# Patient Record
Sex: MALE | Race: OTHER | URBAN - METROPOLITAN AREA
[De-identification: names, ages, dates, MRNs, and addresses within clinical notes are randomized per-mention and may not be internally consistent; named-entity substitution may affect disease eponyms.]

---

## 2024-08-29 ENCOUNTER — EMERGENCY (EMERGENCY)
Facility: HOSPITAL | Age: 54
LOS: 1 days | Discharge: HOME | End: 2024-08-29
Attending: STUDENT IN AN ORGANIZED HEALTH CARE EDUCATION/TRAINING PROGRAM
Payer: COMMERCIAL

## 2024-08-29 VITALS
WEIGHT: 124.56 LBS | DIASTOLIC BLOOD PRESSURE: 84 MMHG | HEIGHT: 64.17 IN | RESPIRATION RATE: 20 BRPM | OXYGEN SATURATION: 97 % | TEMPERATURE: 99 F | SYSTOLIC BLOOD PRESSURE: 157 MMHG | HEART RATE: 78 BPM

## 2024-08-29 VITALS
RESPIRATION RATE: 18 BRPM | DIASTOLIC BLOOD PRESSURE: 76 MMHG | TEMPERATURE: 98 F | SYSTOLIC BLOOD PRESSURE: 142 MMHG | OXYGEN SATURATION: 99 % | HEART RATE: 72 BPM

## 2024-08-29 LAB
ALBUMIN SERPL ELPH-MCNC: 2.8 G/DL — LOW (ref 3.5–5)
ALP SERPL-CCNC: 141 U/L — HIGH (ref 40–120)
ALT FLD-CCNC: 20 U/L DA — SIGNIFICANT CHANGE UP (ref 10–60)
ANION GAP SERPL CALC-SCNC: 5 MMOL/L — SIGNIFICANT CHANGE UP (ref 5–17)
AST SERPL-CCNC: 24 U/L — SIGNIFICANT CHANGE UP (ref 10–40)
BASOPHILS # BLD AUTO: 0.03 K/UL — SIGNIFICANT CHANGE UP (ref 0–0.2)
BASOPHILS NFR BLD AUTO: 0.5 % — SIGNIFICANT CHANGE UP (ref 0–2)
BILIRUB SERPL-MCNC: 0.7 MG/DL — SIGNIFICANT CHANGE UP (ref 0.2–1.2)
BUN SERPL-MCNC: 7 MG/DL — SIGNIFICANT CHANGE UP (ref 7–18)
CALCIUM SERPL-MCNC: 9.1 MG/DL — SIGNIFICANT CHANGE UP (ref 8.4–10.5)
CHLORIDE SERPL-SCNC: 109 MMOL/L — HIGH (ref 96–108)
CO2 SERPL-SCNC: 27 MMOL/L — SIGNIFICANT CHANGE UP (ref 22–31)
CREAT SERPL-MCNC: 0.61 MG/DL — SIGNIFICANT CHANGE UP (ref 0.5–1.3)
EGFR: 115 ML/MIN/1.73M2 — SIGNIFICANT CHANGE UP
EGFR: 115 ML/MIN/1.73M2 — SIGNIFICANT CHANGE UP
EOSINOPHIL # BLD AUTO: 0.15 K/UL — SIGNIFICANT CHANGE UP (ref 0–0.5)
EOSINOPHIL NFR BLD AUTO: 2.5 % — SIGNIFICANT CHANGE UP (ref 0–6)
GLUCOSE SERPL-MCNC: 130 MG/DL — HIGH (ref 70–99)
HCT VFR BLD CALC: 45.4 % — SIGNIFICANT CHANGE UP (ref 39–50)
HGB BLD-MCNC: 15 G/DL — SIGNIFICANT CHANGE UP (ref 13–17)
IMM GRANULOCYTES NFR BLD AUTO: 0.2 % — SIGNIFICANT CHANGE UP (ref 0–0.9)
LIDOCAIN IGE QN: 41 U/L — SIGNIFICANT CHANGE UP (ref 13–75)
LYMPHOCYTES # BLD AUTO: 2.31 K/UL — SIGNIFICANT CHANGE UP (ref 1–3.3)
LYMPHOCYTES # BLD AUTO: 37.8 % — SIGNIFICANT CHANGE UP (ref 13–44)
MCHC RBC-ENTMCNC: 29.1 PG — SIGNIFICANT CHANGE UP (ref 27–34)
MCHC RBC-ENTMCNC: 33 GM/DL — SIGNIFICANT CHANGE UP (ref 32–36)
MCV RBC AUTO: 88.2 FL — SIGNIFICANT CHANGE UP (ref 80–100)
MONOCYTES # BLD AUTO: 1.14 K/UL — HIGH (ref 0–0.9)
MONOCYTES NFR BLD AUTO: 18.7 % — HIGH (ref 2–14)
NEUTROPHILS # BLD AUTO: 2.47 K/UL — SIGNIFICANT CHANGE UP (ref 1.8–7.4)
NEUTROPHILS NFR BLD AUTO: 40.3 % — LOW (ref 43–77)
NRBC # BLD: 0 /100 WBCS — SIGNIFICANT CHANGE UP (ref 0–0)
NRBC BLD-RTO: 0 /100 WBCS — SIGNIFICANT CHANGE UP (ref 0–0)
PLATELET # BLD AUTO: 73 K/UL — LOW (ref 150–400)
POTASSIUM SERPL-MCNC: 4.2 MMOL/L — SIGNIFICANT CHANGE UP (ref 3.5–5.3)
POTASSIUM SERPL-SCNC: 4.2 MMOL/L — SIGNIFICANT CHANGE UP (ref 3.5–5.3)
PROT SERPL-MCNC: 6 G/DL — SIGNIFICANT CHANGE UP (ref 6–8.3)
RBC # BLD: 5.15 M/UL — SIGNIFICANT CHANGE UP (ref 4.2–5.8)
RBC # FLD: 19.9 % — HIGH (ref 10.3–14.5)
SODIUM SERPL-SCNC: 141 MMOL/L — SIGNIFICANT CHANGE UP (ref 135–145)
WBC # BLD: 6.11 K/UL — SIGNIFICANT CHANGE UP (ref 3.8–10.5)
WBC # FLD AUTO: 6.11 K/UL — SIGNIFICANT CHANGE UP (ref 3.8–10.5)

## 2024-08-29 PROCEDURE — 99285 EMERGENCY DEPT VISIT HI MDM: CPT

## 2024-08-29 PROCEDURE — 83690 ASSAY OF LIPASE: CPT

## 2024-08-29 PROCEDURE — 74177 CT ABD & PELVIS W/CONTRAST: CPT | Mod: 26,MC

## 2024-08-29 PROCEDURE — 74177 CT ABD & PELVIS W/CONTRAST: CPT | Mod: MC

## 2024-08-29 PROCEDURE — 85025 COMPLETE CBC W/AUTO DIFF WBC: CPT

## 2024-08-29 PROCEDURE — 80053 COMPREHEN METABOLIC PANEL: CPT

## 2024-08-29 PROCEDURE — 99284 EMERGENCY DEPT VISIT MOD MDM: CPT | Mod: 25

## 2024-08-29 PROCEDURE — 36415 COLL VENOUS BLD VENIPUNCTURE: CPT

## 2024-08-29 RX ORDER — SULFAMETHOXAZOLE/TRIMETHOPRIM 800-160 MG
10 TABLET ORAL
Qty: 200 | Refills: 0
Start: 2024-08-29 | End: 2024-09-07

## 2024-08-29 RX ORDER — SULFAMETHOXAZOLE/TRIMETHOPRIM 800-160 MG
20 TABLET ORAL
Qty: 400 | Refills: 0
Start: 2024-08-29 | End: 2024-09-07

## 2024-08-29 RX ORDER — SULFAMETHOXAZOLE/TRIMETHOPRIM 800-160 MG
80 TABLET ORAL ONCE
Refills: 0 | Status: DISCONTINUED | OUTPATIENT
Start: 2024-08-29 | End: 2024-08-29

## 2024-08-29 RX ORDER — SULFAMETHOXAZOLE/TRIMETHOPRIM 800-160 MG
160 TABLET ORAL ONCE
Refills: 0 | Status: COMPLETED | OUTPATIENT
Start: 2024-08-29 | End: 2024-08-29

## 2024-08-29 RX ADMIN — Medication 160 MILLIGRAM(S): at 14:53

## 2024-09-03 ENCOUNTER — EMERGENCY (EMERGENCY)
Facility: HOSPITAL | Age: 54
LOS: 1 days | Discharge: HOME | End: 2024-09-03
Attending: STUDENT IN AN ORGANIZED HEALTH CARE EDUCATION/TRAINING PROGRAM
Payer: COMMERCIAL

## 2024-09-03 VITALS
OXYGEN SATURATION: 96 % | WEIGHT: 123.9 LBS | SYSTOLIC BLOOD PRESSURE: 143 MMHG | TEMPERATURE: 98 F | HEIGHT: 64.17 IN | DIASTOLIC BLOOD PRESSURE: 90 MMHG | HEART RATE: 100 BPM | RESPIRATION RATE: 17 BRPM

## 2024-09-03 VITALS
OXYGEN SATURATION: 96 % | RESPIRATION RATE: 18 BRPM | TEMPERATURE: 98 F | SYSTOLIC BLOOD PRESSURE: 149 MMHG | DIASTOLIC BLOOD PRESSURE: 81 MMHG | HEART RATE: 84 BPM

## 2024-09-03 LAB
ALBUMIN SERPL ELPH-MCNC: 3.1 G/DL — LOW (ref 3.5–5)
ALP SERPL-CCNC: 151 U/L — HIGH (ref 40–120)
ALT FLD-CCNC: 19 U/L DA — SIGNIFICANT CHANGE UP (ref 10–60)
ANION GAP SERPL CALC-SCNC: 5 MMOL/L — SIGNIFICANT CHANGE UP (ref 5–17)
APTT BLD: 33 SEC — SIGNIFICANT CHANGE UP (ref 24.5–35.6)
AST SERPL-CCNC: 18 U/L — SIGNIFICANT CHANGE UP (ref 10–40)
BASOPHILS # BLD AUTO: 0 K/UL — SIGNIFICANT CHANGE UP (ref 0–0.2)
BASOPHILS NFR BLD AUTO: 0 % — SIGNIFICANT CHANGE UP (ref 0–2)
BILIRUB SERPL-MCNC: 0.3 MG/DL — SIGNIFICANT CHANGE UP (ref 0.2–1.2)
BLD GP AB SCN SERPL QL: SIGNIFICANT CHANGE UP
BUN SERPL-MCNC: 11 MG/DL — SIGNIFICANT CHANGE UP (ref 7–18)
CALCIUM SERPL-MCNC: 9.5 MG/DL — SIGNIFICANT CHANGE UP (ref 8.4–10.5)
CHLORIDE SERPL-SCNC: 111 MMOL/L — HIGH (ref 96–108)
CO2 SERPL-SCNC: 27 MMOL/L — SIGNIFICANT CHANGE UP (ref 22–31)
CREAT SERPL-MCNC: 0.82 MG/DL — SIGNIFICANT CHANGE UP (ref 0.5–1.3)
EGFR: 105 ML/MIN/1.73M2 — SIGNIFICANT CHANGE UP
EOSINOPHIL # BLD AUTO: 0.15 K/UL — SIGNIFICANT CHANGE UP (ref 0–0.5)
EOSINOPHIL NFR BLD AUTO: 2 % — SIGNIFICANT CHANGE UP (ref 0–6)
GLUCOSE SERPL-MCNC: 136 MG/DL — HIGH (ref 70–99)
HCT VFR BLD CALC: 49 % — SIGNIFICANT CHANGE UP (ref 39–50)
HGB BLD-MCNC: 15.9 G/DL — SIGNIFICANT CHANGE UP (ref 13–17)
INR BLD: 1.06 RATIO — SIGNIFICANT CHANGE UP (ref 0.85–1.18)
LACTATE SERPL-SCNC: 1.1 MMOL/L — SIGNIFICANT CHANGE UP (ref 0.7–2)
LACTATE SERPL-SCNC: 2.9 MMOL/L — HIGH (ref 0.7–2)
LIDOCAIN IGE QN: 52 U/L — SIGNIFICANT CHANGE UP (ref 13–75)
LYMPHOCYTES # BLD AUTO: 2.91 K/UL — SIGNIFICANT CHANGE UP (ref 1–3.3)
LYMPHOCYTES # BLD AUTO: 40 % — SIGNIFICANT CHANGE UP (ref 13–44)
MANUAL SMEAR VERIFICATION: SIGNIFICANT CHANGE UP
MCHC RBC-ENTMCNC: 29.1 PG — SIGNIFICANT CHANGE UP (ref 27–34)
MCHC RBC-ENTMCNC: 32.4 GM/DL — SIGNIFICANT CHANGE UP (ref 32–36)
MCV RBC AUTO: 89.6 FL — SIGNIFICANT CHANGE UP (ref 80–100)
MONOCYTES # BLD AUTO: 1.45 K/UL — HIGH (ref 0–0.9)
MONOCYTES NFR BLD AUTO: 20 % — HIGH (ref 2–14)
NEUTROPHILS # BLD AUTO: 1.89 K/UL — SIGNIFICANT CHANGE UP (ref 1.8–7.4)
NEUTROPHILS NFR BLD AUTO: 26 % — LOW (ref 43–77)
NRBC # BLD: 0 /100 WBCS — SIGNIFICANT CHANGE UP (ref 0–0)
PLAT MORPH BLD: NORMAL — SIGNIFICANT CHANGE UP
PLATELET # BLD AUTO: 98 K/UL — LOW (ref 150–400)
PLATELET COUNT - ESTIMATE: ABNORMAL
POTASSIUM SERPL-MCNC: 5 MMOL/L — SIGNIFICANT CHANGE UP (ref 3.5–5.3)
POTASSIUM SERPL-SCNC: 5 MMOL/L — SIGNIFICANT CHANGE UP (ref 3.5–5.3)
PROT SERPL-MCNC: 6.7 G/DL — SIGNIFICANT CHANGE UP (ref 6–8.3)
PROTHROM AB SERPL-ACNC: 12.1 SEC — SIGNIFICANT CHANGE UP (ref 9.5–13)
RBC # BLD: 5.47 M/UL — SIGNIFICANT CHANGE UP (ref 4.2–5.8)
RBC # FLD: 18.6 % — HIGH (ref 10.3–14.5)
RBC BLD AUTO: NORMAL — SIGNIFICANT CHANGE UP
SODIUM SERPL-SCNC: 143 MMOL/L — SIGNIFICANT CHANGE UP (ref 135–145)
VARIANT LYMPHS # BLD: 12 % — HIGH (ref 0–6)
WBC # BLD: 7.27 K/UL — SIGNIFICANT CHANGE UP (ref 3.8–10.5)
WBC # FLD AUTO: 7.27 K/UL — SIGNIFICANT CHANGE UP (ref 3.8–10.5)

## 2024-09-03 PROCEDURE — 85730 THROMBOPLASTIN TIME PARTIAL: CPT

## 2024-09-03 PROCEDURE — 86901 BLOOD TYPING SEROLOGIC RH(D): CPT

## 2024-09-03 PROCEDURE — 83605 ASSAY OF LACTIC ACID: CPT

## 2024-09-03 PROCEDURE — 86850 RBC ANTIBODY SCREEN: CPT

## 2024-09-03 PROCEDURE — 83690 ASSAY OF LIPASE: CPT

## 2024-09-03 PROCEDURE — 99285 EMERGENCY DEPT VISIT HI MDM: CPT

## 2024-09-03 PROCEDURE — 36415 COLL VENOUS BLD VENIPUNCTURE: CPT

## 2024-09-03 PROCEDURE — 85025 COMPLETE CBC W/AUTO DIFF WBC: CPT

## 2024-09-03 PROCEDURE — 49465 FLUORO EXAM OF G/COLON TUBE: CPT

## 2024-09-03 PROCEDURE — 99284 EMERGENCY DEPT VISIT MOD MDM: CPT | Mod: 25

## 2024-09-03 PROCEDURE — 80053 COMPREHEN METABOLIC PANEL: CPT

## 2024-09-03 PROCEDURE — 85610 PROTHROMBIN TIME: CPT

## 2024-09-03 PROCEDURE — 86900 BLOOD TYPING SEROLOGIC ABO: CPT

## 2024-09-03 RX ORDER — DIATRIZOATE MEGLUMINE, SODIUM 66 %-10 %
50 VIAL (ML) INJECTION ONCE
Refills: 0 | Status: ACTIVE | OUTPATIENT
Start: 2024-09-03

## 2024-09-03 RX ORDER — SODIUM CHLORIDE 9 MG/ML
2000 INJECTION INTRAMUSCULAR; INTRAVENOUS; SUBCUTANEOUS ONCE
Refills: 0 | Status: COMPLETED | OUTPATIENT
Start: 2024-09-03 | End: 2024-09-03

## 2024-09-03 RX ADMIN — SODIUM CHLORIDE 2000 MILLILITER(S): 9 INJECTION INTRAMUSCULAR; INTRAVENOUS; SUBCUTANEOUS at 19:46

## 2024-09-03 NOTE — ED PROVIDER NOTE - PROGRESS NOTE DETAILS
JK - Labs within normal limits, lactic acidosis improving with IV fluids.  J-tube replaced by surgery.  Vital signs stable resting comfortably belly soft no peritoneal signs.  Requesting go home.  Patient states he has oncology appointment tomorrow.  Ready for DC.

## 2024-09-03 NOTE — ED PROVIDER NOTE - CLINICAL SUMMARY MEDICAL DECISION MAKING FREE TEXT BOX
Patient is a 54-year-old male with past medical history of metastatic gastric cancer s/p aborted distal gastrectomy,  jejunostomy tube placement on 4/19/2024 presenting after J-tube fell out.  VSS belly soft no peritoneal signs.  -   Check labs, rule out electrolyte abnormalities, lactate to evaluate for end-organ dysfunction, consult surgery and likely IR for jejunostomy tube replacement, reassess.

## 2024-09-03 NOTE — CONSULT NOTE ADULT - SUBJECTIVE AND OBJECTIVE BOX
Surgery Consult Note     54 M with metastatic gastric cancer s/p  aborted distal gastrectomy on 4/19 due to new found metastasis and jejunostomy tube placement. Patient seen in the ED, and surg-onc consulted for dislodged jejunostomy tube . Denies any complaints and tolerating Oral feeds well with normal BM this morning and continuing to pass flatus. Upon examination, his prior placed stitch has been dislodged, and the balloon is seen outside of the abdominal cavity and not fully inflated. Patient endorses having put as much of the J tube back in after he noticed that the balloon had come out. Patient reassured and plan for J tube study explained.      Physical Exam   Gen: AAOx4, NAD, mentating normally  CV: RRR, normal S1/S2  Pulm: Breathing comfortably on RA. Equal chest rise b/l. Lung fields CTAB  Abd: Soft, non-tender, non-distended. No rebound or guarding. No peritoneal signs, J tube bumper without suture to keep it in place and the balloon for J tube is outside of the abdominal cavity. There is no drainage from the tube insertion site and there is some reactive erythema but no overt signs of infection.  Back/flank: No CVA tenderness  Extremities: WWP. Moving all 4 extremities spontaneously. Strength 5/5. Sensation intact  Skin: No rashes or suspicious lesions     Vital Signs Last 24 Hrs  T(C): 36.8 (03 Sep 2024 15:39), Max: 36.8 (03 Sep 2024 15:39)  T(F): 98.2 (03 Sep 2024 15:39), Max: 98.2 (03 Sep 2024 15:39)  HR: 100 (03 Sep 2024 15:39) (100 - 100)  BP: 143/90 (03 Sep 2024 15:39) (143/90 - 143/90)  BP(mean): --  RR: 17 (03 Sep 2024 15:39) (17 - 17)  SpO2: 96% (03 Sep 2024 15:39) (96% - 96%)    Parameters below as of 03 Sep 2024 15:39  Patient On (Oxygen Delivery Method): room air                          15.9   7.27  )-----------( 98       ( 03 Sep 2024 17:40 )             49.0     09-03    143  |  111<H>  |  11  ----------------------------<  136<H>  5.0   |  27  |  0.82    Ca    9.5      03 Sep 2024 17:40    TPro  6.7  /  Alb  3.1<L>  /  TBili  0.3  /  DBili  x   /  AST  18  /  ALT  19  /  AlkPhos  151<H>  09-03, Lactate 2.9    Water soluble contrast study was performed with supine X ray showing good positioning of the tube, followed by injection of 50cc of water soluble contrast into the J tube with subsequent X ray at 0min, 2 min, 5 min showing good contrast entry into the jejunum without leaking into the abdominal cavity.    Patient's J tube balloon was re-inflated. this time ensuring 10ccs were instilled into the balloon (sterile water). Followed by placement of a suture with 3-0 Nylon at the J tube bumper for security. Simple dressing was applied using gauze and tape.

## 2024-09-03 NOTE — ED PROVIDER NOTE - PHYSICAL EXAMINATION
Gen: no acute distress  Head: normocephalic, atraumatic  Lung: CTAB, no respiratory distress, no wheezing, rales, rhonchi  CV: normal s1/s2, rrr,   Abd: soft, non-tender, non-distended. J-tube insertion site with mild erythema no fluctuance no active bleeding no peritoneal signs   MSK: No edema, no visible deformities, full range of motion in all 4 extremities  Neuro: No focal neurologic deficits

## 2024-09-03 NOTE — ED PROVIDER NOTE - OBJECTIVE STATEMENT
Patient is a 54-year-old male with past medical history of metastatic gastric cancer s/p aborted distal gastrectomy,  jejunostomy tube placement on 4/19/2024 presenting after J-tube fell out.  Patient states  his J-tube got dislodged today.  Patient was recently seen in the emergency department for cellulitis surrounding his J-tube insertion site and discharged on Bactrim denies any fevers, purulent drainage.  Denies any severe abdominal pain.

## 2024-09-03 NOTE — CONSULT NOTE ADULT - ASSESSMENT
A/P:  54 M  with metastatic gastric cancer s/p jejunostomy tube placement on 4/19 here today for concern of dislodged tube   - J tube study performed as above. J tube study performed and shows correct positioning and no leaking of contrast into the abdominal cavity.  - No surgical intervention at this time   - Lactate seen to be elevated. Please hydrate and repeat.  - Patient to continue with his regular clinic follow up with Surg Onc and IR.    Pt discussed with Surgical Attending

## 2024-09-03 NOTE — ED PROVIDER NOTE - NSFOLLOWUPINSTRUCTIONS_ED_ALL_ED_FT
Yeyunostomía endoscópica percutánea, cuidados posteriores  Percutaneous Endoscopic Jejunostomy, Care After  Después de staci yeyunostomía endoscópica percutánea, es normal tener dolor y sensibilidad en la alonzo que rodea la sonda de alimentación. También es normal tener lo siguiente:  Sensación de distensión juan carlos algunas horas.  Dolor de garganta.  Pérdida de staci pequeña cantidad de líquido un poco de ed alrededor de la sonda de alimentación.  Siga estas instrucciones en verduzco casa:  Actividad    Retome jose actividades normales jason se lo haya indicado el médico. Pregúntele al médico qué actividades son seguras para usted.  Zbigniew reposo jason se lo haya indicado el médico.  Evite estar sentado juan carlos largos períodos sin moverse. Levántese y camine un poco cada 1 a 2 horas. [place] es importante para mejorar el flujo sanguíneo y la respiración. Pida ayuda si se siente débil o no se siente estable.  Si le administraron un sedante juan carlos el procedimiento, puede afectarlo por varias horas. No conduzca ni opere maquinaria hasta que el médico le indique que es seguro hacerlo.  Cuidado de la incisión    A person using a wipe on the skin of a patient's belly where a feeding tube has been inserted.  A person placing a bandage on a patient's belly where a feeding tube has been inserted.  No tome jamal, no nade ni use un jacuzzi hasta que el médico lo apruebe. Pregúntele al médico si puede ducharse. Hugh vez solo le permitan darse jamal de esponja.  Siga las instrucciones del médico acerca de cómo cuidar el lugar de la sonda de alimentación. Asegúrese de hacer lo siguiente:  Lávese las rodriguez con agua y jabón juan carlos al menos 20 segundos antes y después de cambiarse la venda o el vendaje. Use desinfectante para rodriguez si no dispone de agua y jabón.  Limpie la piel alrededor de la sonda de alimentación todos los días con agua jabonosa.  Aplíquese un ungüento en la alonzo solo jason se lo haya indicado el médico.  Cambie el vendaje jason se lo haya indicado el médico.  Controle la alonzo alrededor de la sonda de alimentación todos los días para detectar signos de infección. Esté atento a los siguientes signos:  Enrojecimiento, hinchazón o más dolor.  Más líquido o ed.  Calor.  Pus o mal olor.  Instrucciones generales    Use los medicamentos de venta greg y los recetados solo jason se lo haya indicado el médico.  Asegúrese de entender cuándo y cómo utilizar la sonda de alimentación. Comuníquese con verduzco médico si tiene preguntas sobre esto.  Concurra a todas las visitas de seguimiento. El médico controlará el proceso de cicatrización y quizá ajuste el plan de alimentación.  El médico podrá darle instrucciones más específicas. Asegúrese de saber lo que puede y lo que no puede hacer.    Comuníquese con un médico si:  Tiene un dolor que empeora o que no mejora con los medicamentos.  Tiene estreñimiento o diarrea que no mejoran con el tratamiento.  Tiene náuseas o distensión abdominal que no mejoran con el tratamiento.  Tiene signos de infección.  Tiene fiebre.  La sonda de alimentación está obstruida y no puede purgarse.  Solicite ayuda de inmediato si:  Tiene tos y dificultad para respirar.  Siente un dolor muy intenso en el vientre.  Vomita ed.  Tiene fiebre que dura más de rachel días.  La sonda de alimentación se le sale.  Estos síntomas pueden indicar staci emergencia. Solicite ayuda de inmediato. Llame al 911.  No espere a fabian si los síntomas desaparecen.  No conduzca por jose propios medios hasta el hospital.  Esta información no tiene jason fin reemplazar el consejo del médico. Asegúrese de hacerle al médico cualquier pregunta que tenga.    Document Revised: 01/27/2024 Document Reviewed: 01/27/2024

## 2024-09-03 NOTE — ED PROVIDER NOTE - PATIENT PORTAL LINK FT
You can access the FollowMyHealth Patient Portal offered by [facility] by registering at the following website: [email or web address]. By joining [facility]’s FollowMyHealth portal, you will also be able to view your health information using other applications (apps) compatible with our system.

## 2024-09-09 ENCOUNTER — OUTPATIENT (OUTPATIENT)
Dept: OUTPATIENT SERVICES | Facility: HOSPITAL | Age: 54
LOS: 1 days | End: 2024-09-09
Payer: COMMERCIAL

## 2024-09-09 PROCEDURE — L8699: CPT

## 2024-09-09 PROCEDURE — C1887: CPT

## 2024-09-09 PROCEDURE — C1769: CPT

## 2024-09-09 PROCEDURE — 76000 FLUOROSCOPY <1 HR PHYS/QHP: CPT

## 2024-09-09 PROCEDURE — 49451 REPLACE DUOD/JEJ TUBE PERC: CPT

## 2024-09-16 ENCOUNTER — OUTPATIENT (OUTPATIENT)
Dept: OUTPATIENT SERVICES | Facility: HOSPITAL | Age: 54
LOS: 1 days | End: 2024-09-16
Payer: COMMERCIAL

## 2024-09-16 VITALS
RESPIRATION RATE: 16 BRPM | SYSTOLIC BLOOD PRESSURE: 156 MMHG | HEIGHT: 63 IN | TEMPERATURE: 98 F | DIASTOLIC BLOOD PRESSURE: 79 MMHG | WEIGHT: 121.92 LBS | OXYGEN SATURATION: 98 % | HEART RATE: 68 BPM

## 2024-09-16 DIAGNOSIS — Z01.818 ENCOUNTER FOR OTHER PREPROCEDURAL EXAMINATION: ICD-10-CM

## 2024-09-16 DIAGNOSIS — C16.2 MALIGNANT NEOPLASM OF BODY OF STOMACH: ICD-10-CM

## 2024-09-16 DIAGNOSIS — I10 ESSENTIAL (PRIMARY) HYPERTENSION: ICD-10-CM

## 2024-09-16 LAB — BLD GP AB SCN SERPL QL: SIGNIFICANT CHANGE UP

## 2024-09-16 NOTE — H&P PST ADULT - PROBLEM SELECTOR PLAN 1
Pt schedule for Diagnostic Laparoscopy on 9/20/24 with Dr David    Labs drawn by PCP - will f/u result  Pt was seen by PCP for Medical clearance - will f/u report    Pt was  instructed to stop aspirin/ecotrin and all over the counter medication including vitamins and herbal supplements one week prior to surgery   Instructions given on the use of 4% chlorhexidine wash and Pt verbalized understanding of same   Pt Instructed to have nothing by mouth starting midnight day before surgery  Patient is to expect a phone call day before surgery between the hours of 430- 630pm giving arrival time for surgery   Written and verbal preoperative instructions given to patient with understanding verbalized via  Ruchi #[id number]     Patient today with STOP bang score 3  Low  risk for SHARON

## 2024-09-16 NOTE — H&P PST ADULT - HISTORY OF PRESENT ILLNESS
54 y.o male former smoker, with PMHx for HLD, HTN, c/o of nausea /vomiting on w/u found Malignant Neoplasm of Body of Stomach,  aborted Gastrectomy 4/2024, due to newly found metastasis, s/p Jejunostomy tube 4/2024, currently on chemo via right chest port and now for schedule Diagnostic Laparoscopy on 9/20/24 with Dr David

## 2024-09-16 NOTE — H&P PST ADULT - NSICDXPASTMEDICALHX_GEN_ALL_CORE_FT
PAST MEDICAL HISTORY:  Cigarette smoker     History of prediabetes     HTN (hypertension)     Hyperlipidemia     Malignant neoplasm of body of stomach

## 2024-09-16 NOTE — H&P PST ADULT - ASSESSMENT
54 y.o male with Malignant Neoplasm of Body of Stomach and now for schedule Diagnostic Laparoscopy on 9/20/24 with Dr Frankie DENIS 3

## 2024-09-16 NOTE — H&P PST ADULT - REASON FOR ADMISSION
"Ear Cerumen Removal    Date/Time: 2/19/2024 2:30 PM    Performed by: Melanie Castelan APRN  Authorized by: Melanie Castelan APRN    Time out: Immediately prior to procedure a "time out" was called to verify the correct patient, procedure, equipment, support staff and site/side marked as required.    Consent Done?:  Yes (Verbal)    Local anesthetic:  None  Medication Used:  Debrox  Location details:  Right ear  Procedure type: curette and irrigation    Cerumen  Removal Results:  Cerumen completely removed  Patient tolerance:  Patient tolerated the procedure well with no immediate complications    " Diagnostic Laparoscopy on 9/20/24 with Dr David

## 2024-09-17 PROCEDURE — 36415 COLL VENOUS BLD VENIPUNCTURE: CPT

## 2024-09-17 PROCEDURE — 86850 RBC ANTIBODY SCREEN: CPT

## 2024-09-17 PROCEDURE — G0463: CPT

## 2024-09-17 PROCEDURE — 86900 BLOOD TYPING SEROLOGIC ABO: CPT

## 2024-09-17 PROCEDURE — 86901 BLOOD TYPING SEROLOGIC RH(D): CPT

## 2024-09-20 ENCOUNTER — OUTPATIENT (OUTPATIENT)
Dept: OUTPATIENT SERVICES | Facility: HOSPITAL | Age: 54
LOS: 1 days | End: 2024-09-20
Payer: COMMERCIAL

## 2024-09-20 VITALS
OXYGEN SATURATION: 95 % | HEART RATE: 63 BPM | SYSTOLIC BLOOD PRESSURE: 135 MMHG | RESPIRATION RATE: 18 BRPM | DIASTOLIC BLOOD PRESSURE: 80 MMHG

## 2024-09-20 VITALS
RESPIRATION RATE: 16 BRPM | HEART RATE: 74 BPM | OXYGEN SATURATION: 99 % | TEMPERATURE: 98 F | WEIGHT: 121.92 LBS | DIASTOLIC BLOOD PRESSURE: 79 MMHG | HEIGHT: 63 IN | SYSTOLIC BLOOD PRESSURE: 146 MMHG

## 2024-09-20 DIAGNOSIS — Z01.818 ENCOUNTER FOR OTHER PREPROCEDURAL EXAMINATION: ICD-10-CM

## 2024-09-20 DIAGNOSIS — C16.2 MALIGNANT NEOPLASM OF BODY OF STOMACH: ICD-10-CM

## 2024-09-20 LAB — BLD GP AB SCN SERPL QL: SIGNIFICANT CHANGE UP

## 2024-09-20 PROCEDURE — 86901 BLOOD TYPING SEROLOGIC RH(D): CPT

## 2024-09-20 PROCEDURE — 86850 RBC ANTIBODY SCREEN: CPT

## 2024-09-20 PROCEDURE — 88312 SPECIAL STAINS GROUP 1: CPT

## 2024-09-20 PROCEDURE — 36415 COLL VENOUS BLD VENIPUNCTURE: CPT

## 2024-09-20 PROCEDURE — 88112 CYTOPATH CELL ENHANCE TECH: CPT | Mod: 26

## 2024-09-20 PROCEDURE — 88305 TISSUE EXAM BY PATHOLOGIST: CPT

## 2024-09-20 PROCEDURE — 88305 TISSUE EXAM BY PATHOLOGIST: CPT | Mod: 26

## 2024-09-20 PROCEDURE — 49203: CPT

## 2024-09-20 PROCEDURE — 88112 CYTOPATH CELL ENHANCE TECH: CPT

## 2024-09-20 PROCEDURE — 49321 LAPAROSCOPY BIOPSY: CPT

## 2024-09-20 PROCEDURE — 49320 DIAG LAPARO SEPARATE PROC: CPT

## 2024-09-20 PROCEDURE — 86900 BLOOD TYPING SEROLOGIC ABO: CPT

## 2024-09-20 PROCEDURE — 88312 SPECIAL STAINS GROUP 1: CPT | Mod: 26

## 2024-09-20 RX ORDER — ACETAMINOPHEN 325 MG/1
650 TABLET ORAL EVERY 6 HOURS
Refills: 0 | Status: ACTIVE | OUTPATIENT
Start: 2024-09-20 | End: 2025-08-19

## 2024-09-20 RX ORDER — IBUPROFEN 600 MG
400 TABLET ORAL EVERY 6 HOURS
Refills: 0 | Status: ACTIVE | OUTPATIENT
Start: 2024-09-20 | End: 2025-08-19

## 2024-09-20 RX ORDER — OXYCODONE HYDROCHLORIDE 5 MG/1
1 TABLET ORAL
Qty: 8 | Refills: 0
Start: 2024-09-20 | End: 2024-09-21

## 2024-09-20 RX ORDER — SODIUM CHLORIDE 9 MG/ML
3 INJECTION INTRAMUSCULAR; INTRAVENOUS; SUBCUTANEOUS EVERY 8 HOURS
Refills: 0 | Status: DISCONTINUED | OUTPATIENT
Start: 2024-09-20 | End: 2024-09-20

## 2024-09-20 RX ORDER — FENTANYL CITRATE 50 UG/ML
25 INJECTION INTRAMUSCULAR; INTRAVENOUS
Refills: 0 | Status: DISCONTINUED | OUTPATIENT
Start: 2024-09-20 | End: 2024-09-20

## 2024-09-20 RX ORDER — FENTANYL CITRATE 50 UG/ML
50 INJECTION INTRAMUSCULAR; INTRAVENOUS
Refills: 0 | Status: DISCONTINUED | OUTPATIENT
Start: 2024-09-20 | End: 2024-09-20

## 2024-09-20 RX ADMIN — FENTANYL CITRATE 25 MICROGRAM(S): 50 INJECTION INTRAMUSCULAR; INTRAVENOUS at 09:53

## 2024-09-20 NOTE — ASU DISCHARGE PLAN (ADULT/PEDIATRIC) - ASU DC SPECIAL INSTRUCTIONSFT
You have had diagnostic laparoscopy.     No strenuous activity or heavy lifting >15 pounds until cleared by your surgeon.  There are 3 small incisions in the abdomen. These have stitches that will dissolve on their own and sterile strips that will come off in 1-2 weeks with normal showering. You may shower, but avoid scrubbing, bathing, swimming in pools, and hot tubs.    For pain, alternate tylenol 650 mg and motrin 400 mg every 3 hours for baseline pain control. This means that if you take tylenol at 12 pm, take motrin at 3 pm, and then tylenol again at 6 pm, etc.   Do not take more than 4,000 mg of tylenol in 24 hours. Do not take more than 3,200 mg of motrin in 24 hours. Do not take motrin consistently for > 7 days as this medication can increase your risk of stomach ulceration and GI bleeding when taken for prolonged periods of time.   For pain not controlled by these medications, take the prescription sent to your pharmacy     Please continue to take all medications as previously prescribed to you.    Please return to clinic to see Dr. David within 1-2 weeks of discharge - call the number provided to make an appointment.

## 2024-09-20 NOTE — ASU DISCHARGE PLAN (ADULT/PEDIATRIC) - NS MD DC FALL RISK RISK
For information on Fall & Injury Prevention, visit: [email or web address] OR  [email or web address] OR  https://www.cdc.gov/steadi/patient.html

## 2024-09-20 NOTE — BRIEF OPERATIVE NOTE - OPERATION/FINDINGS
Mesentery and peritoneum clear  Observed extragastric mucinous mass in lesser sac with pancreas involvement

## 2024-09-20 NOTE — ASU DISCHARGE PLAN (ADULT/PEDIATRIC) - CARE PROVIDER_API CALL
Frankie Sorto, [facility]  Surgery  [address], [place] [address]  Phone: [phone]  Fax: [phone]  Follow Up Time:

## 2024-09-20 NOTE — ASU PATIENT PROFILE, ADULT - FALL HARM RISK - FALL HARM RISK
[Time Spent: ___ minutes] : I have spent [unfilled] minutes of time on the encounter. [>50% of the face to face encounter time was spent on counseling and/or coordination of care for ___] : Greater than 50% of the face to face encounter time was spent on counseling and/or coordination of care for [unfilled] No indicators present

## 2024-09-23 PROBLEM — C16.2 MALIGNANT NEOPLASM OF BODY OF STOMACH: Chronic | Status: ACTIVE | Noted: 2024-09-16

## 2024-12-20 ENCOUNTER — EMERGENCY (EMERGENCY)
Facility: HOSPITAL | Age: 54
LOS: 1 days | Discharge: HOME | End: 2024-12-20
Attending: STUDENT IN AN ORGANIZED HEALTH CARE EDUCATION/TRAINING PROGRAM
Payer: COMMERCIAL

## 2024-12-20 VITALS
HEART RATE: 77 BPM | TEMPERATURE: 98 F | RESPIRATION RATE: 18 BRPM | OXYGEN SATURATION: 95 % | SYSTOLIC BLOOD PRESSURE: 103 MMHG | DIASTOLIC BLOOD PRESSURE: 53 MMHG

## 2024-12-20 VITALS
TEMPERATURE: 98 F | WEIGHT: 126.99 LBS | OXYGEN SATURATION: 98 % | HEIGHT: 63.39 IN | RESPIRATION RATE: 16 BRPM | DIASTOLIC BLOOD PRESSURE: 63 MMHG | HEART RATE: 100 BPM | SYSTOLIC BLOOD PRESSURE: 106 MMHG

## 2024-12-20 LAB
ALBUMIN SERPL ELPH-MCNC: 2.3 G/DL — LOW (ref 3.5–5)
ALP SERPL-CCNC: 144 U/L — HIGH (ref 40–120)
ALT FLD-CCNC: 22 U/L DA — SIGNIFICANT CHANGE UP (ref 10–60)
ANION GAP SERPL CALC-SCNC: 7 MMOL/L — SIGNIFICANT CHANGE UP (ref 5–17)
ANISOCYTOSIS BLD QL: SLIGHT — SIGNIFICANT CHANGE UP
APTT BLD: 34.4 SEC — SIGNIFICANT CHANGE UP (ref 24.5–35.6)
AST SERPL-CCNC: 44 U/L — HIGH (ref 10–40)
BASOPHILS # BLD AUTO: 0 K/UL — SIGNIFICANT CHANGE UP (ref 0–0.2)
BASOPHILS NFR BLD AUTO: 0 % — SIGNIFICANT CHANGE UP (ref 0–2)
BILIRUB SERPL-MCNC: 0.7 MG/DL — SIGNIFICANT CHANGE UP (ref 0.2–1.2)
BUN SERPL-MCNC: 12 MG/DL — SIGNIFICANT CHANGE UP (ref 7–18)
CALCIUM SERPL-MCNC: 8.1 MG/DL — LOW (ref 8.4–10.5)
CHLORIDE SERPL-SCNC: 109 MMOL/L — HIGH (ref 96–108)
CO2 SERPL-SCNC: 26 MMOL/L — SIGNIFICANT CHANGE UP (ref 22–31)
CREAT SERPL-MCNC: 0.82 MG/DL — SIGNIFICANT CHANGE UP (ref 0.5–1.3)
EGFR: 105 ML/MIN/1.73M2 — SIGNIFICANT CHANGE UP
EOSINOPHIL # BLD AUTO: 0 K/UL — SIGNIFICANT CHANGE UP (ref 0–0.5)
EOSINOPHIL NFR BLD AUTO: 0 % — SIGNIFICANT CHANGE UP (ref 0–6)
GLUCOSE SERPL-MCNC: 120 MG/DL — HIGH (ref 70–99)
HCT VFR BLD CALC: 34.5 % — LOW (ref 39–50)
HGB BLD-MCNC: 11.4 G/DL — LOW (ref 13–17)
INR BLD: 1.29 RATIO — HIGH (ref 0.85–1.16)
LG PLATELETS BLD QL AUTO: SLIGHT — SIGNIFICANT CHANGE UP
LYMPHOCYTES # BLD AUTO: 15 % — SIGNIFICANT CHANGE UP (ref 13–44)
LYMPHOCYTES # BLD AUTO: 3.64 K/UL — HIGH (ref 1–3.3)
MACROCYTES BLD QL: SLIGHT — SIGNIFICANT CHANGE UP
MAGNESIUM SERPL-MCNC: 1.6 MG/DL — SIGNIFICANT CHANGE UP (ref 1.6–2.6)
MANUAL SMEAR VERIFICATION: SIGNIFICANT CHANGE UP
MCHC RBC-ENTMCNC: 30.7 PG — SIGNIFICANT CHANGE UP (ref 27–34)
MCHC RBC-ENTMCNC: 33 G/DL — SIGNIFICANT CHANGE UP (ref 32–36)
MCV RBC AUTO: 93 FL — SIGNIFICANT CHANGE UP (ref 80–100)
METAMYELOCYTES # FLD: 2 % — HIGH (ref 0–0)
MONOCYTES # BLD AUTO: 2.91 K/UL — HIGH (ref 0–0.9)
MONOCYTES NFR BLD AUTO: 12 % — SIGNIFICANT CHANGE UP (ref 2–14)
MYELOCYTES NFR BLD: 3 % — HIGH (ref 0–0)
NEUTROPHILS # BLD AUTO: 15.3 K/UL — HIGH (ref 1.8–7.4)
NEUTROPHILS NFR BLD AUTO: 54 % — SIGNIFICANT CHANGE UP (ref 43–77)
NEUTS BAND # BLD: 9 % — HIGH (ref 0–8)
NRBC # BLD: 1 /100 WBCS — HIGH (ref 0–0)
NT-PROBNP SERPL-SCNC: 489 PG/ML — HIGH (ref 0–125)
PLAT MORPH BLD: NORMAL — SIGNIFICANT CHANGE UP
PLATELET # BLD AUTO: 68 K/UL — LOW (ref 150–400)
PLATELET COUNT - ESTIMATE: ABNORMAL
POIKILOCYTOSIS BLD QL AUTO: SLIGHT — SIGNIFICANT CHANGE UP
POTASSIUM SERPL-MCNC: 3.5 MMOL/L — SIGNIFICANT CHANGE UP (ref 3.5–5.3)
POTASSIUM SERPL-SCNC: 3.5 MMOL/L — SIGNIFICANT CHANGE UP (ref 3.5–5.3)
PROT SERPL-MCNC: 4.7 G/DL — LOW (ref 6–8.3)
PROTHROM AB SERPL-ACNC: 15.1 SEC — HIGH (ref 9.9–13.4)
RBC # BLD: 3.71 M/UL — LOW (ref 4.2–5.8)
RBC # FLD: 17.5 % — HIGH (ref 10.3–14.5)
RBC BLD AUTO: ABNORMAL
SODIUM SERPL-SCNC: 142 MMOL/L — SIGNIFICANT CHANGE UP (ref 135–145)
VARIANT LYMPHS # BLD: 5 % — SIGNIFICANT CHANGE UP (ref 0–6)
WBC # BLD: 24.28 K/UL — HIGH (ref 3.8–10.5)
WBC # FLD AUTO: 24.28 K/UL — HIGH (ref 3.8–10.5)

## 2024-12-20 PROCEDURE — 83880 ASSAY OF NATRIURETIC PEPTIDE: CPT

## 2024-12-20 PROCEDURE — 99285 EMERGENCY DEPT VISIT HI MDM: CPT | Mod: 25

## 2024-12-20 PROCEDURE — 36415 COLL VENOUS BLD VENIPUNCTURE: CPT

## 2024-12-20 PROCEDURE — 93005 ELECTROCARDIOGRAM TRACING: CPT

## 2024-12-20 PROCEDURE — 71046 X-RAY EXAM CHEST 2 VIEWS: CPT

## 2024-12-20 PROCEDURE — 85025 COMPLETE CBC W/AUTO DIFF WBC: CPT

## 2024-12-20 PROCEDURE — 80053 COMPREHEN METABOLIC PANEL: CPT

## 2024-12-20 PROCEDURE — 93970 EXTREMITY STUDY: CPT | Mod: 26

## 2024-12-20 PROCEDURE — 85730 THROMBOPLASTIN TIME PARTIAL: CPT

## 2024-12-20 PROCEDURE — 71046 X-RAY EXAM CHEST 2 VIEWS: CPT | Mod: 26

## 2024-12-20 PROCEDURE — 99285 EMERGENCY DEPT VISIT HI MDM: CPT

## 2024-12-20 PROCEDURE — 93970 EXTREMITY STUDY: CPT

## 2024-12-20 PROCEDURE — 83735 ASSAY OF MAGNESIUM: CPT

## 2024-12-20 PROCEDURE — 85610 PROTHROMBIN TIME: CPT

## 2024-12-20 RX ORDER — AMOXICILLIN/POTASSIUM CLAV 250-125 MG
875 TABLET ORAL
Qty: 14 | Refills: 0
Start: 2024-12-20 | End: 2024-12-26

## 2024-12-20 RX ORDER — AZITHROMYCIN 250 MG/1
1 TABLET, FILM COATED ORAL
Qty: 6 | Refills: 0
Start: 2024-12-20 | End: 2024-12-24

## 2024-12-20 NOTE — ED PROVIDER NOTE - CARE PROVIDER_API CALL
Davie Palma.  Medical Oncology  [address], [address]  [place] [address]  Phone: [phone]  Fax: [phone]  Follow Up Time:

## 2024-12-20 NOTE — ED PROVIDER NOTE - NSFOLLOWUPINSTRUCTIONS_ED_ALL_ED_FT
You were seen in the [facility] Emergency Department today for Leg swelling.  Please purchase compression stockings at the pharmacy and wear them regularly.  Please keep your legs elevated at home. Your ultrasound today did not show any blood clots in the legs.    All of the lab and imaging results available upon your time of discharge are included in this discharge paperwork. Please take all of your prescribed or over-the-counter medications as prescribed or as directed.    Please follow up with your primary care physician within one week or as needed for continued management and any further evaluation. Or, if you do not have a primary care physician, you may call patient access services at [phone] find a primary care physician, or call [phone] to make an appointment with the clinic.    Please return to the emergency department for any worsening symptoms or concerns.

## 2024-12-20 NOTE — ED PROVIDER NOTE - PROGRESS NOTE DETAILS
Hemant Fernandez, ED attending: Duplex studies do not reveal any DVTs.  CBC revealing for leukocytosis of 25,000.  However, the patient is very well-appearing and does not have any symptoms consistent with infection or sepsis.  Leukocytosis may be related to his chemotherapy. proBNP is minimally elevated.  Regardless, the patient does not have any symptoms of the chest including chest pain or shortness of breath, and is not tachycardic.He reports having an echocardiogram last done 8 months ago.  I counseled him to follow-up with his cardiologist as well as his oncologist Dr. Wooten.

## 2024-12-20 NOTE — ED ADULT NURSE NOTE - NSFALLRISKFACTORS_ED_ALL_ED
Detail Level: Simple Additional Notes: Wound locally infiltrated with lidocaine 1% with epi. Cauterized throughly.  Patient tolerated. Petroleum applied. Requested we apply clear bandaid pt had brought from home. No signs of bleeding upon pt leaving office. No indicators present Render Risk Assessment In Note?: no

## 2024-12-20 NOTE — ED PROVIDER NOTE - CLINICAL SUMMARY MEDICAL DECISION MAKING FREE TEXT BOX
54-year-old male with past medical history of gastric carcinoma, currently on chemotherapy last session was 9 days ago, presents for 1 week of bilateral leg swelling from feet up to just below the knee.  Patient states that the swelling is unchanged whether it is the morning or the evening, and did not improve with elevation.  Reports significant discomfort to the feet and calves after walking just 5 minutes.  He denies any chest pain or shortness of breath.  Denies any recent fevers.  Denies any history of prior VTEs.    Physical Exam  GEN: Alert and oriented x 3, in no acute distress, speaking full clear sentences  HEENT: NC/AT, PERRL, EOMI, normal oropharynx  NECK: Supple, nontender, FROM  CV: RRR, no m/r/g  PULM: CTA bilat, no wheezing/rales/rhonchi  ABD: Soft, nontender, nondistended. No organomegaly  EXTR: FROM to all extremities, 1+ bilateral lower extremity mild pitting edema from feet up to mid calf.  Calves are nontender, no erythema, no rash.  SKIN: Warm, dry, no rash  NEURO: AOx3, speaking full clear sentences, GRIFFITHS 5/5 strength, ambulating with stable gait    Differential diagnoses include new venous distention versus adverse effect of chemotherapy versus possible DVT.  Will pursue labs, duplex studies.  Reassess.

## 2024-12-20 NOTE — ED PROVIDER NOTE - PATIENT PORTAL LINK FT
You can access the FollowMyHealth Patient Portal offered by [facility] by registering at the following website: [email or web address]. By joining [facility]’s FollowMyHealth portal, you will also be able to view your health information using other applications (apps) compatible with our system.

## 2024-12-20 NOTE — ED ADULT NURSE NOTE - CHPI ED NUR DURATION
POSTPARTUM DISCHARGE INSTRUCTIONS FOR MOM    YOB: 1987   Age: 30 y.o.               Admit Date: 4/13/2018     Discharge Date: 4/15/2018  Attending Doctor:  Abram Dupree M.D.                  Allergies:  Nkda [no known drug allergy]    Discharged to home by car. Discharged via wheelchair, hospital escort: Yes.  Special equipment needed: Not Applicable  Belongings with: Personal  Be sure to schedule a follow-up appointment with your primary care doctor or any specialists as instructed.     Discharge Plan:   Diet Plan: Discussed  Activity Level: Discussed  Confirmed Follow up Appointment: Appointment Scheduled  Confirmed Symptoms Management: Discussed  Medication Reconciliation Updated: Yes  Influenza Vaccine Indication: Indicated: Not available from distributor/    REASONS TO CALL YOUR OBSTETRICIAN:  1.   Persistent fever or shaking chills (Temperature higher than 100.4)  2.   Heavy bleeding (soaking more than 1 pad per hour); Passing clots  3.   Foul odor from vagina  4.   Mastitis (Breast infection; breast pain, chills, fever, redness)  5.   Urinary pain, burning or frequency  6.   Episiotomy infection  7.   Abdominal incision infection  8.   Severe depression longer than 24 hours    HAND WASHING  · Prior to handling the baby.  · Before breastfeeding or bottle feeding baby.  · After using the bathroom or changing the baby's diaper.    VAGINAL CARE  · Nothing inside vagina for 6 weeks: no sexual intercourse, tampons or douching.  · Bleeding may continue for 2-4 weeks.  Amount may vary.    · Call your physician for heavy bleeding which means soaking more than 1 pad per hour    BIRTH CONTROL  · It is possible to become pregnant at any time after delivery and while breastfeeding.  · Plan to discuss a method of birth control with your physician at your follow up visit. visit.    DIET AND ELIMINATION  · Eating more fiber (bran cereal, fruits, and vegetables) and drinking plenty of fluids will  "help to avoid constipation.  · Urinary frequency after childbirth is normal.    POSTPARTUM BLUES  During the first few days after birth, you may experience a sense of the \"blues\" which may include impatience, irritability or even crying.  These feeling come and go quickly.  However, as many as 1 in 10 women experience emotional symptoms known as postpartum depression.    Postpartum depression:  May start as early as the second or third day after delivery or take several weeks or months to develop.  Symptoms of \"blues\" are present, but are more intense:  Crying spells; loss of appetite; feelings of hopelessness or loss of control; fear of touching the baby; over concern or no concern at all about the baby; little or no concern about your own appearance/caring for yourself; and/or inability to sleep or excessive sleeping.  Contact your physician if you are experiencing any of these symptoms.    Crisis Hotline:  · [place] Crisis Hotline:  1-837-KYBUGIF  Or 1-417.460.3214  · [place] Crisis Hotline:  1-633.366.9737  Or 648-070-3036    PREVENTING SHAKEN BABY:  If you are angry or stressed, PUT THE BABY IN THE CRIB, step away, take some deep breaths, and wait until you are calm to care for the baby.  DO NOT SHAKE THE BABY.  You are not alone, call a supporter for help.    · Crisis Call Center 24/7 crisis line 914-770-6321 or 1-430.571.8215  · You can also text them, text \"ANSWER\" to 529210    QUIT SMOKING/TOBACCO USE:  I understand the use of any tobacco products increases my chance of suffering from future heart disease and could cause other illnesses which may shorten my life. Quitting the use of tobacco products is the single most important thing I can do to improve my health. For further information on smoking / tobacco cessation call a Toll Free Quit Line at 1-921.750.1279 (*[employer] [place]) or 1-348.874.1869 (American Lung Association) or you can access the web based program at www.lungusa.org.    · [place] " Tobacco Users Help Line:  (601) 132-3966       Toll Free: 1-876.611.4894  · Quit Tobacco Program [facility] (808)381-5419    DEPRESSION / SUICIDE RISK:  As you are discharged from this [facility], it is important to learn how to keep safe from harming yourself.    Recognize the warning signs:  · Abrupt changes in personality, positive or negative- including increase in energy   · Giving away possessions  · Change in eating patterns- significant weight changes-  positive or negative  · Change in sleeping patterns- unable to sleep or sleeping all the time   · Unwillingness or inability to communicate  · Depression  · Unusual sadness, discouragement and loneliness  · Talk of wanting to die  · Neglect of personal appearance   · Rebelliousness- reckless behavior  · Withdrawal from people/activities they love  · Confusion- inability to concentrate     If you or a loved one observes any of these behaviors or has concerns about self-harm, here's what you can do:  · Talk about it- your feelings and reasons for harming yourself  · Remove any means that you might use to hurt yourself (examples: pills, rope, extension cords, firearm)  · Get professional help from the community (Mental Health, Substance Abuse, psychological counseling)  · Do not be alone:Call your Safe Contact- someone whom you trust who will be there for you.  · Call your local CRISIS HOTLINE 350-8579 or 227-477-2270  · Call your local Children's Mobile Crisis Response Team [place] (350) 547-6862 or www.[employer] [facility]  · Call the toll free National Suicide Prevention Hotlines   · National Suicide Prevention Lifeline 134-750-YWEF (7470)  · National Hope Line Network 800-SUICIDE (502-9088)    DISCHARGE SURVEY:  Thank you for choosing [facility].  We hope we provided you with very good care.  You may be receiving a survey in the mail.  Please fill it out.  Your opinion is valuable to us.    ADDITIONAL EDUCATIONAL  MATERIALS GIVEN TO PATIENT:        My signature on this form indicates that:  1.  I have reviewed and understand the above information  2.  My questions regarding this information have been answered to my satisfaction.  3.  I have formulated a plan with my discharge nurse to obtain my prescribed medication for home.     1/week(s)

## 2024-12-20 NOTE — ED POST DISCHARGE NOTE - RESULT SUMMARY
After I had discharged the patient, I received a call from radiologist informing me that she sees a small retrocardiac pneumonia.  I immediately called the patient and his niece Lauren picked up the phone.  I informed her that he needs to be started on antibiotics to treat this pneumonia and told him to go to at this pharmacy immediately to go  the medications.  She verbalized understanding and said that he was actually next her and was able to relay the message to him immediately.  I prescribed Augmentin 875 twice daily x 7 days, and azithromycin 250 once daily for 5 days with 500 mg on day 1.

## 2024-12-20 NOTE — ED ADULT NURSE NOTE - OBJECTIVE STATEMENT
55 y/o male ambulatory Pt A &O x3, PT c/o pain and swelling in both legs x 1 week. Hx stomach CA on chemo. Last chemo last week Pt reports pain of 5/10. Pt denies chest pain, SOB, nausea, vomiting, dizziness, fever, cough, chills. + Pulses + sensation to lower extrms.

## 2024-12-24 ENCOUNTER — APPOINTMENT (OUTPATIENT)
Dept: CT IMAGING | Facility: CLINIC | Age: 54
End: 2024-12-24
Payer: COMMERCIAL

## 2024-12-24 PROCEDURE — 74177 CT ABD & PELVIS W/CONTRAST: CPT

## 2025-01-07 ENCOUNTER — INPATIENT (INPATIENT)
Facility: HOSPITAL | Age: 55
LOS: 8 days | Discharge: HOME | DRG: 188 | End: 2025-01-16
Attending: INTERNAL MEDICINE | Admitting: INTERNAL MEDICINE
Payer: COMMERCIAL

## 2025-01-07 VITALS
SYSTOLIC BLOOD PRESSURE: 127 MMHG | TEMPERATURE: 98 F | HEART RATE: 90 BPM | HEIGHT: 63.39 IN | RESPIRATION RATE: 16 BRPM | OXYGEN SATURATION: 95 % | WEIGHT: 136.91 LBS | DIASTOLIC BLOOD PRESSURE: 77 MMHG

## 2025-01-07 DIAGNOSIS — I10 ESSENTIAL (PRIMARY) HYPERTENSION: ICD-10-CM

## 2025-01-07 DIAGNOSIS — Z29.9 ENCOUNTER FOR PROPHYLACTIC MEASURES, UNSPECIFIED: ICD-10-CM

## 2025-01-07 DIAGNOSIS — C16.9 MALIGNANT NEOPLASM OF STOMACH, UNSPECIFIED: ICD-10-CM

## 2025-01-07 DIAGNOSIS — J90 PLEURAL EFFUSION, NOT ELSEWHERE CLASSIFIED: ICD-10-CM

## 2025-01-07 LAB
ALBUMIN SERPL ELPH-MCNC: 2.2 G/DL — LOW (ref 3.5–5)
ALP SERPL-CCNC: 125 U/L — HIGH (ref 40–120)
ALT FLD-CCNC: 24 U/L DA — SIGNIFICANT CHANGE UP (ref 10–60)
ANION GAP SERPL CALC-SCNC: 6 MMOL/L — SIGNIFICANT CHANGE UP (ref 5–17)
AST SERPL-CCNC: 37 U/L — SIGNIFICANT CHANGE UP (ref 10–40)
BASOPHILS # BLD AUTO: 0.09 K/UL — SIGNIFICANT CHANGE UP (ref 0–0.2)
BASOPHILS NFR BLD AUTO: 0.8 % — SIGNIFICANT CHANGE UP (ref 0–2)
BILIRUB SERPL-MCNC: 1.1 MG/DL — SIGNIFICANT CHANGE UP (ref 0.2–1.2)
BUN SERPL-MCNC: 9 MG/DL — SIGNIFICANT CHANGE UP (ref 7–18)
CALCIUM SERPL-MCNC: 8.3 MG/DL — LOW (ref 8.4–10.5)
CHLORIDE SERPL-SCNC: 112 MMOL/L — HIGH (ref 96–108)
CO2 SERPL-SCNC: 26 MMOL/L — SIGNIFICANT CHANGE UP (ref 22–31)
CREAT SERPL-MCNC: 0.58 MG/DL — SIGNIFICANT CHANGE UP (ref 0.5–1.3)
EGFR: 117 ML/MIN/1.73M2 — SIGNIFICANT CHANGE UP
EOSINOPHIL # BLD AUTO: 0.19 K/UL — SIGNIFICANT CHANGE UP (ref 0–0.5)
EOSINOPHIL NFR BLD AUTO: 1.8 % — SIGNIFICANT CHANGE UP (ref 0–6)
FLUAV AG NPH QL: SIGNIFICANT CHANGE UP
FLUBV AG NPH QL: SIGNIFICANT CHANGE UP
GLUCOSE SERPL-MCNC: 115 MG/DL — HIGH (ref 70–99)
HCT VFR BLD CALC: 38.6 % — LOW (ref 39–50)
HGB BLD-MCNC: 13 G/DL — SIGNIFICANT CHANGE UP (ref 13–17)
IMM GRANULOCYTES NFR BLD AUTO: 0.8 % — SIGNIFICANT CHANGE UP (ref 0–0.9)
LYMPHOCYTES # BLD AUTO: 1.92 K/UL — SIGNIFICANT CHANGE UP (ref 1–3.3)
LYMPHOCYTES # BLD AUTO: 17.9 % — SIGNIFICANT CHANGE UP (ref 13–44)
MAGNESIUM SERPL-MCNC: 1.7 MG/DL — SIGNIFICANT CHANGE UP (ref 1.6–2.6)
MCHC RBC-ENTMCNC: 30.9 PG — SIGNIFICANT CHANGE UP (ref 27–34)
MCHC RBC-ENTMCNC: 33.7 G/DL — SIGNIFICANT CHANGE UP (ref 32–36)
MCV RBC AUTO: 91.7 FL — SIGNIFICANT CHANGE UP (ref 80–100)
MONOCYTES # BLD AUTO: 1.36 K/UL — HIGH (ref 0–0.9)
MONOCYTES NFR BLD AUTO: 12.7 % — SIGNIFICANT CHANGE UP (ref 2–14)
NEUTROPHILS # BLD AUTO: 7.07 K/UL — SIGNIFICANT CHANGE UP (ref 1.8–7.4)
NEUTROPHILS NFR BLD AUTO: 66 % — SIGNIFICANT CHANGE UP (ref 43–77)
NRBC # BLD: 0 /100 WBCS — SIGNIFICANT CHANGE UP (ref 0–0)
NT-PROBNP SERPL-SCNC: 202 PG/ML — HIGH (ref 0–125)
PHOSPHATE SERPL-MCNC: 2.7 MG/DL — SIGNIFICANT CHANGE UP (ref 2.5–4.5)
PLATELET # BLD AUTO: 83 K/UL — LOW (ref 150–400)
POTASSIUM SERPL-MCNC: 3.9 MMOL/L — SIGNIFICANT CHANGE UP (ref 3.5–5.3)
POTASSIUM SERPL-SCNC: 3.9 MMOL/L — SIGNIFICANT CHANGE UP (ref 3.5–5.3)
PROT SERPL-MCNC: 4.8 G/DL — LOW (ref 6–8.3)
RBC # BLD: 4.21 M/UL — SIGNIFICANT CHANGE UP (ref 4.2–5.8)
RBC # FLD: 18.2 % — HIGH (ref 10.3–14.5)
RSV RNA NPH QL NAA+NON-PROBE: SIGNIFICANT CHANGE UP
SARS-COV-2 RNA SPEC QL NAA+PROBE: SIGNIFICANT CHANGE UP
SODIUM SERPL-SCNC: 144 MMOL/L — SIGNIFICANT CHANGE UP (ref 135–145)
TROPONIN I, HIGH SENSITIVITY RESULT: 13.4 NG/L — SIGNIFICANT CHANGE UP
WBC # BLD: 10.72 K/UL — HIGH (ref 3.8–10.5)
WBC # FLD AUTO: 10.72 K/UL — HIGH (ref 3.8–10.5)

## 2025-01-07 PROCEDURE — 71275 CT ANGIOGRAPHY CHEST: CPT | Mod: 26

## 2025-01-07 PROCEDURE — 99285 EMERGENCY DEPT VISIT HI MDM: CPT

## 2025-01-07 RX ORDER — SODIUM CHLORIDE 9 MG/ML
500 INJECTION, SOLUTION INTRAMUSCULAR; INTRAVENOUS; SUBCUTANEOUS ONCE
Refills: 0 | Status: COMPLETED | OUTPATIENT
Start: 2025-01-07 | End: 2025-01-07

## 2025-01-07 RX ORDER — MAG HYDROX/ALUMINUM HYD/SIMETH 200-200-20
30 SUSPENSION, ORAL (FINAL DOSE FORM) ORAL EVERY 4 HOURS
Refills: 0 | Status: DISCONTINUED | OUTPATIENT
Start: 2025-01-07 | End: 2025-01-16

## 2025-01-07 RX ORDER — ENOXAPARIN SODIUM 60 MG/.6ML
40 INJECTION INTRAVENOUS; SUBCUTANEOUS EVERY 24 HOURS
Refills: 0 | Status: DISCONTINUED | OUTPATIENT
Start: 2025-01-07 | End: 2025-01-09

## 2025-01-07 RX ORDER — FUROSEMIDE 20 MG
40 TABLET ORAL
Refills: 0 | Status: DISCONTINUED | OUTPATIENT
Start: 2025-01-07 | End: 2025-01-15

## 2025-01-07 RX ORDER — ACETAMINOPHEN 80 MG/.8ML
650 SOLUTION/ DROPS ORAL EVERY 6 HOURS
Refills: 0 | Status: DISCONTINUED | OUTPATIENT
Start: 2025-01-07 | End: 2025-01-16

## 2025-01-07 RX ORDER — ONDANSETRON 4 MG/1
4 TABLET ORAL EVERY 8 HOURS
Refills: 0 | Status: DISCONTINUED | OUTPATIENT
Start: 2025-01-07 | End: 2025-01-16

## 2025-01-07 RX ORDER — GINKGO BILOBA 40 MG
3 CAPSULE ORAL AT BEDTIME
Refills: 0 | Status: DISCONTINUED | OUTPATIENT
Start: 2025-01-07 | End: 2025-01-16

## 2025-01-07 RX ORDER — FUROSEMIDE 20 MG
20 TABLET ORAL ONCE
Refills: 0 | Status: COMPLETED | OUTPATIENT
Start: 2025-01-07 | End: 2025-01-07

## 2025-01-07 RX ORDER — ONDANSETRON 4 MG/1
1 TABLET ORAL
Refills: 0 | DISCHARGE

## 2025-01-07 RX ORDER — SODIUM CHLORIDE 9 MG/ML
1000 INJECTION, SOLUTION INTRAMUSCULAR; INTRAVENOUS; SUBCUTANEOUS ONCE
Refills: 0 | Status: DISCONTINUED | OUTPATIENT
Start: 2025-01-07 | End: 2025-01-07

## 2025-01-07 RX ADMIN — SODIUM CHLORIDE 500 MILLILITER(S): 9 INJECTION, SOLUTION INTRAMUSCULAR; INTRAVENOUS; SUBCUTANEOUS at 12:06

## 2025-01-07 RX ADMIN — Medication 20 MILLIGRAM(S): at 16:57

## 2025-01-07 RX ADMIN — SODIUM CHLORIDE 500 MILLILITER(S): 9 INJECTION, SOLUTION INTRAMUSCULAR; INTRAVENOUS; SUBCUTANEOUS at 17:22

## 2025-01-07 NOTE — ED PROVIDER NOTE - CLINICAL SUMMARY MEDICAL DECISION MAKING FREE TEXT BOX
53 y/o M PMH of metastatic Gastric Ca s/p J tube, HTN presenting with dyspnea x 3 weeks.   r/o PE as has active malignancy   Check screening labs, pBNP, trop  No prior Echo available for review   EKG  Flu/Covid  Dispo as per above eval. 55 y/o M PMH of metastatic Gastric Ca s/p J tube, HTN presenting with dyspnea x 3 weeks.   r/o PE as has active malignancy   Check screening labs, pBNP, trop  No prior Echo available for review   EKG  Flu/Covid  Dispo as per above eval.    Found have b/l pleural effusions- mod and LE edema. Dose furosemide. Admit for Echo and further diuresis.

## 2025-01-07 NOTE — ED ADULT NURSE REASSESSMENT NOTE - NS ED NURSE REASSESS COMMENT FT1
endorsed pt to MARYSE Dennis.
pt sob with little exertion pt on cardiac monitoring fall precautions pending bed on floor no acute complains pt was given lasix pt ambulating to bathroom. as time no cp no sob

## 2025-01-07 NOTE — ED PROVIDER NOTE - PROGRESS NOTE DETAILS
CT with increasing b/l effusions. Will trial Lasix 20 mg. Patient has appt with his cardiologist this Friday but states that he feels winded with walking. Will admit for Echo tomorrow and further diuresis if responds. d/w Dr. Hernandez

## 2025-01-07 NOTE — H&P ADULT - NSHPREVIEWOFSYSTEMS_GEN_ALL_CORE
T(C): 36.7 (01-07-25 @ 18:52), Max: 36.8 (01-07-25 @ 09:26)  HR: 77 (01-07-25 @ 18:52) (67 - 90)  BP: 147/75 (01-07-25 @ 18:52) (127/77 - 149/91)  RR: 16 (01-07-25 @ 18:52) (16 - 18)  SpO2: 97% (01-07-25 @ 18:52) (94% - 97%)    REVIEW OF SYSTEMS:  CONSTITUTIONAL: No weakness, fevers or chills  EYES/ENT: No visual changes;  No vertigo or throat pain   NECK: No pain or stiffness  RESPIRATORY: No cough, wheezing, hemoptysis; + shortness of breath  CARDIOVASCULAR: No chest pain or palpitations  GASTROINTESTINAL: No abdominal or epigastric pain. No nausea, vomiting, or hematemesis; No diarrhea or constipation. No melena or hematochezia.  GENITOURINARY: No dysuria, frequency or hematuria  NEUROLOGICAL: No numbness or weakness  SKIN: No itching, rashes

## 2025-01-07 NOTE — ED PROVIDER NOTE - PHYSICAL EXAMINATION
GENERAL: no acute distress; +alopecia   HEAD:  Atraumatic, Normocephalic  EYES: EOMI, PERRLA, conjunctiva and sclera clear  ENT: MMM; oropharynx clear  NECK: Supple, No JVD  CHEST/LUNG: Clear to auscultation bilaterally; No wheeze  HEART: Regular rate and rhythm; No murmurs, rubs, or gallops  ABDOMEN: Soft, Nontender, Nondistended; Bowel sounds present +Jtube  EXTREMITIES:  2+ Peripheral Pulses. 2+ pitting LE edema.   PSYCH: AAOx3  NEUROLOGY: no focal motor or sensory deficits. 5/5 muscle strength in all extremities.   SKIN: No rashes or lesions GENERAL: no acute distress; +alopecia   HEAD:  Atraumatic, Normocephalic  EYES: EOMI, PERRLA, conjunctiva and sclera clear  ENT: MMM; oropharynx clear  NECK: Supple, No JVD  CHEST/LUNG: decreased breath sounds bases   HEART: Regular rate and rhythm; No murmurs, rubs, or gallops  ABDOMEN: Soft, Nontender, Nondistended; Bowel sounds present +Jtube  EXTREMITIES:  2+ Peripheral Pulses. 2+ pitting LE edema.   PSYCH: AAOx3  NEUROLOGY: no focal motor or sensory deficits. 5/5 muscle strength in all extremities.   SKIN: No rashes or lesions

## 2025-01-07 NOTE — H&P ADULT - NSHPSOCIALHISTORY_GEN_ALL_CORE
lives at home by himself  former smoker 6-7cigs/day for 40yrs stopped last year  denies alcohol use  ambualtes independently

## 2025-01-07 NOTE — ED PROVIDER NOTE - OBJECTIVE STATEMENT
53 y/o M PMH of metastatic Gastric Ca s/p J tube, HTN presenting with dyspnea x 3 weeks.     Last chemotherapy QMA on 12/11. Reports dyspnea, brittney with exertion only able to climb 5-6 steps. Reports occasional SSCP but no fevers/chills. Has chronic LE edema. Currently on FLOT regimen. 53 y/o M PMH of metastatic Gastric Ca s/p J tube, HTN presenting with dyspnea x 3 weeks.     Last chemotherapy QMA on 12/16. Reports dyspnea, brittney with exertion only able to climb 5-6 steps. Reports occasional SSCP but no fevers/chills. Has chronic LE edema. Currently on FLOT regimen.

## 2025-01-07 NOTE — H&P ADULT - ASSESSMENT
Patient is a 54yM, pmhx of CA gastric w/ mets on FLOT chemo s/p J tube removal, HTN. Patient presented with dyspnea on exertion, palpitation on exertion and chronic bilateral lower extremity swelling for the past 3 weeks. In the ED  VSS saturating well on RA. proBNP 202. CT angio  chest: negative for PE. bilateral progressive moderately sized bilateral pleural effusions. Patient is being admitted for management of pleural effusion likely malignant in origin.

## 2025-01-07 NOTE — ED ADULT NURSE NOTE - NSFALLUNIVINTERV_ED_ALL_ED
Bed/Stretcher in lowest position, wheels locked, appropriate side rails in place/Call bell, personal items and telephone in reach/Instruct patient to call for assistance before getting out of bed/chair/stretcher/Non-slip footwear applied when patient is off stretcher/[place] to call system/Physically safe environment - no spills, clutter or unnecessary equipment/Purposeful proactive rounding/Room/bathroom lighting operational, light cord in reach

## 2025-01-07 NOTE — H&P ADULT - NSHPPHYSICALEXAM_GEN_ALL_CORE
PHYSICAL EXAM:  GENERAL: NAD, speaks in full sentences, no signs of respiratory distress  HEAD:  Atraumatic, Normocephalic  EYES: EOMI, conjunctiva and sclera clear  NECK: Supple, No JVD  CHEST/LUNG: bilaterally decreased breath sounds; No wheeze; No crackles; No accessory muscles used  HEART: Regular rate and rhythm; No murmurs;   ABDOMEN: Soft, Nontender, Nondistended; Bowel sounds present; No guarding  EXTREMITIES:  2+ Peripheral Pulses, No cyanosis. bilateral grade 3 pitting pedal edema   PSYCH: AAOx3  NEUROLOGY: non-focal  SKIN: No rashes or lesions

## 2025-01-07 NOTE — H&P ADULT - HISTORY OF PRESENT ILLNESS
Patient is a 54M, Pmhx of CA gastric w/ mets on FLOT chemo s/p J tube removal, HTN. Patient presented with 3 weeks of shortness of breath for 3 week he is currently able to walk 1 block without feeling sob prior to the onset of symptoms he was able to walk 10 blocks. It is associated with palpitations on walking. He has progressive bilateral leg swelling. He noticed mild abdominal distension. He denies chest pain, orthopnea, pnd, lightheadedness, cough, rhinorrhea,  facial pain, n/v/d/c.     Patient's recent chemo session was 11/16 with Dr. Palma at [facility]. He finished 4 sessions so far.

## 2025-01-07 NOTE — ED ADULT NURSE NOTE - OBJECTIVE STATEMENT
Pt 53y/o male c/o generalized weakness pt AAOx4, breathing room air moving all extremities exertional on exertion bilateral LE edema up to calf +4 pitting edema pt denies cough fever chills diarrhea, no cp no sob at this time

## 2025-01-07 NOTE — H&P ADULT - PROBLEM SELECTOR PLAN 2
hx of CA gastric with mets   on chemotherapy on FLOT therapy, 4th session last session on 12/16  follows Dr. Louie ENGLAND   to consult SAMANTA in the AM

## 2025-01-07 NOTE — H&P ADULT - REASON FOR ADMISSION
Pleural effusion Banner Transposition Flap Text: The defect edges were debeveled with a #15 scalpel blade.  Given the location of the defect and the proximity to free margins a Banner transposition flap was deemed most appropriate.  Using a sterile surgical marker, an appropriate flap drawn around the defect. The area thus outlined was incised deep to adipose tissue with a #15 scalpel blade.  The skin margins were undermined to an appropriate distance in all directions utilizing iris scissors.

## 2025-01-07 NOTE — H&P ADULT - PROBLEM SELECTOR PLAN 1
p/w dyspnea on exertion, palpitations on exertion and chronic LE grade 3 pitting pedal edema for the past 3 weeks.  history of CA gastric w/ mets   saturating well on RA   in the ED s/p IV lasix 40   CXR and CTangio Chest: progressive moderately sized pleural effusions, no pe  pleural effusion likely malignant in nature  starting IV lasix 40 BID   saturating well on RA however if patient sat <94% to start supplimental oxygen p/w dyspnea on exertion, palpitations on exertion and chronic LE grade 3 pitting pedal edema for the past 3 weeks.  history of CA gastric w/ mets   saturating well on RA   in the ED s/p IV lasix 40   CXR and CTangio Chest: progressive moderately sized pleural effusions, no pe  pleural effusion likely malignant in nature  starting IV lasix 40 BID   saturating well on RA however if patient sat <94% to start supplimental oxygen  Dr. Martin consulted

## 2025-01-08 DIAGNOSIS — E87.6 HYPOKALEMIA: ICD-10-CM

## 2025-01-08 LAB
ALBUMIN SERPL ELPH-MCNC: 2.2 G/DL — LOW (ref 3.5–5)
ANION GAP SERPL CALC-SCNC: 8 MMOL/L — SIGNIFICANT CHANGE UP (ref 5–17)
BUN SERPL-MCNC: 8 MG/DL — SIGNIFICANT CHANGE UP (ref 7–18)
CALCIUM SERPL-MCNC: 8.2 MG/DL — LOW (ref 8.4–10.5)
CHLORIDE SERPL-SCNC: 111 MMOL/L — HIGH (ref 96–108)
CO2 SERPL-SCNC: 25 MMOL/L — SIGNIFICANT CHANGE UP (ref 22–31)
CREAT SERPL-MCNC: 0.56 MG/DL — SIGNIFICANT CHANGE UP (ref 0.5–1.3)
EGFR: 118 ML/MIN/1.73M2 — SIGNIFICANT CHANGE UP
GLUCOSE SERPL-MCNC: 102 MG/DL — HIGH (ref 70–99)
HCT VFR BLD CALC: 37.6 % — LOW (ref 39–50)
HGB BLD-MCNC: 12.8 G/DL — LOW (ref 13–17)
MAGNESIUM SERPL-MCNC: 1.6 MG/DL — SIGNIFICANT CHANGE UP (ref 1.6–2.6)
MCHC RBC-ENTMCNC: 31.2 PG — SIGNIFICANT CHANGE UP (ref 27–34)
MCHC RBC-ENTMCNC: 34 G/DL — SIGNIFICANT CHANGE UP (ref 32–36)
MCV RBC AUTO: 91.7 FL — SIGNIFICANT CHANGE UP (ref 80–100)
NRBC # BLD: 0 /100 WBCS — SIGNIFICANT CHANGE UP (ref 0–0)
PHOSPHATE SERPL-MCNC: 2.9 MG/DL — SIGNIFICANT CHANGE UP (ref 2.5–4.5)
PLATELET # BLD AUTO: 93 K/UL — LOW (ref 150–400)
POTASSIUM SERPL-MCNC: 2.9 MMOL/L — CRITICAL LOW (ref 3.5–5.3)
POTASSIUM SERPL-SCNC: 2.9 MMOL/L — CRITICAL LOW (ref 3.5–5.3)
RBC # BLD: 4.1 M/UL — LOW (ref 4.2–5.8)
RBC # FLD: 18 % — HIGH (ref 10.3–14.5)
SODIUM SERPL-SCNC: 144 MMOL/L — SIGNIFICANT CHANGE UP (ref 135–145)
WBC # BLD: 9.18 K/UL — SIGNIFICANT CHANGE UP (ref 3.8–10.5)
WBC # FLD AUTO: 9.18 K/UL — SIGNIFICANT CHANGE UP (ref 3.8–10.5)

## 2025-01-08 RX ORDER — PANTOPRAZOLE 40 MG/1
40 TABLET, DELAYED RELEASE ORAL
Refills: 0 | Status: DISCONTINUED | OUTPATIENT
Start: 2025-01-09 | End: 2025-01-16

## 2025-01-08 RX ORDER — POTASSIUM CHLORIDE 600 MG/1
40 TABLET, FILM COATED, EXTENDED RELEASE ORAL EVERY 4 HOURS
Refills: 0 | Status: COMPLETED | OUTPATIENT
Start: 2025-01-08 | End: 2025-01-08

## 2025-01-08 RX ADMIN — POTASSIUM CHLORIDE 40 MILLIEQUIVALENT(S): 600 TABLET, FILM COATED, EXTENDED RELEASE ORAL at 08:41

## 2025-01-08 RX ADMIN — POTASSIUM CHLORIDE 40 MILLIEQUIVALENT(S): 600 TABLET, FILM COATED, EXTENDED RELEASE ORAL at 13:17

## 2025-01-08 RX ADMIN — Medication 40 MILLIGRAM(S): at 05:27

## 2025-01-08 RX ADMIN — Medication 40 MILLIGRAM(S): at 13:17

## 2025-01-08 RX ADMIN — ENOXAPARIN SODIUM 40 MILLIGRAM(S): 60 INJECTION INTRAVENOUS; SUBCUTANEOUS at 05:27

## 2025-01-08 NOTE — CONSULT NOTE ADULT - SUBJECTIVE AND OBJECTIVE BOX
Time of visit:    CHIEF COMPLAINT: Patient is a 54y old  Male who presents with a chief complaint of Pleural effusion, not elsewhere classified     (08 Jan 2025 16:05)      HPI: This  is a 54 man with  CA gastric w/ mets on FLOT chemo s/p J tube removal, HTN. Patient presented with 3 weeks of shortness of breath for 3 week he is currently able to walk 1 block without feeling sob prior to the onset of symptoms he was able to walk 10 blocks. It is associated with palpitations on walking. He has progressive bilateral leg swelling. He noticed mild abdominal distension. He denies chest pain, orthopnea, pnd, lightheadedness, cough, rhinorrhea,  facial pain, n/v/d/c.     Patient's recent chemo session was 11/16 with Dr. Palma at [facility]. He finished 4 sessions so far. (07 Jan 2025 19:37)   Patient seen and examined.     PAST MEDICAL & SURGICAL HISTORY:  HTN (hypertension)      History of prediabetes      Hyperlipidemia      Cigarette smoker      Malignant neoplasm of body of stomach      No significant past surgical history          Allergies    No Known Allergies    Intolerances        MEDICATIONS  (STANDING):  enoxaparin Injectable 40 milliGRAM(s) SubCutaneous every 24 hours  furosemide   Injectable 40 milliGRAM(s) IV Push two times a day      MEDICATIONS  (PRN):  acetaminophen     Tablet .. 650 milliGRAM(s) Oral every 6 hours PRN Temp greater or equal to 38C (100.4F), Mild Pain (1 - 3)  aluminum hydroxide/magnesium hydroxide/simethicone Suspension 30 milliLiter(s) Oral every 4 hours PRN Dyspepsia  melatonin 3 milliGRAM(s) Oral at bedtime PRN Insomnia  ondansetron Injectable 4 milliGRAM(s) IV Push every 8 hours PRN Nausea and/or Vomiting   Medications up to date at time of exam.    Medications up to date at time of exam.    FAMILY HISTORY:  No pertinent family history in first degree relatives        SOCIAL HISTORY  Smoking History: [   ] smoking/smoke exposure, [   ] former smoker  Living Condition: [   ] apartment, [   ] private house  Work History:   Travel History: denies recent travel  Illicit Substance Use: denies  Alcohol Use: denies    REVIEW OF SYSTEMS:    CONSTITUTIONAL:  denies fevers, chills, sweats, weight loss    HEENT:  denies diplopia or blurred vision, sore throat or runny nose.    CARDIOVASCULAR:  denies pressure, squeezing, tightness, or heaviness about the chest; no palpitations.    RESPIRATORY:  denies SOB, cough, JOHNSON, wheezing.    GASTROINTESTINAL:  denies abdominal pain, nausea, vomiting or diarrhea.    GENITOURINARY: denies dysuria, frequency or urgency.    NEUROLOGIC:  denies numbness, tingling, seizures or weakness.    PSYCHIATRIC:  denies disorder of thought or mood.    MSK: denies swelling, redness      PHYSICAL EXAMINATION:    GENERAL: The patient  in no apparent distress.     Vital Signs Last 24 Hrs  T(C): 36.6 (08 Jan 2025 12:51), Max: 37.2 (08 Jan 2025 05:34)  T(F): 97.8 (08 Jan 2025 12:51), Max: 99 (08 Jan 2025 05:34)  HR: 73 (08 Jan 2025 12:51) (73 - 88)  BP: 115/61 (08 Jan 2025 12:51) (105/63 - 145/76)  BP(mean): 89 (08 Jan 2025 00:26) (89 - 89)  RR: 18 (08 Jan 2025 12:51) (18 - 18)  SpO2: 98% (08 Jan 2025 12:51) (92% - 98%)    Parameters below as of 08 Jan 2025 12:51  Patient On (Oxygen Delivery Method): nasal cannula       (if applicable)    Chest Tube (if applicable)    HEENT: Head is normocephalic and atraumatic. Extraocular muscles are intact. Mucous membranes are moist.     NECK: Supple, no palpable adenopathy.    LUNGS: Fair b/l breath sounds, no wheezing, rales, or rhonchi.    HEART: Regular rate and rhythm without murmur.    ABDOMEN: Soft, nontender, and nondistended.  No hepatosplenomegaly is noted.    RENAL: No difficulty voiding, no pelvic pain    EXTREMITIES: Without any cyanosis, clubbing, rash, lesions or edema.    NEUROLOGIC: Awake, alert, oriented, grossly intact    SKIN: Warm, dry, good turgor.      LABS:                        12.8   9.18  )-----------( 93       ( 08 Jan 2025 06:45 )             37.6     01-08    144  |  111[H]  |  8   ----------------------------<  102[H]  2.9[LL]   |  25  |  0.56    Ca    8.2[L]      08 Jan 2025 06:45  Phos  2.9     01-08  Mg     1.6     01-08    TPro  x   /  Alb  2.2[L]  /  TBili  x   /  DBili  x   /  AST  x   /  ALT  x   /  AlkPhos  x   01-08      Urinalysis Basic - ( 08 Jan 2025 06:45 )    Color: x / Appearance: x / SG: x / pH: x  Gluc: 102 mg/dL / Ketone: x  / Bili: x / Urobili: x   Blood: x / Protein: x / Nitrite: x   Leuk Esterase: x / RBC: x / WBC x   Sq Epi: x / Non Sq Epi: x / Bacteria: x      MICROBIOLOGY: (if applicable)    RADIOLOGY & ADDITIONAL STUDIES:  EKG:   CXR:< from: CT Angio Chest PE Protocol w/ IV Cont (01.07.25 @ 13:05) >  IMPRESSION:  No evidence of pulmonary embolism.    Moderate bilateral pleural effusions with adjacent compressive   atelectasis have increased since December 24, 2024.      < end of copied text >    ECHO:    IMPRESSION: 54y Male PAST MEDICAL & SURGICAL HISTORY:  HTN (hypertension)      History of prediabetes      Hyperlipidemia      Cigarette smoker      Malignant neoplasm of body of stomach      No significant past surgical history       p/w                  Time of visit:    CHIEF COMPLAINT: Patient is a 54y old  Male who presents with a chief complaint of Pleural effusion, not elsewhere classified     (08 Jan 2025 16:05)      HPI: This  is a 54 man with  CA gastric w/ mets on FLOT chemo s/p J tube removal, HTN. Patient presented with 3 weeks of shortness of breath for 3 week he is currently able to walk 1 block without feeling sob prior to the onset of symptoms he was able to walk 10 blocks. It is associated with palpitations on walking. He has progressive bilateral leg swelling. He noticed mild abdominal distension. He denies chest pain, orthopnea, pnd, lightheadedness, cough, rhinorrhea,  facial pain, n/v/d/c.     Patient's recent chemo session was 11/16 with Dr. Palma at [facility]. He finished 4 sessions so far. (07 Jan 2025 19:37)   Patient seen and examined.     PAST MEDICAL & SURGICAL HISTORY:  HTN (hypertension)      History of prediabetes      Hyperlipidemia      Cigarette smoker      Malignant neoplasm of body of stomach      No significant past surgical history          Allergies    No Known Allergies    Intolerances        MEDICATIONS  (STANDING):  enoxaparin Injectable 40 milliGRAM(s) SubCutaneous every 24 hours  furosemide   Injectable 40 milliGRAM(s) IV Push two times a day      MEDICATIONS  (PRN):  acetaminophen     Tablet .. 650 milliGRAM(s) Oral every 6 hours PRN Temp greater or equal to 38C (100.4F), Mild Pain (1 - 3)  aluminum hydroxide/magnesium hydroxide/simethicone Suspension 30 milliLiter(s) Oral every 4 hours PRN Dyspepsia  melatonin 3 milliGRAM(s) Oral at bedtime PRN Insomnia  ondansetron Injectable 4 milliGRAM(s) IV Push every 8 hours PRN Nausea and/or Vomiting   Medications up to date at time of exam.    Medications up to date at time of exam.    FAMILY HISTORY:  No pertinent family history in first degree relatives        SOCIAL HISTORY  Smoking History: [   ] smoking/smoke exposure, [  x ] former smoker  Living Condition: [   ] apartment, [   ] private house  Work History:   Travel History: denies recent travel  Illicit Substance Use: denies  Alcohol Use: denies    REVIEW OF SYSTEMS:    CONSTITUTIONAL:  denies fevers, chills, sweats, weight loss    HEENT:  denies diplopia or blurred vision, sore throat or runny nose.    CARDIOVASCULAR:  denies pressure, squeezing, tightness, or heaviness about the chest; no palpitations.    RESPIRATORY:  denies SOB, cough, JOHNSON, wheezing.    GASTROINTESTINAL:  denies abdominal pain, nausea, vomiting or diarrhea.    GENITOURINARY: denies dysuria, frequency or urgency.    NEUROLOGIC:  denies numbness, tingling, seizures or weakness.    PSYCHIATRIC:  denies disorder of thought or mood.    MSK: denies swelling, redness      PHYSICAL EXAMINATION:    GENERAL: The patient  in no apparent distress.     Vital Signs Last 24 Hrs  T(C): 36.6 (08 Jan 2025 12:51), Max: 37.2 (08 Jan 2025 05:34)  T(F): 97.8 (08 Jan 2025 12:51), Max: 99 (08 Jan 2025 05:34)  HR: 73 (08 Jan 2025 12:51) (73 - 88)  BP: 115/61 (08 Jan 2025 12:51) (105/63 - 145/76)  BP(mean): 89 (08 Jan 2025 00:26) (89 - 89)  RR: 18 (08 Jan 2025 12:51) (18 - 18)  SpO2: 98% (08 Jan 2025 12:51) (92% - 98%)    Parameters below as of 08 Jan 2025 12:51  Patient On (Oxygen Delivery Method): nasal cannula       (if applicable)    Chest Tube (if applicable)    HEENT: Head is normocephalic and atraumatic. Extraocular muscles are intact. Mucous membranes are moist.     NECK: Supple, no palpable adenopathy.    LUNGS: Fair b/l breath sounds, no wheezing, rales, or rhonchi.    HEART: Regular rate and rhythm without murmur.    ABDOMEN: Soft, nontender, and nondistended.  No hepatosplenomegaly is noted. + RLL scar from PEG tube ( removed)     RENAL: No difficulty voiding, no pelvic pain    EXTREMITIES: Without any cyanosis, clubbing, rash, lesions or edema.    NEUROLOGIC: Awake, alert, oriented, grossly intact    SKIN: Warm, dry, good turgor.      LABS:                        12.8   9.18  )-----------( 93       ( 08 Jan 2025 06:45 )             37.6     01-08    144  |  111[H]  |  8   ----------------------------<  102[H]  2.9[LL]   |  25  |  0.56    Ca    8.2[L]      08 Jan 2025 06:45  Phos  2.9     01-08  Mg     1.6     01-08    TPro  x   /  Alb  2.2[L]  /  TBili  x   /  DBili  x   /  AST  x   /  ALT  x   /  AlkPhos  x   01-08      Urinalysis Basic - ( 08 Jan 2025 06:45 )    Color: x / Appearance: x / SG: x / pH: x  Gluc: 102 mg/dL / Ketone: x  / Bili: x / Urobili: x   Blood: x / Protein: x / Nitrite: x   Leuk Esterase: x / RBC: x / WBC x   Sq Epi: x / Non Sq Epi: x / Bacteria: x      MICROBIOLOGY: (if applicable)    RADIOLOGY & ADDITIONAL STUDIES:  EKG:   CXR:< from: CT Angio Chest PE Protocol w/ IV Cont (01.07.25 @ 13:05) >  IMPRESSION:  No evidence of pulmonary embolism.    Moderate bilateral pleural effusions with adjacent compressive   atelectasis have increased since December 24, 2024.      < end of copied text >    ECHO:    IMPRESSION: 54y Male PAST MEDICAL & SURGICAL HISTORY:  HTN (hypertension)      History of prediabetes      Hyperlipidemia      Cigarette smoker      Malignant neoplasm of body of stomach      No significant past surgical history       p/w         IMP: This  is a 54 yr old  man with  CA gastric w/ mets on FLOT chemo s/p J tube removal, HTN. Patient presented with 3 weeks of shortness of breath for 3 week he is currently able to walk 1 block without feeling sob prior to the onset of symptoms he was able to walk 10 blocks. CTPA neg for PE but positive for b/l pleural effusion .     Sugg  - Diuresis   - Advair 250/50 Q12h   - Duoneb Q6H   - Spiriva QD  - CXR in am   - 2 DECHO   - Will consider thoracentesis if respiratory symptoms do not improve   - DVT GI prophy

## 2025-01-08 NOTE — PROGRESS NOTE ADULT - SUBJECTIVE AND OBJECTIVE BOX
Patient is a 54y old  Male who presents with a chief complaint of Pleural effusion     INTERVAL HPI/OVERNIGHT EVENTS: Denies new complaints or concerns.  Reports continued SOB/JOHNSON and LE swelling.  Reports walking to the bathroom w/ JOHNSON.  Denies HA, abdominal pain, nausea or vomiting.  LBM today, normal.      MEDICATIONS  (STANDING):  enoxaparin Injectable 40 milliGRAM(s) SubCutaneous every 24 hours  furosemide   Injectable 40 milliGRAM(s) IV Push two times a day    MEDICATIONS  (PRN):  acetaminophen     Tablet .. 650 milliGRAM(s) Oral every 6 hours PRN Temp greater or equal to 38C (100.4F), Mild Pain (1 - 3)  aluminum hydroxide/magnesium hydroxide/simethicone Suspension 30 milliLiter(s) Oral every 4 hours PRN Dyspepsia  melatonin 3 milliGRAM(s) Oral at bedtime PRN Insomnia  ondansetron Injectable 4 milliGRAM(s) IV Push every 8 hours PRN Nausea and/or Vomiting      __________________________________________________  REVIEW OF SYSTEMS:    CONSTITUTIONAL: No fever  EYES: No acute visual disturbances  NECK: No pain or stiffness  RESPIRATORY: No cough; (+) SOB & JOHNSON   CARDIOVASCULAR: No chest pain, no palpitations  GASTROINTESTINAL: No pain. No nausea or vomiting.  No diarrhea   NEUROLOGICAL: No headache or numbness, no tremors  MUSCULOSKELETAL: No joint pain, no muscle pain  GENITOURINARY: No dysuria, no frequency, no hesitancy  PSYCHIATRY: No depression, no anxiety  ALL OTHER  ROS negative        Vital Signs Last 24 Hrs  T(C): 36.6 (08 Jan 2025 12:51), Max: 37.2 (08 Jan 2025 05:34)  T(F): 97.8 (08 Jan 2025 12:51), Max: 99 (08 Jan 2025 05:34)  HR: 73 (08 Jan 2025 12:51) (67 - 88)  BP: 115/61 (08 Jan 2025 12:51) (105/63 - 149/91)  BP(mean): 89 (08 Jan 2025 00:26) (89 - 89)  RR: 18 (08 Jan 2025 12:51) (16 - 18)  SpO2: 98% (08 Jan 2025 12:51) (92% - 98%)    Parameters below as of 08 Jan 2025 12:51  Patient On (Oxygen Delivery Method): nasal cannula        ________________________________________________  PHYSICAL EXAM:  GENERAL: NAD, Ill appearing   HEENT: Normocephalic;  conjunctivae and sclerae clear; moist mucous membranes   NECK : Supple  CHEST/LUNG: Decreased BS to auscultation bilaterally with fair air entry   HEART: S1 S2 +  ABDOMEN: Soft, Nontender, Nondistended; Bowel sounds present x 4 quad  EXTREMITIES: No cyanosis; (+) 1+LE edema; no calf tenderness  SKIN: Warm and dry; no rash  NERVOUS SYSTEM:  Awake and alert;    _________________________________________________  LABS:                        12.8   9.18  )-----------( 93       ( 08 Jan 2025 06:45 )             37.6     01-08    144  |  111[H]  |  8   ----------------------------<  102[H]  2.9[LL]   |  25  |  0.56    Ca    8.2[L]      08 Jan 2025 06:45  Phos  2.9     01-08  Mg     1.6     01-08    TPro  x   /  Alb  2.2[L]  /  TBili  x   /  DBili  x   /  AST  x   /  ALT  x   /  AlkPhos  x   01-08      Urinalysis Basic - ( 08 Jan 2025 06:45 )    Color: x / Appearance: x / SG: x / pH: x  Gluc: 102 mg/dL / Ketone: x  / Bili: x / Urobili: x   Blood: x / Protein: x / Nitrite: x   Leuk Esterase: x / RBC: x / WBC x   Sq Epi: x / Non Sq Epi: x / Bacteria: x      CAPILLARY BLOOD GLUCOSE            RADIOLOGY & ADDITIONAL TESTS:    Imaging Personally Reviewed:  YES/NO    Consultant(s) Notes Reviewed:   YES/ No    Care Discussed with Consultants :     Plan of care was discussed with patient and /or primary care giver; all questions and concerns were addressed and care was aligned with patient's wishes.

## 2025-01-08 NOTE — DIETITIAN INITIAL EVALUATION ADULT - PERTINENT LABORATORY DATA
01-08    144  |  111[H]  |  8   ----------------------------<  102[H]  2.9[LL]   |  25  |  0.56    Ca    8.2[L]      08 Jan 2025 06:45  Phos  2.9     01-08  Mg     1.6     01-08    TPro  x   /  Alb  2.2[L]  /  TBili  x   /  DBili  x   /  AST  x   /  ALT  x   /  AlkPhos  x   01-08  A1C with Estimated Average Glucose Result: 6.0 % (04-17-24 @ 08:20)

## 2025-01-08 NOTE — DIETITIAN INITIAL EVALUATION ADULT - PERTINENT MEDS FT
MEDICATIONS  (STANDING):  enoxaparin Injectable 40 milliGRAM(s) SubCutaneous every 24 hours  furosemide   Injectable 40 milliGRAM(s) IV Push two times a day    MEDICATIONS  (PRN):  acetaminophen     Tablet .. 650 milliGRAM(s) Oral every 6 hours PRN Temp greater or equal to 38C (100.4F), Mild Pain (1 - 3)  aluminum hydroxide/magnesium hydroxide/simethicone Suspension 30 milliLiter(s) Oral every 4 hours PRN Dyspepsia  melatonin 3 milliGRAM(s) Oral at bedtime PRN Insomnia  ondansetron Injectable 4 milliGRAM(s) IV Push every 8 hours PRN Nausea and/or Vomiting

## 2025-01-08 NOTE — DIETITIAN INITIAL EVALUATION ADULT - PROBLEM SELECTOR PLAN 1
p/w dyspnea on exertion, palpitations on exertion and chronic LE grade 3 pitting pedal edema for the past 3 weeks.  history of CA gastric w/ mets   saturating well on RA   in the ED s/p IV lasix 40   CXR and CTangio Chest: progressive moderately sized pleural effusions, no pe  pleural effusion likely malignant in nature  starting IV lasix 40 BID   saturating well on RA however if patient sat <94% to start supplimental oxygen  Dr. Martin consulted

## 2025-01-08 NOTE — DIETITIAN INITIAL EVALUATION ADULT - ORAL INTAKE PTA/DIET HISTORY
Pt is from home, alert and orientated, verbally responsive, Georgian speaking. Language line utilized for  services Ivon #[id number]. H/o gastric cancer with mets on chemotherapy s/p J tube removal, HTN, prediabetes; admitted for pleural effusion. Pt reports good appetite PTA and in-house consuming % of meals. Unsure of any weight changes. No chewing/ swallowing issues reported. Denies any GI distress. No food preferences at this time. No known food allergies reported.

## 2025-01-08 NOTE — CONSULT NOTE ADULT - SUBJECTIVE AND OBJECTIVE BOX
PATIENT SEEN AND EXAMINED BY QUIANA CHAWLA M.D. ON :- 1/8/25  DATE OF SERVICE:   1/8/25          Interim events noted,Labs ,Radiological studies and Cardiology tests reviewed .    Patient is a 54y old  Male who presents with a chief complaint of Pleural effusion (08 Jan 2025 19:19)      HPI:  Patient is a 54M, Pmhx of CA gastric w/ mets on FLOT chemo s/p J tube removal, HTN. Patient presented with 3 weeks of shortness of breath for 3 week he is currently able to walk 1 block without feeling sob prior to the onset of symptoms he was able to walk 10 blocks. It is associated with palpitations on walking. He has progressive bilateral leg swelling. He noticed mild abdominal distension. He denies chest pain, orthopnea, pnd, lightheadedness, cough, rhinorrhea,  facial pain, n/v/d/c.     Patient's recent chemo session was 11/16 with Dr. Palma at [facility]. He finished 4 sessions so far. (07 Jan 2025 19:37)      PAST MEDICAL & SURGICAL HISTORY:  HTN (hypertension)      History of prediabetes      Hyperlipidemia      Cigarette smoker      Malignant neoplasm of body of stomach      No significant past surgical history          PREVIOUS DIAGNOSTIC TESTING:      ECHO  FINDINGS:    STRESS  FINDINGS:    CATHETERIZATION  FINDINGS:    MEDICATIONS  (STANDING):  enoxaparin Injectable 40 milliGRAM(s) SubCutaneous every 24 hours  furosemide   Injectable 40 milliGRAM(s) IV Push two times a day    MEDICATIONS  (PRN):  acetaminophen     Tablet .. 650 milliGRAM(s) Oral every 6 hours PRN Temp greater or equal to 38C (100.4F), Mild Pain (1 - 3)  aluminum hydroxide/magnesium hydroxide/simethicone Suspension 30 milliLiter(s) Oral every 4 hours PRN Dyspepsia  melatonin 3 milliGRAM(s) Oral at bedtime PRN Insomnia  ondansetron Injectable 4 milliGRAM(s) IV Push every 8 hours PRN Nausea and/or Vomiting      FAMILY HISTORY:  No pertinent family history in first degree relatives        SOCIAL HISTORY:    CIGARETTES:    ALCOHOL:    REVIEW OF SYSTEMS:  CONSTITUTIONAL: No fever, weight loss, or fatigue  EYES: No eye pain, visual disturbances, or discharge  ENMT:  No difficulty hearing, tinnitus, vertigo; No sinus or throat pain  NECK: No pain or stiffness  RESPIRATORY: No cough, wheezing, chills or hemoptysis; No shortness of breath  CARDIOVASCULAR: No chest pain, palpitations, dizziness, or leg swelling  GASTROINTESTINAL: No abdominal or epigastric pain. No nausea, vomiting, or hematemesis; No diarrhea or constipation. No melena or hematochezia.  GENITOURINARY: No dysuria, frequency, hematuria, or incontinence  NEUROLOGICAL: No headaches, memory loss, loss of strength, numbness, or tremors  SKIN: No itching, burning, rashes, or lesions   LYMPH NODES: No enlarged glands  ENDOCRINE: No heat or cold intolerance; No hair loss  MUSCULOSKELETAL: No joint pain or swelling; No muscle, back, or extremity pain  PSYCHIATRIC: No depression, anxiety, mood swings, or difficulty sleeping  HEME/LYMPH: No easy bruising, or bleeding gums  ALLERY AND IMMUNOLOGIC: No hives or eczema    Vital Signs Last 24 Hrs  T(C): 36.6 (08 Jan 2025 21:20), Max: 37.2 (08 Jan 2025 05:34)  T(F): 97.9 (08 Jan 2025 21:20), Max: 99 (08 Jan 2025 05:34)  HR: 71 (08 Jan 2025 21:20) (71 - 88)  BP: 117/61 (08 Jan 2025 21:20) (105/63 - 145/76)  BP(mean): 72 (08 Jan 2025 21:20) (72 - 89)  RR: 17 (08 Jan 2025 21:20) (17 - 18)  SpO2: 97% (08 Jan 2025 21:20) (92% - 98%)    Parameters below as of 08 Jan 2025 21:20  Patient On (Oxygen Delivery Method): nasal cannula  O2 Flow (L/min): 2        PHYSICAL EXAM:  GENERAL: NAD, well-groomed, well-developed  HEAD:  Atraumatic, Normocephalic  EYES: EOMI, PERRLA, conjunctiva and sclera clear  ENMT: No tonsillar erythema, exudates, or enlargement; Moist mucous membranes, Good dentition, No lesions  NECK: Supple, No JVD, Normal thyroid  NERVOUS SYSTEM:  Alert & Oriented X3, Good concentration; Motor Strength 5/5 B/L upper and lower extremities; DTRs 2+ intact and symmetric  CHEST/LUNG: Clear to percussion bilaterally; No rales, rhonchi, wheezing, or rubs  HEART: Regular rate and rhythm; No murmurs, rubs, or gallops  ABDOMEN: Soft, Nontender, Nondistended; Bowel sounds present  EXTREMITIES:  2+ Peripheral Pulses, No clubbing, cyanosis, or edema  LYMPH: No lymphadenopathy noted  SKIN: No rashes or lesions      INTERPRETATION OF TELEMETRY:    ECG:    LEONEL:     LABS:                        12.8   9.18  )-----------( 93       ( 08 Jan 2025 06:45 )             37.6     01-08    144  |  111[H]  |  8   ----------------------------<  102[H]  2.9[LL]   |  25  |  0.56    Ca    8.2[L]      08 Jan 2025 06:45  Phos  2.9     01-08  Mg     1.6     01-08    TPro  x   /  Alb  2.2[L]  /  TBili  x   /  DBili  x   /  AST  x   /  ALT  x   /  AlkPhos  x   01-08          Urinalysis Basic - ( 08 Jan 2025 06:45 )    Color: x / Appearance: x / SG: x / pH: x  Gluc: 102 mg/dL / Ketone: x  / Bili: x / Urobili: x   Blood: x / Protein: x / Nitrite: x   Leuk Esterase: x / RBC: x / WBC x   Sq Epi: x / Non Sq Epi: x / Bacteria: x      Lipid Panel:   I&O's Summary    08 Jan 2025 07:01  -  08 Jan 2025 23:17  --------------------------------------------------------  IN: 0 mL / OUT: 800 mL / NET: -800 mL        RADIOLOGY & ADDITIONAL STUDIES:

## 2025-01-08 NOTE — PROGRESS NOTE ADULT - SUBJECTIVE AND OBJECTIVE BOX
NP Note discussed with  primary attending    Patient is a 54y old  Male who presents with a chief complaint of Pleural effusion (07 Jan 2025 19:37)      INTERVAL HPI/OVERNIGHT EVENTS: Denies new complaints or concerns.  Reports continued SOB/JOHNSON and LE swelling.  Reports walking to the bathroom w/ JOHNSON.  Denies HA, abdominal pain, nausea or vomiting.  LBM today, normal.      MEDICATIONS  (STANDING):  enoxaparin Injectable 40 milliGRAM(s) SubCutaneous every 24 hours  furosemide   Injectable 40 milliGRAM(s) IV Push two times a day    MEDICATIONS  (PRN):  acetaminophen     Tablet .. 650 milliGRAM(s) Oral every 6 hours PRN Temp greater or equal to 38C (100.4F), Mild Pain (1 - 3)  aluminum hydroxide/magnesium hydroxide/simethicone Suspension 30 milliLiter(s) Oral every 4 hours PRN Dyspepsia  melatonin 3 milliGRAM(s) Oral at bedtime PRN Insomnia  ondansetron Injectable 4 milliGRAM(s) IV Push every 8 hours PRN Nausea and/or Vomiting      __________________________________________________  REVIEW OF SYSTEMS:    CONSTITUTIONAL: No fever  EYES: No acute visual disturbances  NECK: No pain or stiffness  RESPIRATORY: No cough; (+) SOB & JOHNSON   CARDIOVASCULAR: No chest pain, no palpitations  GASTROINTESTINAL: No pain. No nausea or vomiting.  No diarrhea   NEUROLOGICAL: No headache or numbness, no tremors  MUSCULOSKELETAL: No joint pain, no muscle pain  GENITOURINARY: No dysuria, no frequency, no hesitancy  PSYCHIATRY: No depression, no anxiety  ALL OTHER  ROS negative        Vital Signs Last 24 Hrs  T(C): 36.6 (08 Jan 2025 12:51), Max: 37.2 (08 Jan 2025 05:34)  T(F): 97.8 (08 Jan 2025 12:51), Max: 99 (08 Jan 2025 05:34)  HR: 73 (08 Jan 2025 12:51) (67 - 88)  BP: 115/61 (08 Jan 2025 12:51) (105/63 - 149/91)  BP(mean): 89 (08 Jan 2025 00:26) (89 - 89)  RR: 18 (08 Jan 2025 12:51) (16 - 18)  SpO2: 98% (08 Jan 2025 12:51) (92% - 98%)    Parameters below as of 08 Jan 2025 12:51  Patient On (Oxygen Delivery Method): nasal cannula        ________________________________________________  PHYSICAL EXAM:  GENERAL: NAD, Ill appearing   HEENT: Normocephalic;  conjunctivae and sclerae clear; moist mucous membranes   NECK : Supple  CHEST/LUNG: Decreased BS to auscultation bilaterally with fair air entry   HEART: S1 S2  regular; no murmurs, gallops or rubs  ABDOMEN: Soft, Nontender, Nondistended; Bowel sounds present x 4 quad  EXTREMITIES: No cyanosis; (+) 1+LE edema; no calf tenderness  SKIN: Warm and dry; no rash  NERVOUS SYSTEM:  Awake and alert; Oriented to place, person and time; no new deficits    _________________________________________________  LABS:                        12.8   9.18  )-----------( 93       ( 08 Jan 2025 06:45 )             37.6     01-08    144  |  111[H]  |  8   ----------------------------<  102[H]  2.9[LL]   |  25  |  0.56    Ca    8.2[L]      08 Jan 2025 06:45  Phos  2.9     01-08  Mg     1.6     01-08    TPro  x   /  Alb  2.2[L]  /  TBili  x   /  DBili  x   /  AST  x   /  ALT  x   /  AlkPhos  x   01-08      Urinalysis Basic - ( 08 Jan 2025 06:45 )    Color: x / Appearance: x / SG: x / pH: x  Gluc: 102 mg/dL / Ketone: x  / Bili: x / Urobili: x   Blood: x / Protein: x / Nitrite: x   Leuk Esterase: x / RBC: x / WBC x   Sq Epi: x / Non Sq Epi: x / Bacteria: x      CAPILLARY BLOOD GLUCOSE            RADIOLOGY & ADDITIONAL TESTS:    Imaging Personally Reviewed:  YES/NO    Consultant(s) Notes Reviewed:   YES/ No    Care Discussed with Consultants :     Plan of care was discussed with patient and /or primary care giver; all questions and concerns were addressed and care was aligned with patient's wishes.

## 2025-01-08 NOTE — CHART NOTE - NSCHARTNOTEFT_GEN_A_CORE
RN reported pt endorsed JOHNSON when going to the bathroom and was placed on oxygen.  Per report oxygen sat was 95%.    Oxygen NC ordered for pt's comfort.

## 2025-01-09 DIAGNOSIS — Z75.8 OTHER PROBLEMS RELATED TO MEDICAL FACILITIES AND OTHER HEALTH CARE: ICD-10-CM

## 2025-01-09 LAB
ALBUMIN SERPL ELPH-MCNC: 1.9 G/DL — LOW (ref 3.5–5)
ALP SERPL-CCNC: 127 U/L — HIGH (ref 40–120)
ALT FLD-CCNC: 24 U/L DA — SIGNIFICANT CHANGE UP (ref 10–60)
ANION GAP SERPL CALC-SCNC: 4 MMOL/L — LOW (ref 5–17)
AST SERPL-CCNC: 44 U/L — HIGH (ref 10–40)
BILIRUB SERPL-MCNC: 0.6 MG/DL — SIGNIFICANT CHANGE UP (ref 0.2–1.2)
BUN SERPL-MCNC: 9 MG/DL — SIGNIFICANT CHANGE UP (ref 7–18)
CALCIUM SERPL-MCNC: 8.1 MG/DL — LOW (ref 8.4–10.5)
CHLORIDE SERPL-SCNC: 111 MMOL/L — HIGH (ref 96–108)
CO2 SERPL-SCNC: 26 MMOL/L — SIGNIFICANT CHANGE UP (ref 22–31)
CREAT SERPL-MCNC: 0.48 MG/DL — LOW (ref 0.5–1.3)
EGFR: 123 ML/MIN/1.73M2 — SIGNIFICANT CHANGE UP
GLUCOSE SERPL-MCNC: 115 MG/DL — HIGH (ref 70–99)
HCT VFR BLD CALC: 35.7 % — LOW (ref 39–50)
HGB BLD-MCNC: 12 G/DL — LOW (ref 13–17)
MAGNESIUM SERPL-MCNC: 1.7 MG/DL — SIGNIFICANT CHANGE UP (ref 1.6–2.6)
MCHC RBC-ENTMCNC: 30.8 PG — SIGNIFICANT CHANGE UP (ref 27–34)
MCHC RBC-ENTMCNC: 33.6 G/DL — SIGNIFICANT CHANGE UP (ref 32–36)
MCV RBC AUTO: 91.8 FL — SIGNIFICANT CHANGE UP (ref 80–100)
NRBC # BLD: 0 /100 WBCS — SIGNIFICANT CHANGE UP (ref 0–0)
PHOSPHATE SERPL-MCNC: 2.9 MG/DL — SIGNIFICANT CHANGE UP (ref 2.5–4.5)
PLATELET # BLD AUTO: 87 K/UL — LOW (ref 150–400)
POTASSIUM SERPL-MCNC: 4.2 MMOL/L — SIGNIFICANT CHANGE UP (ref 3.5–5.3)
POTASSIUM SERPL-SCNC: 4.2 MMOL/L — SIGNIFICANT CHANGE UP (ref 3.5–5.3)
PROT SERPL-MCNC: 4.5 G/DL — LOW (ref 6–8.3)
RBC # BLD: 3.89 M/UL — LOW (ref 4.2–5.8)
RBC # FLD: 17.9 % — HIGH (ref 10.3–14.5)
SODIUM SERPL-SCNC: 141 MMOL/L — SIGNIFICANT CHANGE UP (ref 135–145)
WBC # BLD: 8.78 K/UL — SIGNIFICANT CHANGE UP (ref 3.8–10.5)
WBC # FLD AUTO: 8.78 K/UL — SIGNIFICANT CHANGE UP (ref 3.8–10.5)

## 2025-01-09 PROCEDURE — 71045 X-RAY EXAM CHEST 1 VIEW: CPT | Mod: 26

## 2025-01-09 RX ORDER — IPRATROPIUM BROMIDE AND ALBUTEROL SULFATE .5; 2.5 MG/3ML; MG/3ML
3 SOLUTION RESPIRATORY (INHALATION) EVERY 6 HOURS
Refills: 0 | Status: DISCONTINUED | OUTPATIENT
Start: 2025-01-09 | End: 2025-01-16

## 2025-01-09 RX ORDER — FLUTICASONE PROPIONATE AND SALMETEROL 50; 500 UG/1; UG/1
1 POWDER ORAL; RESPIRATORY (INHALATION)
Refills: 0 | Status: DISCONTINUED | OUTPATIENT
Start: 2025-01-09 | End: 2025-01-16

## 2025-01-09 RX ORDER — ENOXAPARIN SODIUM 60 MG/.6ML
40 INJECTION INTRAVENOUS; SUBCUTANEOUS EVERY 24 HOURS
Refills: 0 | Status: DISCONTINUED | OUTPATIENT
Start: 2025-01-09 | End: 2025-01-16

## 2025-01-09 RX ADMIN — PANTOPRAZOLE 40 MILLIGRAM(S): 40 TABLET, DELAYED RELEASE ORAL at 05:50

## 2025-01-09 RX ADMIN — FLUTICASONE PROPIONATE AND SALMETEROL 1 DOSE(S): 50; 500 POWDER ORAL; RESPIRATORY (INHALATION) at 14:31

## 2025-01-09 RX ADMIN — ENOXAPARIN SODIUM 40 MILLIGRAM(S): 60 INJECTION INTRAVENOUS; SUBCUTANEOUS at 05:50

## 2025-01-09 RX ADMIN — FLUTICASONE PROPIONATE AND SALMETEROL 1 DOSE(S): 50; 500 POWDER ORAL; RESPIRATORY (INHALATION) at 22:29

## 2025-01-09 RX ADMIN — IPRATROPIUM BROMIDE AND ALBUTEROL SULFATE 3 MILLILITER(S): .5; 2.5 SOLUTION RESPIRATORY (INHALATION) at 14:26

## 2025-01-09 RX ADMIN — IPRATROPIUM BROMIDE AND ALBUTEROL SULFATE 3 MILLILITER(S): .5; 2.5 SOLUTION RESPIRATORY (INHALATION) at 21:07

## 2025-01-09 RX ADMIN — Medication 40 MILLIGRAM(S): at 14:30

## 2025-01-09 NOTE — PROGRESS NOTE ADULT - SUBJECTIVE AND OBJECTIVE BOX
Patient is a 54y old  Male who presents with a chief complaint of Pleural effusion (08 Jan 2025     pt seen and examined      MEDICATIONS  (STANDING):  albuterol/ipratropium for Nebulization 3 milliLiter(s) Nebulizer every 6 hours  fluticasone propionate/ salmeterol 250-50 MICROgram(s) Diskus 1 Dose(s) Inhalation two times a day  furosemide   Injectable 40 milliGRAM(s) IV Push two times a day  pantoprazole    Tablet 40 milliGRAM(s) Oral before breakfast    MEDICATIONS  (PRN):  acetaminophen     Tablet .. 650 milliGRAM(s) Oral every 6 hours PRN Temp greater or equal to 38C (100.4F), Mild Pain (1 - 3)  aluminum hydroxide/magnesium hydroxide/simethicone Suspension 30 milliLiter(s) Oral every 4 hours PRN Dyspepsia  melatonin 3 milliGRAM(s) Oral at bedtime PRN Insomnia  ondansetron Injectable 4 milliGRAM(s) IV Push every 8 hours PRN Nausea and/or Vomiting      __________________________________________________  REVIEW OF SYSTEMS:   [id number]  CONSTITUTIONAL: No fever,   EYES: no acute visual disturbances  NECK: No pain or stiffness  RESPIRATORY: No cough; No shortness of breath  CARDIOVASCULAR: No chest pain, no palpitations  GASTROINTESTINAL: No pain. No nausea or vomiting; No diarrhea   NEUROLOGICAL: No headache or numbness, no tremors  MUSCULOSKELETAL: No joint pain, no muscle pain  GENITOURINARY: no dysuria, no frequency, no hesitancy    ALL OTHER  ROS negative        Vital Signs Last 24 Hrs  T(C): 36.7 (09 Jan 2025 13:40), Max: 36.7 (09 Jan 2025 13:40)  T(F): 98 (09 Jan 2025 13:40), Max: 98 (09 Jan 2025 13:40)  HR: 73 (09 Jan 2025 13:40) (71 - 76)  BP: 118/72 (09 Jan 2025 13:40) (108/68 - 118/72)  BP(mean): 83 (09 Jan 2025 13:40) (72 - 83)  RR: 17 (09 Jan 2025 13:40) (17 - 17)  SpO2: 93% (09 Jan 2025 13:40) (93% - 97%)    Parameters below as of 09 Jan 2025 13:40  Patient On (Oxygen Delivery Method): room air        ________________________________________________  PHYSICAL EXAM:  GENERAL: NAD, frail  HEENT: Normocephalic; moist mucous membranes;   NECK : supple  CHEST/LUNG: dec breath sounds at bases  HEART: S1 S2+    ABDOMEN: Soft, Nontender, + slightly distended; Bowel sounds present  EXTREMITIES: no cyanosis; + edema B/L LE; no calf tenderness  SKIN: warm and dry; no rash  NERVOUS SYSTEM:  Awake and alert;   _________________________________________________  LABS:                        12.0   8.78  )-----------( 87       ( 09 Jan 2025 06:30 )             35.7     01-09    141  |  111[H]  |  9   ----------------------------<  115[H]  4.2   |  26  |  0.48[L]    Ca    8.1[L]      09 Jan 2025 06:30  Phos  2.9     01-09  Mg     1.7     01-09    TPro  4.5[L]  /  Alb  1.9[L]  /  TBili  0.6  /  DBili  x   /  AST  44[H]  /  ALT  24  /  AlkPhos  127[H]  01-09      Urinalysis Basic - ( 09 Jan 2025 06:30 )    Color: x / Appearance: x / SG: x / pH: x  Gluc: 115 mg/dL / Ketone: x  / Bili: x / Urobili: x   Blood: x / Protein: x / Nitrite: x   Leuk Esterase: x / RBC: x / WBC x   Sq Epi: x / Non Sq Epi: x / Bacteria: x      CAPILLARY BLOOD GLUCOSE            RADIOLOGY & ADDITIONAL TESTS:  < from: CT Angio Chest PE Protocol w/ IV Cont (01.07.25 @ 13:05) >    ACC: [id number] EXAM:  CT ANGIO CHEST PULM ART [facility]   ORDERED BY:    KAM ZAMARRIPA     PROCEDURE DATE:  01/07/2025          INTERPRETATION:  CLINICAL INDICATION: Dyspnea, gastric cancer    TECHNIQUE: A volumetric acquisition of the chest was obtained from the   thoracic inlet to the upper abdomen during dynamic administration of   intravenous contrast according to the PE protocol. 3D MIP images were   provided.    CONTRAST/COMPLICATIONS:  IV Contrast: Omnipaque 350  60 cc administered   40 cc discarded  Oral Contrast: NONE  Complications: .    COMPARISON: CT abdomen 12/24/2024.      FINDINGS:  Pulmonary Artery: The study is technically adequate with a good contrast   bolus to the pulmonary arteries. There are no filling defects in the  pulmonary artery or its branches.    Aorta: Normal in course and caliber.    Cardiac: The heart is normal in size. No pericardial effusion.  Right   sided central venous access port with the tip terminating in the right   atrium.    Lungs/Airways/Pleura: Patent central airways. Moderate bilateral pleural   effusions with adjacent compressive atelectasis have increased since   December 24, 2024. Emphysema.  Scattered calcified granulomas.    Mediastinum/Lymph nodes: No thoracic adenopathy.    Upper Abdomen: Evaluation of the partially imaged upper abdomen   demonstrates ascites.    Bones and Soft Tissues: No acute osseous abnormality.      IMPRESSION:  No evidence of pulmonary embolism.    Moderate bilateral pleural effusions with adjacent compressive   atelectasis have increased since December 24, 2024.    --- End of Report ---            CAM CASTRO DO; Attending Radiologist  This document has been electronically signed. Jan 7 2025  1:29PM    < end of copied text >    Imaging Personally Reviewed:  YES/NO    Consultant(s) Notes Reviewed:   YES/ No    Care Discussed with Consultants :     Plan of care was discussed with patient and /or primary care giver; all questions and concerns were addressed and care was aligned with patient's wishes.

## 2025-01-09 NOTE — PROGRESS NOTE ADULT - SUBJECTIVE AND OBJECTIVE BOX
Time of Visit:  Patient seen and examined.     MEDICATIONS  (STANDING):  albuterol/ipratropium for Nebulization 3 milliLiter(s) Nebulizer every 6 hours  enoxaparin Injectable 40 milliGRAM(s) SubCutaneous every 24 hours  fluticasone propionate/ salmeterol 250-50 MICROgram(s) Diskus 1 Dose(s) Inhalation two times a day  furosemide   Injectable 40 milliGRAM(s) IV Push two times a day  pantoprazole    Tablet 40 milliGRAM(s) Oral before breakfast      MEDICATIONS  (PRN):  acetaminophen     Tablet .. 650 milliGRAM(s) Oral every 6 hours PRN Temp greater or equal to 38C (100.4F), Mild Pain (1 - 3)  aluminum hydroxide/magnesium hydroxide/simethicone Suspension 30 milliLiter(s) Oral every 4 hours PRN Dyspepsia  melatonin 3 milliGRAM(s) Oral at bedtime PRN Insomnia  ondansetron Injectable 4 milliGRAM(s) IV Push every 8 hours PRN Nausea and/or Vomiting       Medications up to date at time of exam.    ROS; No fever, chills, cough, congestion on exam.   PHYSICAL EXAMINATION:    Vital Signs Last 24 Hrs  T(C): 36.7 (09 Jan 2025 13:40), Max: 36.7 (09 Jan 2025 13:40)  T(F): 98 (09 Jan 2025 13:40), Max: 98 (09 Jan 2025 13:40)  HR: 73 (09 Jan 2025 13:40) (71 - 76)  BP: 118/72 (09 Jan 2025 13:40) (108/68 - 118/72)  BP(mean): 83 (09 Jan 2025 13:40) (72 - 83)  RR: 17 (09 Jan 2025 13:40) (17 - 17)  SpO2: 93% (09 Jan 2025 13:40) (93% - 97%)    Parameters below as of 09 Jan 2025 13:40  Patient On (Oxygen Delivery Method): room air       (if applicable)    General: Alert and oriented. Able to answer question with no SOB. No acute distress.     HEENT: Head is normocephalic and atraumatic. Extraocular muscles are intact. Mucous membranes are moist.     NECK: Supple, no palpable adenopathy.    LUNGS: Non labored. Decreased breath sounds bilaterally with no wheezing. No use of accessory muscle.     HEART: S1 S2 Regular rate and rhythm . No JVD.     ABDOMEN: Nontender. Mild distention . Active bowel sounds.     EXTREMITIES: Without any cyanosis, clubbing, rash, lesions .    NEUROLOGIC: Awake, alert, oriented.     SKIN: Warm, dry, good turgor.      LABS:                        12.0   8.78  )-----------( 87       ( 09 Jan 2025 06:30 )             35.7     01-09    141  |  111[H]  |  9   ----------------------------<  115[H]  4.2   |  26  |  0.48[L]    Ca    8.1[L]      09 Jan 2025 06:30  Phos  2.9     01-09  Mg     1.7     01-09    TPro  4.5[L]  /  Alb  1.9[L]  /  TBili  0.6  /  DBili  x   /  AST  44[H]  /  ALT  24  /  AlkPhos  127[H]  01-09      Urinalysis Basic - ( 09 Jan 2025 06:30 )    Color: x / Appearance: x / SG: x / pH: x  Gluc: 115 mg/dL / Ketone: x  / Bili: x / Urobili: x   Blood: x / Protein: x / Nitrite: x   Leuk Esterase: x / RBC: x / WBC x   Sq Epi: x / Non Sq Epi: x / Bacteria: x    MICROBIOLOGY: (if applicable)    RADIOLOGY & ADDITIONAL STUDIES:  EKG:   CXR: < from: Xray Chest 2 Views PA/Lat (12.20.24 @ 12:28) >    ACC: [id number] EXAM:  XR CHEST PA LAT 2V   ORDERED BY: CASSANDRA MC     PROCEDURE DATE:  12/20/2024          INTERPRETATION:  CLINICAL INDICATION: 54 years  Male with dyspnea.    COMPARISON: None    Right central venous catheter tip overlying the right atrium.    PA and lateral views of the chest demonstrate a suspected medial left   lower lobe infiltrate. The right lung is clear. There is no pleural   effusion. There is no pneumothorax.    The heart is normal in size. There is no mediastinal orhilar mass.  The   pulmonary vasculature is normal.    The osseous structures are unremarkable.    IMPRESSION:    Suspect medial left lower lobe infiltrate.    Findings discussed with  CASSANDRA MC [id number] at 12/20/2024 1:51 PM   with readback.    --- End of Report ---            CANDELARIO SOLANO MD; Attending Radiologist  This document has been electronically signed. Dec 20 2024  1:51PM    < end of copied text >       < from: CT Angio Chest PE Protocol w/ IV Cont (01.07.25 @ 13:05) >    ACC: [id number] EXAM:  CT ANGIO CHEST PULM ART [facility]   ORDERED BY:    KAM ZAMARRIPA     PROCEDURE DATE:  01/07/2025          INTERPRETATION:  CLINICAL INDICATION: Dyspnea, gastric cancer    TECHNIQUE: A volumetric acquisition of the chest was obtained from the   thoracic inlet to the upper abdomen during dynamic administration of   intravenous contrast according to the PE protocol. 3D MIP images were   provided.    CONTRAST/COMPLICATIONS:  IV Contrast: Omnipaque 350  60 cc administered   40 cc discarded  Oral Contrast: NONE  Complications: .    COMPARISON: CT abdomen 12/24/2024.      FINDINGS:  Pulmonary Artery: The study is technically adequate with a good contrast   bolus to the pulmonary arteries. There are no filling defects in the  pulmonary artery or its branches.    Aorta: Normal in course and caliber.    Cardiac: The heart is normal in size. No pericardial effusion.  Right   sided central venous access port with the tip terminating in the right   atrium.    Lungs/Airways/Pleura: Patent central airways. Moderate bilateral pleural   effusions with adjacent compressive atelectasis have increased since   December 24, 2024. Emphysema.  Scattered calcified granulomas.    Mediastinum/Lymph nodes: No thoracic adenopathy.    Upper Abdomen: Evaluation of the partially imaged upper abdomen   demonstrates ascites.    Bones and Soft Tissues: No acute osseous abnormality.      IMPRESSION:  No evidence of pulmonary embolism.    Moderate bilateral pleural effusions with adjacent compressive   atelectasis have increased since December 24, 2024.    --- End of Report ---            CAM CASTRO DO; Attending Radiologist  This document has been electronically signed. Jan 7 2025  1:29PM    < end of copied text >    ECHO:    IMPRESSION: 54y Male PAST MEDICAL & SURGICAL HISTORY:  HTN (hypertension)      History of prediabetes      Hyperlipidemia      Cigarette smoker      Malignant neoplasm of body of stomach      No significant past surgical history       Impression: This is a 55 Y/O male with  CA gastric w/ mets on FLOT chemo s/p J tube removal. Presented to ED with 3 weeks of shortness of breath. Prior to symptoms  3 weeks ago was able to walk 10 blocks . For pulmonary follow up of Acute hypoxic respiratory failure secondary to B/L pleural Effusions.     Suggestion:  O2 saturation 94% room air. Using Oxygen supplementation on and off but appears comfortable on sitting position saturating room air. Monitor O2 saturation trend room air.   Continue Duoneb via nebulization Q 6 Hours.  Continue Advair 250-50 mcg Twice daily.   No need to drain/ tap Bilateral Pleural Effusions at this time ( not enough fluid pocket to drain ).   On lasix 40 mg IVP twice daily.    DVT / GI prophylactic. On lovenox 40 mg SQ daily.

## 2025-01-09 NOTE — PROGRESS NOTE ADULT - SUBJECTIVE AND OBJECTIVE BOX
NP Note discussed with  Primary Attending    Patient is a 54y old  Male who presents with a chief complaint of Pleural effusion (08 Jan 2025 19:19)      INTERVAL HPI/OVERNIGHT EVENTS:  54M, Pmhx of CA gastric w/ mets on FLOT chemo s/p J tube removal, HTN presented with 3 weeks of shortness of breath. He is currently able to walk 1 block without feeling sob, prior to the onset of symptoms he was able to walk 10 blocks. It is associated with palpitations on walking. He has progressive bilateral leg swelling. He noticed mild abdominal distension.   Patient's recent chemo session was 12/16 with Dr. Palma at [facility]. He finished 4 sessions so far.  Patient is being admitted for management of pleural effusion likely malignant in origin.     CTA negative for PE, showed moderate bilateral pleural effusions with adjacent compressive atelectasis.     MEDICATIONS  (STANDING):  albuterol/ipratropium for Nebulization 3 milliLiter(s) Nebulizer every 6 hours  fluticasone propionate/ salmeterol 250-50 MICROgram(s) Diskus 1 Dose(s) Inhalation two times a day  furosemide   Injectable 40 milliGRAM(s) IV Push two times a day  pantoprazole    Tablet 40 milliGRAM(s) Oral before breakfast    MEDICATIONS  (PRN):  acetaminophen     Tablet .. 650 milliGRAM(s) Oral every 6 hours PRN Temp greater or equal to 38C (100.4F), Mild Pain (1 - 3)  aluminum hydroxide/magnesium hydroxide/simethicone Suspension 30 milliLiter(s) Oral every 4 hours PRN Dyspepsia  melatonin 3 milliGRAM(s) Oral at bedtime PRN Insomnia  ondansetron Injectable 4 milliGRAM(s) IV Push every 8 hours PRN Nausea and/or Vomiting      __________________________________________________  REVIEW OF SYSTEMS:    CONSTITUTIONAL: No fever,   EYES: no acute visual disturbances  NECK: No pain or stiffness  RESPIRATORY: No cough; No shortness of breath  CARDIOVASCULAR: No chest pain, no palpitations  GASTROINTESTINAL: No pain. No nausea or vomiting; No diarrhea   NEUROLOGICAL: No headache or numbness, no tremors  MUSCULOSKELETAL: No joint pain, no muscle pain  GENITOURINARY: no dysuria, no frequency, no hesitancy  PSYCHIATRY: no depression , no anxiety  ALL OTHER  ROS negative        Vital Signs Last 24 Hrs  T(C): 36.7 (09 Jan 2025 13:40), Max: 36.7 (09 Jan 2025 13:40)  T(F): 98 (09 Jan 2025 13:40), Max: 98 (09 Jan 2025 13:40)  HR: 73 (09 Jan 2025 13:40) (71 - 76)  BP: 118/72 (09 Jan 2025 13:40) (108/68 - 118/72)  BP(mean): 83 (09 Jan 2025 13:40) (72 - 83)  RR: 17 (09 Jan 2025 13:40) (17 - 17)  SpO2: 93% (09 Jan 2025 13:40) (93% - 97%)    Parameters below as of 09 Jan 2025 13:40  Patient On (Oxygen Delivery Method): room air        ________________________________________________  PHYSICAL EXAM:  GENERAL: NAD  HEENT: Normocephalic;  conjunctivae and sclerae clear; moist mucous membranes;   NECK : supple  CHEST/LUNG: Clear to auscultation bilaterally with good air entry   HEART: S1 S2  regular; no murmurs, gallops or rubs  ABDOMEN: Soft, Nontender, Nondistended; Bowel sounds present  EXTREMITIES: no cyanosis; no edema; no calf tenderness  SKIN: warm and dry; no rash  NERVOUS SYSTEM:  Awake and alert; Oriented  to place, person and time ; no new deficits    _________________________________________________  LABS:                        12.0   8.78  )-----------( 87       ( 09 Jan 2025 06:30 )             35.7     01-09    141  |  111[H]  |  9   ----------------------------<  115[H]  4.2   |  26  |  0.48[L]    Ca    8.1[L]      09 Jan 2025 06:30  Phos  2.9     01-09  Mg     1.7     01-09    TPro  4.5[L]  /  Alb  1.9[L]  /  TBili  0.6  /  DBili  x   /  AST  44[H]  /  ALT  24  /  AlkPhos  127[H]  01-09      Urinalysis Basic - ( 09 Jan 2025 06:30 )    Color: x / Appearance: x / SG: x / pH: x  Gluc: 115 mg/dL / Ketone: x  / Bili: x / Urobili: x   Blood: x / Protein: x / Nitrite: x   Leuk Esterase: x / RBC: x / WBC x   Sq Epi: x / Non Sq Epi: x / Bacteria: x      CAPILLARY BLOOD GLUCOSE            RADIOLOGY & ADDITIONAL TESTS:    Imaging Personally Reviewed:  YES/NO    Consultant(s) Notes Reviewed:   YES/ No    Care Discussed with Consultants :     Plan of care was discussed with patient and /or primary care giver; all questions and concerns were addressed and care was aligned with patient's wishes.     NP Note discussed with  Primary Attending    Patient is a 54y old  Male who presents with a chief complaint of Pleural effusion (08 Jan 2025 19:19)      INTERVAL HPI/OVERNIGHT EVENTS:  54M, Pmhx of CA gastric w/ mets on FLOT chemo s/p J tube removal, HTN presented with 3 weeks of shortness of breath. He is currently able to walk 1 block without feeling sob, prior to the onset of symptoms he was able to walk 10 blocks. It is associated with palpitations on walking. He has progressive bilateral leg swelling. He noticed mild abdominal distension.   Patient's recent chemo session was 12/16 with Dr. Palma [facility]. He finished 4 sessions so far.  Patient is being admitted for management of pleural effusion likely malignant in origin.     CTA negative for PE, showed moderate bilateral pleural effusions with adjacent compressive atelectasis. Pulm following, continue lasix, start duonebs and advair  Heme/onc consulted for gastric cancer    Pt looks comfortable this morning, no complains of pain. Discussed the plan of care    MEDICATIONS  (STANDING):  albuterol/ipratropium for Nebulization 3 milliLiter(s) Nebulizer every 6 hours  fluticasone propionate/ salmeterol 250-50 MICROgram(s) Diskus 1 Dose(s) Inhalation two times a day  furosemide   Injectable 40 milliGRAM(s) IV Push two times a day  pantoprazole    Tablet 40 milliGRAM(s) Oral before breakfast    MEDICATIONS  (PRN):  acetaminophen     Tablet .. 650 milliGRAM(s) Oral every 6 hours PRN Temp greater or equal to 38C (100.4F), Mild Pain (1 - 3)  aluminum hydroxide/magnesium hydroxide/simethicone Suspension 30 milliLiter(s) Oral every 4 hours PRN Dyspepsia  melatonin 3 milliGRAM(s) Oral at bedtime PRN Insomnia  ondansetron Injectable 4 milliGRAM(s) IV Push every 8 hours PRN Nausea and/or Vomiting      __________________________________________________  REVIEW OF SYSTEMS:   [id number]  CONSTITUTIONAL: No fever,   EYES: no acute visual disturbances  NECK: No pain or stiffness  RESPIRATORY: No cough; No shortness of breath  CARDIOVASCULAR: No chest pain, no palpitations  GASTROINTESTINAL: No pain. No nausea or vomiting; No diarrhea   NEUROLOGICAL: No headache or numbness, no tremors  MUSCULOSKELETAL: No joint pain, no muscle pain  GENITOURINARY: no dysuria, no frequency, no hesitancy    ALL OTHER  ROS negative        Vital Signs Last 24 Hrs  T(C): 36.7 (09 Jan 2025 13:40), Max: 36.7 (09 Jan 2025 13:40)  T(F): 98 (09 Jan 2025 13:40), Max: 98 (09 Jan 2025 13:40)  HR: 73 (09 Jan 2025 13:40) (71 - 76)  BP: 118/72 (09 Jan 2025 13:40) (108/68 - 118/72)  BP(mean): 83 (09 Jan 2025 13:40) (72 - 83)  RR: 17 (09 Jan 2025 13:40) (17 - 17)  SpO2: 93% (09 Jan 2025 13:40) (93% - 97%)    Parameters below as of 09 Jan 2025 13:40  Patient On (Oxygen Delivery Method): room air        ________________________________________________  PHYSICAL EXAM:  GENERAL: NAD, frail  HEENT: Normocephalic; moist mucous membranes;   NECK : supple  CHEST/LUNG: Left diminished   HEART: S1 S2  regular;  ABDOMEN: Soft, Nontender, + slightly distended; Bowel sounds present  EXTREMITIES: no cyanosis; + edema B/L LE; no calf tenderness  SKIN: warm and dry; no rash  NERVOUS SYSTEM:  Awake and alert; Oriented to place, person and time    _________________________________________________  LABS:                        12.0   8.78  )-----------( 87       ( 09 Jan 2025 06:30 )             35.7     01-09    141  |  111[H]  |  9   ----------------------------<  115[H]  4.2   |  26  |  0.48[L]    Ca    8.1[L]      09 Jan 2025 06:30  Phos  2.9     01-09  Mg     1.7     01-09    TPro  4.5[L]  /  Alb  1.9[L]  /  TBili  0.6  /  DBili  x   /  AST  44[H]  /  ALT  24  /  AlkPhos  127[H]  01-09      Urinalysis Basic - ( 09 Jan 2025 06:30 )    Color: x / Appearance: x / SG: x / pH: x  Gluc: 115 mg/dL / Ketone: x  / Bili: x / Urobili: x   Blood: x / Protein: x / Nitrite: x   Leuk Esterase: x / RBC: x / WBC x   Sq Epi: x / Non Sq Epi: x / Bacteria: x      CAPILLARY BLOOD GLUCOSE            RADIOLOGY & ADDITIONAL TESTS:  < from: CT Angio Chest PE Protocol w/ IV Cont (01.07.25 @ 13:05) >    ACC: [id number] EXAM:  CT ANGIO CHEST PULM ART [facility]   ORDERED BY:    KAM ZAMARRIPA     PROCEDURE DATE:  01/07/2025          INTERPRETATION:  CLINICAL INDICATION: Dyspnea, gastric cancer    TECHNIQUE: A volumetric acquisition of the chest was obtained from the   thoracic inlet to the upper abdomen during dynamic administration of   intravenous contrast according to the PE protocol. 3D MIP images were   provided.    CONTRAST/COMPLICATIONS:  IV Contrast: Omnipaque 350  60 cc administered   40 cc discarded  Oral Contrast: NONE  Complications: .    COMPARISON: CT abdomen 12/24/2024.      FINDINGS:  Pulmonary Artery: The study is technically adequate with a good contrast   bolus to the pulmonary arteries. There are no filling defects in the  pulmonary artery or its branches.    Aorta: Normal in course and caliber.    Cardiac: The heart is normal in size. No pericardial effusion.  Right   sided central venous access port with the tip terminating in the right   atrium.    Lungs/Airways/Pleura: Patent central airways. Moderate bilateral pleural   effusions with adjacent compressive atelectasis have increased since   December 24, 2024. Emphysema.  Scattered calcified granulomas.    Mediastinum/Lymph nodes: No thoracic adenopathy.    Upper Abdomen: Evaluation of the partially imaged upper abdomen   demonstrates ascites.    Bones and Soft Tissues: No acute osseous abnormality.      IMPRESSION:  No evidence of pulmonary embolism.    Moderate bilateral pleural effusions with adjacent compressive   atelectasis have increased since December 24, 2024.    --- End of Report ---            CAM MATTHEW DO; Attending Radiologist  This document has been electronically signed. Jan 7 2025  1:29PM    < end of copied text >    Imaging Personally Reviewed:  YES/NO    Consultant(s) Notes Reviewed:   YES/ No    Care Discussed with Consultants :     Plan of care was discussed with patient and /or primary care giver; all questions and concerns were addressed and care was aligned with patient's wishes.

## 2025-01-09 NOTE — PROGRESS NOTE ADULT - SUBJECTIVE AND OBJECTIVE BOX
PATIENT SEEN AND EXAMINED BY QUIANA CHAWLA M.D. ON :- 1/9/25  DATE OF SERVICE:  1/9/25           Interim events noted,Labs ,Radiological studies and Cardiology tests reviewed .    Patient is a 54y old  Male who presents with a chief complaint of Pleural effusion (09 Jan 2025 15:08)      HPI:  Patient is a 54M, Pmhx of CA gastric w/ mets on FLOT chemo s/p J tube removal, HTN. Patient presented with 3 weeks of shortness of breath for 3 week he is currently able to walk 1 block without feeling sob prior to the onset of symptoms he was able to walk 10 blocks. It is associated with palpitations on walking. He has progressive bilateral leg swelling. He noticed mild abdominal distension. He denies chest pain, orthopnea, pnd, lightheadedness, cough, rhinorrhea,  facial pain, n/v/d/c.     Patient's recent chemo session was 11/16 with Dr. Palma at [facility]. He finished 4 sessions so far. (07 Jan 2025 19:37)      PAST MEDICAL & SURGICAL HISTORY:  HTN (hypertension)      History of prediabetes      Hyperlipidemia      Cigarette smoker      Malignant neoplasm of body of stomach      No significant past surgical history          PREVIOUS DIAGNOSTIC TESTING:      ECHO  FINDINGS:    STRESS  FINDINGS:    CATHETERIZATION  FINDINGS:    MEDICATIONS  (STANDING):  albuterol/ipratropium for Nebulization 3 milliLiter(s) Nebulizer every 6 hours  enoxaparin Injectable 40 milliGRAM(s) SubCutaneous every 24 hours  fluticasone propionate/ salmeterol 250-50 MICROgram(s) Diskus 1 Dose(s) Inhalation two times a day  furosemide   Injectable 40 milliGRAM(s) IV Push two times a day  pantoprazole    Tablet 40 milliGRAM(s) Oral before breakfast    MEDICATIONS  (PRN):  acetaminophen     Tablet .. 650 milliGRAM(s) Oral every 6 hours PRN Temp greater or equal to 38C (100.4F), Mild Pain (1 - 3)  aluminum hydroxide/magnesium hydroxide/simethicone Suspension 30 milliLiter(s) Oral every 4 hours PRN Dyspepsia  melatonin 3 milliGRAM(s) Oral at bedtime PRN Insomnia  ondansetron Injectable 4 milliGRAM(s) IV Push every 8 hours PRN Nausea and/or Vomiting      FAMILY HISTORY:  No pertinent family history in first degree relatives        SOCIAL HISTORY:    CIGARETTES:    ALCOHOL:    REVIEW OF SYSTEMS:  CONSTITUTIONAL: No fever, weight loss, or fatigue  EYES: No eye pain, visual disturbances, or discharge  ENMT:  No difficulty hearing, tinnitus, vertigo; No sinus or throat pain  NECK: No pain or stiffness  RESPIRATORY: No cough, wheezing, chills or hemoptysis; No shortness of breath  CARDIOVASCULAR: No chest pain, palpitations, dizziness, or leg swelling  GASTROINTESTINAL: No abdominal or epigastric pain. No nausea, vomiting, or hematemesis; No diarrhea or constipation. No melena or hematochezia.  GENITOURINARY: No dysuria, frequency, hematuria, or incontinence  NEUROLOGICAL: No headaches, memory loss, loss of strength, numbness, or tremors  SKIN: No itching, burning, rashes, or lesions   LYMPH NODES: No enlarged glands  ENDOCRINE: No heat or cold intolerance; No hair loss  MUSCULOSKELETAL: No joint pain or swelling; No muscle, back, or extremity pain  PSYCHIATRIC: No depression, anxiety, mood swings, or difficulty sleeping  HEME/LYMPH: No easy bruising, or bleeding gums  ALLERY AND IMMUNOLOGIC: No hives or eczema    Vital Signs Last 24 Hrs  T(C): 37 (09 Jan 2025 20:36), Max: 37 (09 Jan 2025 20:36)  T(F): 98.6 (09 Jan 2025 20:36), Max: 98.6 (09 Jan 2025 20:36)  HR: 88 (09 Jan 2025 20:36) (73 - 88)  BP: 109/63 (09 Jan 2025 20:36) (108/68 - 118/72)  BP(mean): 83 (09 Jan 2025 13:40) (83 - 83)  RR: 17 (09 Jan 2025 20:36) (17 - 17)  SpO2: 94% (09 Jan 2025 20:36) (93% - 95%)    Parameters below as of 09 Jan 2025 20:36  Patient On (Oxygen Delivery Method): room air          PHYSICAL EXAM:  GENERAL: NAD, well-groomed, well-developed  HEAD:  Atraumatic, Normocephalic  EYES: EOMI, PERRLA, conjunctiva and sclera clear  ENMT: No tonsillar erythema, exudates, or enlargement; Moist mucous membranes, Good dentition, No lesions  NECK: Supple, No JVD, Normal thyroid  NERVOUS SYSTEM:  Alert & Oriented X3, Good concentration; Motor Strength 5/5 B/L upper and lower extremities; DTRs 2+ intact and symmetric  CHEST/LUNG: Clear to percussion bilaterally; No rales, rhonchi, wheezing, or rubs  HEART: Regular rate and rhythm; No murmurs, rubs, or gallops  ABDOMEN: Soft, Nontender, Nondistended; Bowel sounds present  EXTREMITIES:  2+ Peripheral Pulses, No clubbing, cyanosis, or edema  LYMPH: No lymphadenopathy noted  SKIN: No rashes or lesions      INTERPRETATION OF TELEMETRY:    ECG:    FRANCAVAS:     LABS:                        12.0   8.78  )-----------( 87       ( 09 Jan 2025 06:30 )             35.7     01-09    141  |  111[H]  |  9   ----------------------------<  115[H]  4.2   |  26  |  0.48[L]    Ca    8.1[L]      09 Jan 2025 06:30  Phos  2.9     01-09  Mg     1.7     01-09    TPro  4.5[L]  /  Alb  1.9[L]  /  TBili  0.6  /  DBili  x   /  AST  44[H]  /  ALT  24  /  AlkPhos  127[H]  01-09          Urinalysis Basic - ( 09 Jan 2025 06:30 )    Color: x / Appearance: x / SG: x / pH: x  Gluc: 115 mg/dL / Ketone: x  / Bili: x / Urobili: x   Blood: x / Protein: x / Nitrite: x   Leuk Esterase: x / RBC: x / WBC x   Sq Epi: x / Non Sq Epi: x / Bacteria: x      Lipid Panel:   I&O's Summary    08 Jan 2025 07:01  -  09 Jan 2025 07:00  --------------------------------------------------------  IN: 0 mL / OUT: 800 mL / NET: -800 mL        RADIOLOGY & ADDITIONAL STUDIES:

## 2025-01-10 ENCOUNTER — RESULT REVIEW (OUTPATIENT)
Age: 55
End: 2025-01-10

## 2025-01-10 LAB
ALBUMIN SERPL ELPH-MCNC: 1.9 G/DL — LOW (ref 3.5–5)
ALP SERPL-CCNC: 125 U/L — HIGH (ref 40–120)
ALT FLD-CCNC: 25 U/L DA — SIGNIFICANT CHANGE UP (ref 10–60)
ANION GAP SERPL CALC-SCNC: 9 MMOL/L — SIGNIFICANT CHANGE UP (ref 5–17)
AST SERPL-CCNC: 44 U/L — HIGH (ref 10–40)
BASOPHILS # BLD AUTO: 0.07 K/UL — SIGNIFICANT CHANGE UP (ref 0–0.2)
BASOPHILS NFR BLD AUTO: 0.7 % — SIGNIFICANT CHANGE UP (ref 0–2)
BILIRUB SERPL-MCNC: 0.6 MG/DL — SIGNIFICANT CHANGE UP (ref 0.2–1.2)
BUN SERPL-MCNC: 11 MG/DL — SIGNIFICANT CHANGE UP (ref 7–18)
CALCIUM SERPL-MCNC: 8.3 MG/DL — LOW (ref 8.4–10.5)
CHLORIDE SERPL-SCNC: 107 MMOL/L — SIGNIFICANT CHANGE UP (ref 96–108)
CO2 SERPL-SCNC: 24 MMOL/L — SIGNIFICANT CHANGE UP (ref 22–31)
CREAT SERPL-MCNC: 0.63 MG/DL — SIGNIFICANT CHANGE UP (ref 0.5–1.3)
EGFR: 114 ML/MIN/1.73M2 — SIGNIFICANT CHANGE UP
EOSINOPHIL # BLD AUTO: 0.19 K/UL — SIGNIFICANT CHANGE UP (ref 0–0.5)
EOSINOPHIL NFR BLD AUTO: 1.8 % — SIGNIFICANT CHANGE UP (ref 0–6)
GLUCOSE SERPL-MCNC: 133 MG/DL — HIGH (ref 70–99)
HCT VFR BLD CALC: 34.8 % — LOW (ref 39–50)
HGB BLD-MCNC: 11.9 G/DL — LOW (ref 13–17)
IMM GRANULOCYTES NFR BLD AUTO: 0.8 % — SIGNIFICANT CHANGE UP (ref 0–0.9)
LYMPHOCYTES # BLD AUTO: 2.34 K/UL — SIGNIFICANT CHANGE UP (ref 1–3.3)
LYMPHOCYTES # BLD AUTO: 21.8 % — SIGNIFICANT CHANGE UP (ref 13–44)
MAGNESIUM SERPL-MCNC: 1.8 MG/DL — SIGNIFICANT CHANGE UP (ref 1.6–2.6)
MCHC RBC-ENTMCNC: 31.6 PG — SIGNIFICANT CHANGE UP (ref 27–34)
MCHC RBC-ENTMCNC: 34.2 G/DL — SIGNIFICANT CHANGE UP (ref 32–36)
MCV RBC AUTO: 92.3 FL — SIGNIFICANT CHANGE UP (ref 80–100)
MONOCYTES # BLD AUTO: 1.44 K/UL — HIGH (ref 0–0.9)
MONOCYTES NFR BLD AUTO: 13.4 % — SIGNIFICANT CHANGE UP (ref 2–14)
NEUTROPHILS # BLD AUTO: 6.59 K/UL — SIGNIFICANT CHANGE UP (ref 1.8–7.4)
NEUTROPHILS NFR BLD AUTO: 61.5 % — SIGNIFICANT CHANGE UP (ref 43–77)
NRBC # BLD: 0 /100 WBCS — SIGNIFICANT CHANGE UP (ref 0–0)
PHOSPHATE SERPL-MCNC: 3.7 MG/DL — SIGNIFICANT CHANGE UP (ref 2.5–4.5)
PLATELET # BLD AUTO: 104 K/UL — LOW (ref 150–400)
POTASSIUM SERPL-MCNC: 3.4 MMOL/L — LOW (ref 3.5–5.3)
POTASSIUM SERPL-SCNC: 3.4 MMOL/L — LOW (ref 3.5–5.3)
PROT SERPL-MCNC: 4.6 G/DL — LOW (ref 6–8.3)
RBC # BLD: 3.77 M/UL — LOW (ref 4.2–5.8)
RBC # FLD: 17.8 % — HIGH (ref 10.3–14.5)
SODIUM SERPL-SCNC: 140 MMOL/L — SIGNIFICANT CHANGE UP (ref 135–145)
WBC # BLD: 10.72 K/UL — HIGH (ref 3.8–10.5)
WBC # FLD AUTO: 10.72 K/UL — HIGH (ref 3.8–10.5)

## 2025-01-10 PROCEDURE — 71045 X-RAY EXAM CHEST 1 VIEW: CPT | Mod: 26

## 2025-01-10 PROCEDURE — 93306 TTE W/DOPPLER COMPLETE: CPT | Mod: 26

## 2025-01-10 RX ORDER — POTASSIUM CHLORIDE 600 MG/1
40 TABLET, FILM COATED, EXTENDED RELEASE ORAL ONCE
Refills: 0 | Status: COMPLETED | OUTPATIENT
Start: 2025-01-10 | End: 2025-01-10

## 2025-01-10 RX ADMIN — IPRATROPIUM BROMIDE AND ALBUTEROL SULFATE 3 MILLILITER(S): .5; 2.5 SOLUTION RESPIRATORY (INHALATION) at 20:05

## 2025-01-10 RX ADMIN — ENOXAPARIN SODIUM 40 MILLIGRAM(S): 60 INJECTION INTRAVENOUS; SUBCUTANEOUS at 06:11

## 2025-01-10 RX ADMIN — IPRATROPIUM BROMIDE AND ALBUTEROL SULFATE 3 MILLILITER(S): .5; 2.5 SOLUTION RESPIRATORY (INHALATION) at 08:42

## 2025-01-10 RX ADMIN — IPRATROPIUM BROMIDE AND ALBUTEROL SULFATE 3 MILLILITER(S): .5; 2.5 SOLUTION RESPIRATORY (INHALATION) at 15:34

## 2025-01-10 RX ADMIN — PANTOPRAZOLE 40 MILLIGRAM(S): 40 TABLET, DELAYED RELEASE ORAL at 06:06

## 2025-01-10 RX ADMIN — IPRATROPIUM BROMIDE AND ALBUTEROL SULFATE 3 MILLILITER(S): .5; 2.5 SOLUTION RESPIRATORY (INHALATION) at 02:55

## 2025-01-10 RX ADMIN — FLUTICASONE PROPIONATE AND SALMETEROL 1 DOSE(S): 50; 500 POWDER ORAL; RESPIRATORY (INHALATION) at 22:26

## 2025-01-10 RX ADMIN — FLUTICASONE PROPIONATE AND SALMETEROL 1 DOSE(S): 50; 500 POWDER ORAL; RESPIRATORY (INHALATION) at 13:16

## 2025-01-10 RX ADMIN — POTASSIUM CHLORIDE 40 MILLIEQUIVALENT(S): 600 TABLET, FILM COATED, EXTENDED RELEASE ORAL at 08:29

## 2025-01-10 RX ADMIN — Medication 40 MILLIGRAM(S): at 13:16

## 2025-01-10 RX ADMIN — ONDANSETRON 4 MILLIGRAM(S): 4 TABLET ORAL at 22:27

## 2025-01-10 NOTE — PROGRESS NOTE ADULT - SUBJECTIVE AND OBJECTIVE BOX
Patient is a 54y old  Male who presents with a chief complaint of Pleural effusion (10 Zack 2025     pt seen and examined  MEDICATIONS  (STANDING):  albuterol/ipratropium for Nebulization 3 milliLiter(s) Nebulizer every 6 hours  enoxaparin Injectable 40 milliGRAM(s) SubCutaneous every 24 hours  fluticasone propionate/ salmeterol 250-50 MICROgram(s) Diskus 1 Dose(s) Inhalation two times a day  furosemide   Injectable 40 milliGRAM(s) IV Push two times a day  pantoprazole    Tablet 40 milliGRAM(s) Oral before breakfast    MEDICATIONS  (PRN):  acetaminophen     Tablet .. 650 milliGRAM(s) Oral every 6 hours PRN Temp greater or equal to 38C (100.4F), Mild Pain (1 - 3)  aluminum hydroxide/magnesium hydroxide/simethicone Suspension 30 milliLiter(s) Oral every 4 hours PRN Dyspepsia  melatonin 3 milliGRAM(s) Oral at bedtime PRN Insomnia  ondansetron Injectable 4 milliGRAM(s) IV Push every 8 hours PRN Nausea and/or Vomiting      __________________________________________________  REVIEW OF SYSTEMS:   [id number]  CONSTITUTIONAL: No fever,   EYES: no acute visual disturbances  NECK: No pain or stiffness  RESPIRATORY: No cough; intermittent shortness of breath  CARDIOVASCULAR: No chest pain, no palpitations  GASTROINTESTINAL: No pain. No nausea or vomiting; No diarrhea   NEUROLOGICAL: No headache or numbness, no tremors  MUSCULOSKELETAL: No joint pain, no muscle pain  GENITOURINARY: no dysuria, no frequency, no hesitancy  ALL OTHER  ROS negative        Vital Signs Last 24 Hrs  T(C): 36.6 (10 Zack 2025 10:32), Max: 37 (09 Jan 2025 20:36)  T(F): 97.9 (10 Zack 2025 10:32), Max: 98.6 (09 Jan 2025 20:36)  HR: 95 (10 Zack 2025 13:52) (74 - 98)  BP: 135/88 (10 Zack 2025 13:52) (103/61 - 145/64)  BP(mean): 82 (10 Zack 2025 10:32) (82 - 82)  RR: 18 (10 Zack 2025 10:32) (17 - 18)  SpO2: 95% (10 Zack 2025 13:52) (92% - 98%)    Parameters below as of 10 Zack 2025 13:52  Patient On (Oxygen Delivery Method): nasal cannula  O2 Flow (L/min): 2      ________________________________________________  PHYSICAL EXAM:  GENERAL: NAD  HEENT: Normocephalic; moist mucous membranes;   NECK : supple  CHEST/LUNG:dec breath sounds at abses  HEART: S1 S2  +  ABDOMEN: Soft, Nontender, distended; Bowel sounds present  EXTREMITIES: no cyanosis; + edema; no calf tenderness  SKIN: warm and dry; no rash  NERVOUS SYSTEM:  Awake and alert; _________________________________________________  LABS:                        11.9   10.72 )-----------( 104      ( 10 Zack 2025 06:21 )             34.8     01-10    140  |  107  |  11  ----------------------------<  133[H]  3.4[L]   |  24  |  0.63    Ca    8.3[L]      10 Zack 2025 06:21  Phos  3.7     01-10  Mg     1.8     01-10    TPro  4.6[L]  /  Alb  1.9[L]  /  TBili  0.6  /  DBili  x   /  AST  44[H]  /  ALT  25  /  AlkPhos  125[H]  01-10      Urinalysis Basic - ( 10 Zack 2025 06:21 )    Color: x / Appearance: x / SG: x / pH: x  Gluc: 133 mg/dL / Ketone: x  / Bili: x / Urobili: x   Blood: x / Protein: x / Nitrite: x   Leuk Esterase: x / RBC: x / WBC x   Sq Epi: x / Non Sq Epi: x / Bacteria: x      CAPILLARY BLOOD GLUCOSE            RADIOLOGY & ADDITIONAL TESTS:  < from: Xray Chest 1 View- PORTABLE-Routine (Xray Chest 1 View- PORTABLE-Routine in AM.) (01.10.25 @ 09:31) >    ACC: [id number] EXAM:  XR CHEST PORTABLE ROUTINE 1V   ORDERED BY: MA JANAEGABRIEL BROWNE     ACC: [id number] EXAM:  XR CHEST PORTABLE ROUTINE 1V   ORDERED BY:  CLIVE KAHN     PROCEDURE DATE:  01/09/2025          INTERPRETATION:  TIME OF EXAM: January 9, 2025 at 9:00 AM.    CLINICAL INFORMATION: Bilateral pleural effusion.    COMPARISON:  CTA of the chest from January 7, 2025.    TECHNIQUE:   AP Portable chest x-ray.    INTERPRETATION:    Heart size and the mediastinum cannot be accurately evaluated on this   projection. Calcified thoracic aorta.  There is a CT injectable right IJ approach port with tip at the right   atrial/IVC junction.  Calcified right upper lobe granuloma.  There is left midlung linear atelectasis versus extension of pleural   fluid into the left major fissure.  There are bilateral small pleural effusions, larger on the left, with   likely associated passive atelectasis. The right-sided effusion extends   into the base of the right major fissure.  No pneumothorax is seen.  There is no acute bony abnormality.    AP portable chest x-ray from January 10, 2025 at 9:06 AM:    CLINICAL INFORMATION: Pleural effusion.    INTERPRETATION:    There is a small right pleural effusion. Previous extension into the base   of themajor fissure is no longer apparent.  Decreased left pleural effusion with residual left lower/retrocardiac   opacity seen. Continued left midlung linear atelectasis versus pleural   fluid extending into the left major fissure.  No pneumothorax.      IMPRESSION:  Small right pleural effusion on the most recent image.   Extension into the base of the right major fissure is no longer seen.    Decreased left pleural effusion with residual left lower/retrocardiac   opacity on that image. The residualopacities may correspond to loculated   left pleural fluid with associated passive atelectasis. Atelectasis of   other cause and/or other pathology including, but not limited to,   pneumonia is not excluded.    Continued left midlung linear atelectasis versus pleural fluid extending   into the left major fissure.    --- End of Report ---            ABBI SALVADOR MD; Attending Radiologist  This document has been electronically signed. Zack 10 2025 12:15PM    < end of copied text >    Imaging Personally Reviewed:  YES/NO    Consultant(s) Notes Reviewed:   YES/ No    Care Discussed with Consultants :     Plan of care was discussed with patient and /or primary care giver; all questions and concerns were addressed and care was aligned with patient's wishes.

## 2025-01-10 NOTE — PROGRESS NOTE ADULT - SUBJECTIVE AND OBJECTIVE BOX
PATIENT SEEN AND EXAMINED BY QUIANA CHALWA M.D. ON :- 1/10/25  DATE OF SERVICE:   1/10/25          Interim events noted,Labs ,Radiological studies and Cardiology tests reviewed .    Patient is a 54y old  Male who presents with a chief complaint of Pleural effusion (10 Zack 2025 16:02)      HPI:  Patient is a 54M, Pmhx of CA gastric w/ mets on FLOT chemo s/p J tube removal, HTN. Patient presented with 3 weeks of shortness of breath for 3 week he is currently able to walk 1 block without feeling sob prior to the onset of symptoms he was able to walk 10 blocks. It is associated with palpitations on walking. He has progressive bilateral leg swelling. He noticed mild abdominal distension. He denies chest pain, orthopnea, pnd, lightheadedness, cough, rhinorrhea,  facial pain, n/v/d/c.     Patient's recent chemo session was 11/16 with Dr. Palma at [facility]. He finished 4 sessions so far. (07 Jan 2025 19:37)      PAST MEDICAL & SURGICAL HISTORY:  HTN (hypertension)      History of prediabetes      Hyperlipidemia      Cigarette smoker      Malignant neoplasm of body of stomach      No significant past surgical history          PREVIOUS DIAGNOSTIC TESTING:      ECHO  FINDINGS:    STRESS  FINDINGS:    CATHETERIZATION  FINDINGS:    MEDICATIONS  (STANDING):  albuterol/ipratropium for Nebulization 3 milliLiter(s) Nebulizer every 6 hours  enoxaparin Injectable 40 milliGRAM(s) SubCutaneous every 24 hours  fluticasone propionate/ salmeterol 250-50 MICROgram(s) Diskus 1 Dose(s) Inhalation two times a day  furosemide   Injectable 40 milliGRAM(s) IV Push two times a day  pantoprazole    Tablet 40 milliGRAM(s) Oral before breakfast    MEDICATIONS  (PRN):  acetaminophen     Tablet .. 650 milliGRAM(s) Oral every 6 hours PRN Temp greater or equal to 38C (100.4F), Mild Pain (1 - 3)  aluminum hydroxide/magnesium hydroxide/simethicone Suspension 30 milliLiter(s) Oral every 4 hours PRN Dyspepsia  melatonin 3 milliGRAM(s) Oral at bedtime PRN Insomnia  ondansetron Injectable 4 milliGRAM(s) IV Push every 8 hours PRN Nausea and/or Vomiting      FAMILY HISTORY:  No pertinent family history in first degree relatives        SOCIAL HISTORY:    CIGARETTES:    ALCOHOL:    REVIEW OF SYSTEMS:  CONSTITUTIONAL: No fever, weight loss, or fatigue  EYES: No eye pain, visual disturbances, or discharge  ENMT:  No difficulty hearing, tinnitus, vertigo; No sinus or throat pain  NECK: No pain or stiffness  RESPIRATORY: No cough, wheezing, chills or hemoptysis; No shortness of breath  CARDIOVASCULAR: No chest pain, palpitations, dizziness, or leg swelling  GASTROINTESTINAL: No abdominal or epigastric pain. No nausea, vomiting, or hematemesis; No diarrhea or constipation. No melena or hematochezia.  GENITOURINARY: No dysuria, frequency, hematuria, or incontinence  NEUROLOGICAL: No headaches, memory loss, loss of strength, numbness, or tremors  SKIN: No itching, burning, rashes, or lesions   LYMPH NODES: No enlarged glands  ENDOCRINE: No heat or cold intolerance; No hair loss  MUSCULOSKELETAL: No joint pain or swelling; No muscle, back, or extremity pain  PSYCHIATRIC: No depression, anxiety, mood swings, or difficulty sleeping  HEME/LYMPH: No easy bruising, or bleeding gums  ALLERY AND IMMUNOLOGIC: No hives or eczema    Vital Signs Last 24 Hrs  T(C): 36.6 (10 Zack 2025 10:32), Max: 37 (10 Zack 2025 05:00)  T(F): 97.9 (10 Zack 2025 10:32), Max: 98.6 (10 Zack 2025 05:00)  HR: 95 (10 Zack 2025 13:52) (74 - 98)  BP: 135/88 (10 Azck 2025 13:52) (103/61 - 145/64)  BP(mean): 82 (10 Zack 2025 10:32) (82 - 82)  RR: 18 (10 Zack 2025 10:32) (18 - 18)  SpO2: 95% (10 Zack 2025 13:52) (92% - 98%)    Parameters below as of 10 Zack 2025 13:52  Patient On (Oxygen Delivery Method): nasal cannula  O2 Flow (L/min): 2        PHYSICAL EXAM:  GENERAL: NAD, well-groomed, well-developed  HEAD:  Atraumatic, Normocephalic  EYES: EOMI, PERRLA, conjunctiva and sclera clear  ENMT: No tonsillar erythema, exudates, or enlargement; Moist mucous membranes, Good dentition, No lesions  NECK: Supple, No JVD, Normal thyroid  NERVOUS SYSTEM:  Alert & Oriented X3, Good concentration; Motor Strength 5/5 B/L upper and lower extremities; DTRs 2+ intact and symmetric  CHEST/LUNG: Clear to percussion bilaterally; No rales, rhonchi, wheezing, or rubs  HEART: Regular rate and rhythm; No murmurs, rubs, or gallops  ABDOMEN: Soft, Nontender, Nondistended; Bowel sounds present  EXTREMITIES:  2+ Peripheral Pulses, No clubbing, cyanosis, or edema  LYMPH: No lymphadenopathy noted  SKIN: No rashes or lesions      INTERPRETATION OF TELEMETRY:    [facility]    [facility]     LABS:                        11.9   10.72 )-----------( 104      ( 10 Zack 2025 06:21 )             34.8     01-10    140  |  107  |  11  ----------------------------<  133[H]  3.4[L]   |  24  |  0.63    Ca    8.3[L]      10 Zack 2025 06:21  Phos  3.7     01-10  Mg     1.8     01-10    TPro  4.6[L]  /  Alb  1.9[L]  /  TBili  0.6  /  DBili  x   /  AST  44[H]  /  ALT  25  /  AlkPhos  125[H]  01-10          Urinalysis Basic - ( 10 Zack 2025 06:21 )    Color: x / Appearance: x / SG: x / pH: x  Gluc: 133 mg/dL / Ketone: x  / Bili: x / Urobili: x   Blood: x / Protein: x / Nitrite: x   Leuk Esterase: x / RBC: x / WBC x   Sq Epi: x / Non Sq Epi: x / Bacteria: x      Lipid Panel:   I&O's Summary      RADIOLOGY & ADDITIONAL STUDIES:

## 2025-01-10 NOTE — PROGRESS NOTE ADULT - SUBJECTIVE AND OBJECTIVE BOX
NP Note discussed with  Primary Attending    Patient is a 54y old  Male who presents with a chief complaint of Pleural effusion (10 Zack 2025 08:52)      INTERVAL HPI/OVERNIGHT EVENTS:  54M, Pmhx of CA gastric w/ mets on FLOT chemo s/p J tube removal, HTN presented with 3 weeks of shortness of breath. He is currently able to walk 1 block without feeling sob, prior to the onset of symptoms he was able to walk 10 blocks. It is associated with palpitations on walking. He has progressive bilateral leg swelling. He noticed mild abdominal distension.   Patient's recent chemo session was 12/16 with Dr. Palma at [facility]. He finished 4 sessions so far.  Patient is being admitted for management of pleural effusion likely malignant in origin.     CTA negative for PE, showed moderate bilateral pleural effusions with adjacent compressive atelectasis. Pulm following, not enough fluid pocket to drain, continue Lasix, start Duoneb and Advair  Heme/onc consulted for gastric cancer, there are further plans for chemotherapy in the future. Called surgery for possible surgery. As per surgery, will discuss with Dr David. If not surgically candidate, will consult palliative    Pt looks comfortable this morning, no complains of pain. Discussed the plan of care    MEDICATIONS  (STANDING):  albuterol/ipratropium for Nebulization 3 milliLiter(s) Nebulizer every 6 hours  enoxaparin Injectable 40 milliGRAM(s) SubCutaneous every 24 hours  fluticasone propionate/ salmeterol 250-50 MICROgram(s) Diskus 1 Dose(s) Inhalation two times a day  furosemide   Injectable 40 milliGRAM(s) IV Push two times a day  pantoprazole    Tablet 40 milliGRAM(s) Oral before breakfast    MEDICATIONS  (PRN):  acetaminophen     Tablet .. 650 milliGRAM(s) Oral every 6 hours PRN Temp greater or equal to 38C (100.4F), Mild Pain (1 - 3)  aluminum hydroxide/magnesium hydroxide/simethicone Suspension 30 milliLiter(s) Oral every 4 hours PRN Dyspepsia  melatonin 3 milliGRAM(s) Oral at bedtime PRN Insomnia  ondansetron Injectable 4 milliGRAM(s) IV Push every 8 hours PRN Nausea and/or Vomiting      __________________________________________________  REVIEW OF SYSTEMS:   [id number]  CONSTITUTIONAL: No fever,   EYES: no acute visual disturbances  NECK: No pain or stiffness  RESPIRATORY: No cough; intermittent shortness of breath  CARDIOVASCULAR: No chest pain, no palpitations  GASTROINTESTINAL: No pain. No nausea or vomiting; No diarrhea   NEUROLOGICAL: No headache or numbness, no tremors  MUSCULOSKELETAL: No joint pain, no muscle pain  GENITOURINARY: no dysuria, no frequency, no hesitancy  ALL OTHER  ROS negative        Vital Signs Last 24 Hrs  T(C): 36.6 (10 Zack 2025 10:32), Max: 37 (09 Jan 2025 20:36)  T(F): 97.9 (10 Zack 2025 10:32), Max: 98.6 (09 Jan 2025 20:36)  HR: 95 (10 Zack 2025 13:52) (74 - 98)  BP: 135/88 (10 Zack 2025 13:52) (103/61 - 145/64)  BP(mean): 82 (10 Zack 2025 10:32) (82 - 82)  RR: 18 (10 Zack 2025 10:32) (17 - 18)  SpO2: 95% (10 Zack 2025 13:52) (92% - 98%)    Parameters below as of 10 Zack 2025 13:52  Patient On (Oxygen Delivery Method): nasal cannula  O2 Flow (L/min): 2      ________________________________________________  PHYSICAL EXAM:  GENERAL: NAD  HEENT: Normocephalic; moist mucous membranes;   NECK : supple  CHEST/LUNG: B/L LL diminished   HEART: S1 S2  regular;  ABDOMEN: Soft, Nontender, distended; Bowel sounds present  EXTREMITIES: no cyanosis; + edema; no calf tenderness  SKIN: warm and dry; no rash  NERVOUS SYSTEM:  Awake and alert; Oriented to place, person and time    _________________________________________________  LABS:                        11.9   10.72 )-----------( 104      ( 10 Zack 2025 06:21 )             34.8     01-10    140  |  107  |  11  ----------------------------<  133[H]  3.4[L]   |  24  |  0.63    Ca    8.3[L]      10 Zack 2025 06:21  Phos  3.7     01-10  Mg     1.8     01-10    TPro  4.6[L]  /  Alb  1.9[L]  /  TBili  0.6  /  DBili  x   /  AST  44[H]  /  ALT  25  /  AlkPhos  125[H]  01-10      Urinalysis Basic - ( 10 Zack 2025 06:21 )    Color: x / Appearance: x / SG: x / pH: x  Gluc: 133 mg/dL / Ketone: x  / Bili: x / Urobili: x   Blood: x / Protein: x / Nitrite: x   Leuk Esterase: x / RBC: x / WBC x   Sq Epi: x / Non Sq Epi: x / Bacteria: x      CAPILLARY BLOOD GLUCOSE            RADIOLOGY & ADDITIONAL TESTS:  < from: Xray Chest 1 View- PORTABLE-Routine (Xray Chest 1 View- PORTABLE-Routine in AM.) (01.10.25 @ 09:31) >    ACC: [id number] EXAM:  XR CHEST PORTABLE ROUTINE 1V   ORDERED BY: SALBADOR BROWNE     ACC: [id number] EXAM:  XR CHEST PORTABLE ROUTINE 1V   ORDERED BY:  CLIVE KAHN     PROCEDURE DATE:  01/09/2025          INTERPRETATION:  TIME OF EXAM: January 9, 2025 at 9:00 AM.    CLINICAL INFORMATION: Bilateral pleural effusion.    COMPARISON:  CTA of the chest from January 7, 2025.    TECHNIQUE:   AP Portable chest x-ray.    INTERPRETATION:    Heart size and the mediastinum cannot be accurately evaluated on this   projection. Calcified thoracic aorta.  There is a CT injectable right IJ approach port with tip at the right   atrial/IVC junction.  Calcified right upper lobe granuloma.  There is left midlung linear atelectasis versus extension of pleural   fluid into the left major fissure.  There are bilateral small pleural effusions, larger on the left, with   likely associated passive atelectasis. The right-sided effusion extends   into the base of the right major fissure.  No pneumothorax is seen.  There is no acute bony abnormality.    AP portable chest x-ray from January 10, 2025 at 9:06 AM:    CLINICAL INFORMATION: Pleural effusion.    INTERPRETATION:    There is a small right pleural effusion. Previous extension into the base   of themajor fissure is no longer apparent.  Decreased left pleural effusion with residual left lower/retrocardiac   opacity seen. Continued left midlung linear atelectasis versus pleural   fluid extending into the left major fissure.  No pneumothorax.      IMPRESSION:  Small right pleural effusion on the most recent image.   Extension into the base of the right major fissure is no longer seen.    Decreased left pleural effusion with residual left lower/retrocardiac   opacity on that image. The residualopacities may correspond to loculated   left pleural fluid with associated passive atelectasis. Atelectasis of   other cause and/or other pathology including, but not limited to,   pneumonia is not excluded.    Continued left midlung linear atelectasis versus pleural fluid extending   into the left major fissure.    --- End of Report ---            ABBI SALVADOR MD; Attending Radiologist  This document has been electronically signed. Zack 10 2025 12:15PM    < end of copied text >    Imaging Personally Reviewed:  YES/NO    Consultant(s) Notes Reviewed:   YES/ No    Care Discussed with Consultants :     Plan of care was discussed with patient and /or primary care giver; all questions and concerns were addressed and care was aligned with patient's wishes.

## 2025-01-10 NOTE — CONSULT NOTE ADULT - SUBJECTIVE AND OBJECTIVE BOX
Patient is a 54y old  Male who presents with a chief complaint of Pleural effusion (09 Jan 2025 15:08)      HPI:  Patient is a 54M, Pmhx of CA gastric w/ mets on FOLFOX then  FLOT chemo s/p J tube removal, HTN. Patient presented with 3 weeks of shortness of breath for 3 week he is currently able to walk 1 block without feeling sob prior to the onset of symptoms he was able to walk 10 blocks. It is associated with palpitations on walking. He has progressive bilateral leg swelling. He noticed mild abdominal distension. He denies chest pain, orthopnea, pnd, lightheadedness, cough, rhinorrhea,  facial pain, n/v/d/c.  < from: CT Angio Chest PE Protocol w/ IV Cont   No evidence of pulmonary embolism. Moderate bilateral pleural effusions with adjacent compressive atelectasis have increased since December 24, 2024.    <>      Patient's recent chemo session was 11/16 with Dr. Palma at [facility]. He finished 4 sessions of FLOT so far. (07 Jan 2025 19:37)       ROS:  Negative except for:    PAST MEDICAL & SURGICAL HISTORY:  HTN (hypertension)      History of prediabetes      Hyperlipidemia      Cigarette smoker      Malignant neoplasm of body of stomach      No significant past surgical history          SOCIAL HISTORY:    FAMILY HISTORY:  No pertinent family history in first degree relatives        MEDICATIONS  (STANDING):  albuterol/ipratropium for Nebulization 3 milliLiter(s) Nebulizer every 6 hours  enoxaparin Injectable 40 milliGRAM(s) SubCutaneous every 24 hours  fluticasone propionate/ salmeterol 250-50 MICROgram(s) Diskus 1 Dose(s) Inhalation two times a day  furosemide   Injectable 40 milliGRAM(s) IV Push two times a day  pantoprazole    Tablet 40 milliGRAM(s) Oral before breakfast    MEDICATIONS  (PRN):  acetaminophen     Tablet .. 650 milliGRAM(s) Oral every 6 hours PRN Temp greater or equal to 38C (100.4F), Mild Pain (1 - 3)  aluminum hydroxide/magnesium hydroxide/simethicone Suspension 30 milliLiter(s) Oral every 4 hours PRN Dyspepsia  melatonin 3 milliGRAM(s) Oral at bedtime PRN Insomnia  ondansetron Injectable 4 milliGRAM(s) IV Push every 8 hours PRN Nausea and/or Vomiting      Allergies    No Known Allergies    Intolerances        Vital Signs Last 24 Hrs  T(C): 37 (10 Zack 2025 05:00), Max: 37 (09 Jan 2025 20:36)  T(F): 98.6 (10 Zack 2025 05:00), Max: 98.6 (09 Jan 2025 20:36)  HR: 98 (10 Zack 2025 05:00) (73 - 98)  BP: 103/61 (10 Zack 2025 05:00) (103/61 - 118/72)  BP(mean): 83 (09 Jan 2025 13:40) (83 - 83)  RR: 18 (10 Zack 2025 05:00) (17 - 18)  SpO2: 92% (10 Zack 2025 05:00) (92% - 94%)    Parameters below as of 10 Zack 2025 05:00  Patient On (Oxygen Delivery Method): room air        PHYSICAL EXAM  General: adult in NAD  HEENT: clear oropharynx, anicteric sclera, pink conjunctiva  Neck: supple  CV: normal S1/S2 with no murmur rubs or gallops  Lungs: positive air movement b/l ant lungs,clear to auscultation, no wheezes, no rales  Abdomen: soft non-tender non-distended, no hepatosplenomegaly  Ext: no clubbing cyanosis or edema  Skin: no rashes and no petechiae  Neuro: alert and oriented X 4, no focal deficits      LABS:                          11.9   10.72 )-----------( 104      ( 10 Zack 2025 06:21 )             34.8         Mean Cell Volume : 92.3 fl  Mean Cell Hemoglobin : 31.6 pg  Mean Cell Hemoglobin Concentration : 34.2 g/dL  Auto Neutrophil # : 6.59 K/uL  Auto Lymphocyte # : 2.34 K/uL  Auto Monocyte # : 1.44 K/uL  Auto Eosinophil # : 0.19 K/uL  Auto Basophil # : 0.07 K/uL  Auto Neutrophil % : 61.5 %  Auto Lymphocyte % : 21.8 %  Auto Monocyte % : 13.4 %  Auto Eosinophil % : 1.8 %  Auto Basophil % : 0.7 %      Serial CBC's  01-10 @ 06:21  Hct-34.8 / Hgb-11.9 / Plat-104 / RBC-3.77 / WBC-10.72  Serial CBC's  01-09 @ 06:30  Hct-35.7 / Hgb-12.0 / Plat-87 / RBC-3.89 / WBC-8.78  Serial CBC's  01-08 @ 06:45  Hct-37.6 / Hgb-12.8 / Plat-93 / RBC-4.10 / WBC-9.18  Serial CBC's  01-07 @ 11:20  Hct-38.6 / Hgb-13.0 / Plat-83 / RBC-4.21 / WBC-10.72      01-10    140  |  107  |  11  ----------------------------<  133[H]  3.4[L]   |  24  |  0.63    Ca    8.3[L]      10 Zack 2025 06:21  Phos  3.7     01-10  Mg     1.8     01-10    TPro  4.6[L]  /  Alb  1.9[L]  /  TBili  0.6  /  DBili  x   /  AST  44[H]  /  ALT  25  /  AlkPhos  125[H]  01-10                      BLOOD SMEAR INTERPRETATION:       RADIOLOGY & ADDITIONAL STUDIES:

## 2025-01-10 NOTE — CHART NOTE - NSCHARTNOTEFT_GEN_A_CORE
Nutrition Consult:    Assessment Requested  Nutrition Assessment was completed on 1/8/25. See document section of the chart for details. Nutrition Consult:    Assessment/Education Requested  Nutrition Assessment was completed on 1/8/25. See document section of the chart for details.

## 2025-01-11 LAB
ALBUMIN SERPL ELPH-MCNC: 2 G/DL — LOW (ref 3.5–5)
ALP SERPL-CCNC: 118 U/L — SIGNIFICANT CHANGE UP (ref 40–120)
ALT FLD-CCNC: 30 U/L DA — SIGNIFICANT CHANGE UP (ref 10–60)
ANION GAP SERPL CALC-SCNC: 6 MMOL/L — SIGNIFICANT CHANGE UP (ref 5–17)
AST SERPL-CCNC: 49 U/L — HIGH (ref 10–40)
BASOPHILS # BLD AUTO: 0.06 K/UL — SIGNIFICANT CHANGE UP (ref 0–0.2)
BASOPHILS NFR BLD AUTO: 0.5 % — SIGNIFICANT CHANGE UP (ref 0–2)
BILIRUB SERPL-MCNC: 0.7 MG/DL — SIGNIFICANT CHANGE UP (ref 0.2–1.2)
BUN SERPL-MCNC: 12 MG/DL — SIGNIFICANT CHANGE UP (ref 7–18)
CALCIUM SERPL-MCNC: 8.1 MG/DL — LOW (ref 8.4–10.5)
CHLORIDE SERPL-SCNC: 107 MMOL/L — SIGNIFICANT CHANGE UP (ref 96–108)
CO2 SERPL-SCNC: 27 MMOL/L — SIGNIFICANT CHANGE UP (ref 22–31)
CREAT SERPL-MCNC: 0.54 MG/DL — SIGNIFICANT CHANGE UP (ref 0.5–1.3)
EGFR: 119 ML/MIN/1.73M2 — SIGNIFICANT CHANGE UP
EOSINOPHIL # BLD AUTO: 0.33 K/UL — SIGNIFICANT CHANGE UP (ref 0–0.5)
EOSINOPHIL NFR BLD AUTO: 2.9 % — SIGNIFICANT CHANGE UP (ref 0–6)
GLUCOSE SERPL-MCNC: 119 MG/DL — HIGH (ref 70–99)
HCT VFR BLD CALC: 33.1 % — LOW (ref 39–50)
HGB BLD-MCNC: 11.3 G/DL — LOW (ref 13–17)
IMM GRANULOCYTES NFR BLD AUTO: 0.6 % — SIGNIFICANT CHANGE UP (ref 0–0.9)
LYMPHOCYTES # BLD AUTO: 19.5 % — SIGNIFICANT CHANGE UP (ref 13–44)
LYMPHOCYTES # BLD AUTO: 2.19 K/UL — SIGNIFICANT CHANGE UP (ref 1–3.3)
MAGNESIUM SERPL-MCNC: 1.8 MG/DL — SIGNIFICANT CHANGE UP (ref 1.6–2.6)
MCHC RBC-ENTMCNC: 31.5 PG — SIGNIFICANT CHANGE UP (ref 27–34)
MCHC RBC-ENTMCNC: 34.1 G/DL — SIGNIFICANT CHANGE UP (ref 32–36)
MCV RBC AUTO: 92.2 FL — SIGNIFICANT CHANGE UP (ref 80–100)
MONOCYTES # BLD AUTO: 1.5 K/UL — HIGH (ref 0–0.9)
MONOCYTES NFR BLD AUTO: 13.3 % — SIGNIFICANT CHANGE UP (ref 2–14)
NEUTROPHILS # BLD AUTO: 7.1 K/UL — SIGNIFICANT CHANGE UP (ref 1.8–7.4)
NEUTROPHILS NFR BLD AUTO: 63.2 % — SIGNIFICANT CHANGE UP (ref 43–77)
NRBC # BLD: 0 /100 WBCS — SIGNIFICANT CHANGE UP (ref 0–0)
PHOSPHATE SERPL-MCNC: 3.2 MG/DL — SIGNIFICANT CHANGE UP (ref 2.5–4.5)
PLATELET # BLD AUTO: 108 K/UL — LOW (ref 150–400)
POTASSIUM SERPL-MCNC: 3.8 MMOL/L — SIGNIFICANT CHANGE UP (ref 3.5–5.3)
POTASSIUM SERPL-SCNC: 3.8 MMOL/L — SIGNIFICANT CHANGE UP (ref 3.5–5.3)
PROT SERPL-MCNC: 4.6 G/DL — LOW (ref 6–8.3)
RBC # BLD: 3.59 M/UL — LOW (ref 4.2–5.8)
RBC # FLD: 17.7 % — HIGH (ref 10.3–14.5)
SODIUM SERPL-SCNC: 140 MMOL/L — SIGNIFICANT CHANGE UP (ref 135–145)
WBC # BLD: 11.25 K/UL — HIGH (ref 3.8–10.5)
WBC # FLD AUTO: 11.25 K/UL — HIGH (ref 3.8–10.5)

## 2025-01-11 RX ADMIN — IPRATROPIUM BROMIDE AND ALBUTEROL SULFATE 3 MILLILITER(S): .5; 2.5 SOLUTION RESPIRATORY (INHALATION) at 15:50

## 2025-01-11 RX ADMIN — FLUTICASONE PROPIONATE AND SALMETEROL 1 DOSE(S): 50; 500 POWDER ORAL; RESPIRATORY (INHALATION) at 13:14

## 2025-01-11 RX ADMIN — IPRATROPIUM BROMIDE AND ALBUTEROL SULFATE 3 MILLILITER(S): .5; 2.5 SOLUTION RESPIRATORY (INHALATION) at 09:28

## 2025-01-11 RX ADMIN — FLUTICASONE PROPIONATE AND SALMETEROL 1 DOSE(S): 50; 500 POWDER ORAL; RESPIRATORY (INHALATION) at 22:23

## 2025-01-11 RX ADMIN — PANTOPRAZOLE 40 MILLIGRAM(S): 40 TABLET, DELAYED RELEASE ORAL at 06:44

## 2025-01-11 RX ADMIN — IPRATROPIUM BROMIDE AND ALBUTEROL SULFATE 3 MILLILITER(S): .5; 2.5 SOLUTION RESPIRATORY (INHALATION) at 20:22

## 2025-01-11 RX ADMIN — ENOXAPARIN SODIUM 40 MILLIGRAM(S): 60 INJECTION INTRAVENOUS; SUBCUTANEOUS at 06:45

## 2025-01-11 NOTE — PROGRESS NOTE ADULT - SUBJECTIVE AND OBJECTIVE BOX
PATIENT SEEN AND EXAMINED BY QUIANA CHAWLA M.D. ON :- 1/11/25  DATE OF SERVICE:     1/11/25        Interim events noted,Labs ,Radiological studies and Cardiology tests reviewed .    Patient is a 54y old  Male who presents with a chief complaint of Pleural effusion (11 Jan 2025 16:59)      HPI:  Patient is a 54M, Pmhx of CA gastric w/ mets on FLOT chemo s/p J tube removal, HTN. Patient presented with 3 weeks of shortness of breath for 3 week he is currently able to walk 1 block without feeling sob prior to the onset of symptoms he was able to walk 10 blocks. It is associated with palpitations on walking. He has progressive bilateral leg swelling. He noticed mild abdominal distension. He denies chest pain, orthopnea, pnd, lightheadedness, cough, rhinorrhea,  facial pain, n/v/d/c.     Patient's recent chemo session was 11/16 with Dr. Palma at [facility]. He finished 4 sessions so far. (07 Jan 2025 19:37)      PAST MEDICAL & SURGICAL HISTORY:  HTN (hypertension)      History of prediabetes      Hyperlipidemia      Cigarette smoker      Malignant neoplasm of body of stomach      No significant past surgical history          PREVIOUS DIAGNOSTIC TESTING:      ECHO  FINDINGS:    STRESS  FINDINGS:    CATHETERIZATION  FINDINGS:    MEDICATIONS  (STANDING):  albuterol/ipratropium for Nebulization 3 milliLiter(s) Nebulizer every 6 hours  enoxaparin Injectable 40 milliGRAM(s) SubCutaneous every 24 hours  fluticasone propionate/ salmeterol 250-50 MICROgram(s) Diskus 1 Dose(s) Inhalation two times a day  furosemide   Injectable 40 milliGRAM(s) IV Push two times a day  pantoprazole    Tablet 40 milliGRAM(s) Oral before breakfast    MEDICATIONS  (PRN):  acetaminophen     Tablet .. 650 milliGRAM(s) Oral every 6 hours PRN Temp greater or equal to 38C (100.4F), Mild Pain (1 - 3)  aluminum hydroxide/magnesium hydroxide/simethicone Suspension 30 milliLiter(s) Oral every 4 hours PRN Dyspepsia  melatonin 3 milliGRAM(s) Oral at bedtime PRN Insomnia  ondansetron Injectable 4 milliGRAM(s) IV Push every 8 hours PRN Nausea and/or Vomiting      FAMILY HISTORY:  No pertinent family history in first degree relatives        SOCIAL HISTORY:    CIGARETTES:    ALCOHOL:    REVIEW OF SYSTEMS:  CONSTITUTIONAL: No fever, weight loss, or fatigue  EYES: No eye pain, visual disturbances, or discharge  ENMT:  No difficulty hearing, tinnitus, vertigo; No sinus or throat pain  NECK: No pain or stiffness  RESPIRATORY: No cough, wheezing, chills or hemoptysis; No shortness of breath  CARDIOVASCULAR: No chest pain, palpitations, dizziness, or leg swelling  GASTROINTESTINAL: No abdominal or epigastric pain. No nausea, vomiting, or hematemesis; No diarrhea or constipation. No melena or hematochezia.  GENITOURINARY: No dysuria, frequency, hematuria, or incontinence  NEUROLOGICAL: No headaches, memory loss, loss of strength, numbness, or tremors  SKIN: No itching, burning, rashes, or lesions   LYMPH NODES: No enlarged glands  ENDOCRINE: No heat or cold intolerance; No hair loss  MUSCULOSKELETAL: No joint pain or swelling; No muscle, back, or extremity pain  PSYCHIATRIC: No depression, anxiety, mood swings, or difficulty sleeping  HEME/LYMPH: No easy bruising, or bleeding gums  ALLERY AND IMMUNOLOGIC: No hives or eczema    Vital Signs Last 24 Hrs  T(C): 36.4 (11 Jan 2025 21:27), Max: 37.2 (11 Jan 2025 13:27)  T(F): 97.5 (11 Jan 2025 21:27), Max: 98.9 (11 Jan 2025 13:27)  HR: 108 (11 Jan 2025 21:27) (96 - 108)  BP: 104/70 (11 Jan 2025 21:27) (101/66 - 104/70)  BP(mean): 80 (11 Jan 2025 21:27) (78 - 80)  RR: 18 (11 Jan 2025 21:27) (18 - 19)  SpO2: 92% (11 Jan 2025 21:27) (92% - 94%)    Parameters below as of 11 Jan 2025 21:27  Patient On (Oxygen Delivery Method): room air          PHYSICAL EXAM:  GENERAL: NAD, well-groomed, well-developed  HEAD:  Atraumatic, Normocephalic  EYES: EOMI, PERRLA, conjunctiva and sclera clear  ENMT: No tonsillar erythema, exudates, or enlargement; Moist mucous membranes, Good dentition, No lesions  NECK: Supple, No JVD, Normal thyroid  NERVOUS SYSTEM:  Alert & Oriented X3, Good concentration; Motor Strength 5/5 B/L upper and lower extremities; DTRs 2+ intact and symmetric  CHEST/LUNG: Clear to percussion bilaterally; No rales, rhonchi, wheezing, or rubs  HEART: Regular rate and rhythm; No murmurs, rubs, or gallops  ABDOMEN: Soft, Nontender, Nondistended; Bowel sounds present  EXTREMITIES:  2+ Peripheral Pulses, No clubbing, cyanosis, or edema  LYMPH: No lymphadenopathy noted  SKIN: No rashes or lesions      INTERPRETATION OF TELEMETRY:    ECG:    [facility]:     LABS:                        11.3   11.25 )-----------( 108      ( 11 Jan 2025 07:05 )             33.1     01-11    140  |  107  |  12  ----------------------------<  119[H]  3.8   |  27  |  0.54    Ca    8.1[L]      11 Jan 2025 07:05  Phos  3.2     01-11  Mg     1.8     01-11    TPro  4.6[L]  /  Alb  2.0[L]  /  TBili  0.7  /  DBili  x   /  AST  49[H]  /  ALT  30  /  AlkPhos  118  01-11          Urinalysis Basic - ( 11 Jan 2025 07:05 )    Color: x / Appearance: x / SG: x / pH: x  Gluc: 119 mg/dL / Ketone: x  / Bili: x / Urobili: x   Blood: x / Protein: x / Nitrite: x   Leuk Esterase: x / RBC: x / WBC x   Sq Epi: x / Non Sq Epi: x / Bacteria: x      Lipid Panel:   I&O's Summary      RADIOLOGY & ADDITIONAL STUDIES:    < from: TTE W or WO Ultrasound Enhancing Agent (01.10.25 @ 09:54) >     CONCLUSIONS:      1. Left ventricular cavity is normal in size. Left ventricular wall thickness is normal. Left ventricular systolic function is hyperdynamic with an ejection fraction of 73 % by Zuniga's method of disks. There are no regional wall motion abnormalities seen.   2. Normal right ventricular cavity size and normal right ventricular systolic function.   3. Trileaflet aortic valve. Fibrocalcific aortic valve sclerosis without stenosis.   4. No evidence of aortic regurgitation.   5. Systolic anterior motion of the mitral valve with an increased left ventricular outflow tract gradient indicative of mild left ventricular outflow tract obstruction.   6. Atleast mild mitral regurgitation. The mitral regurgitant jet is eccentric jet, severity may be underestimated.   7. Moderate pulmonary hypertension.   8. Left pleural effusion noted.   9. No pericardial effusion seen.  10. No prior echocardiogram is available for comparison.    < end of copied text >

## 2025-01-11 NOTE — PROGRESS NOTE ADULT - SUBJECTIVE AND OBJECTIVE BOX
Patient is a 54y old  Male who presents with a chief complaint of Pleural effusion (10 Zack 2025 16:02)       Pt is seen and examined  pt is awake and lying in bed  pt seems comfortable now breathing on RA           ROS:  Negative except for:    MEDICATIONS  (STANDING):  albuterol/ipratropium for Nebulization 3 milliLiter(s) Nebulizer every 6 hours  enoxaparin Injectable 40 milliGRAM(s) SubCutaneous every 24 hours  fluticasone propionate/ salmeterol 250-50 MICROgram(s) Diskus 1 Dose(s) Inhalation two times a day  furosemide   Injectable 40 milliGRAM(s) IV Push two times a day  pantoprazole    Tablet 40 milliGRAM(s) Oral before breakfast    MEDICATIONS  (PRN):  acetaminophen     Tablet .. 650 milliGRAM(s) Oral every 6 hours PRN Temp greater or equal to 38C (100.4F), Mild Pain (1 - 3)  aluminum hydroxide/magnesium hydroxide/simethicone Suspension 30 milliLiter(s) Oral every 4 hours PRN Dyspepsia  melatonin 3 milliGRAM(s) Oral at bedtime PRN Insomnia  ondansetron Injectable 4 milliGRAM(s) IV Push every 8 hours PRN Nausea and/or Vomiting      Allergies    No Known Allergies    Intolerances        Vital Signs Last 24 Hrs  T(C): 37.2 (11 Jan 2025 13:27), Max: 37.2 (11 Jan 2025 13:27)  T(F): 98.9 (11 Jan 2025 13:27), Max: 98.9 (11 Jan 2025 13:27)  HR: 96 (11 Jan 2025 13:27) (96 - 115)  BP: 101/66 (11 Jan 2025 13:27) (101/66 - 125/62)  BP(mean): 78 (11 Jan 2025 13:27) (78 - 81)  RR: 19 (11 Jan 2025 13:27) (18 - 19)  SpO2: 94% (11 Jan 2025 13:27) (94% - 94%)    Parameters below as of 11 Jan 2025 13:27  Patient On (Oxygen Delivery Method): room air        PHYSICAL EXAM  General: adult in NAD  HEENT: clear oropharynx, anicteric sclera, pink conjunctiva  Neck: supple  CV: normal S1/S2 with no murmur rubs or gallops  Lungs: positive air movement b/l ant lungs,clear to auscultation, no wheezes, no rales  Abdomen: soft non-tender non-distended, no hepatosplenomegaly  Ext: no clubbing cyanosis or edema  Skin: no rashes and no petechiae  Neuro: alert and oriented X 4, no focal deficits  LABS:                          11.3   11.25 )-----------( 108      ( 11 Jan 2025 07:05 )             33.1         Mean Cell Volume : 92.2 fl  Mean Cell Hemoglobin : 31.5 pg  Mean Cell Hemoglobin Concentration : 34.1 g/dL  Auto Neutrophil # : 7.10 K/uL  Auto Lymphocyte # : 2.19 K/uL  Auto Monocyte # : 1.50 K/uL  Auto Eosinophil # : 0.33 K/uL  Auto Basophil # : 0.06 K/uL  Auto Neutrophil % : 63.2 %  Auto Lymphocyte % : 19.5 %  Auto Monocyte % : 13.3 %  Auto Eosinophil % : 2.9 %  Auto Basophil % : 0.5 %    Serial CBC's  01-11 @ 07:05  Hct-33.1 / Hgb-11.3 / Plat-108 / RBC-3.59 / WBC-11.25          Serial CBC's  01-10 @ 06:21  Hct-34.8 / Hgb-11.9 / Plat-104 / RBC-3.77 / WBC-10.72          Serial CBC's  01-09 @ 06:30  Hct-35.7 / Hgb-12.0 / Plat-87 / RBC-3.89 / WBC-8.78          Serial CBC's  01-08 @ 06:45  Hct-37.6 / Hgb-12.8 / Plat-93 / RBC-4.10 / WBC-9.18            01-11    140  |  107  |  12  ----------------------------<  119[H]  3.8   |  27  |  0.54    Ca    8.1[L]      11 Jan 2025 07:05  Phos  3.2     01-11  Mg     1.8     01-11    TPro  4.6[L]  /  Alb  2.0[L]  /  TBili  0.7  /  DBili  x   /  AST  49[H]  /  ALT  30  /  AlkPhos  118  01-11          WBC Count: 11.25 K/uL (01-11-25 @ 07:05)  Hemoglobin: 11.3 g/dL (01-11-25 @ 07:05)  Hematocrit: 33.1 % (01-11-25 @ 07:05)  Platelet Count - Automated: 108 K/uL (01-11-25 @ 07:05)  WBC Count: 10.72 K/uL (01-10-25 @ 06:21)  Hemoglobin: 11.9 g/dL (01-10-25 @ 06:21)  Hematocrit: 34.8 % (01-10-25 @ 06:21)  Platelet Count - Automated: 104 K/uL (01-10-25 @ 06:21)  Platelet Count - Automated: 87 K/uL (01-09-25 @ 06:30)  WBC Count: 8.78 K/uL (01-09-25 @ 06:30)  Hemoglobin: 12.0 g/dL (01-09-25 @ 06:30)  Hematocrit: 35.7 % (01-09-25 @ 06:30)  WBC Count: 9.18 K/uL (01-08-25 @ 06:45)  Hemoglobin: 12.8 g/dL (01-08-25 @ 06:45)  Hematocrit: 37.6 % (01-08-25 @ 06:45)  Platelet Count - Automated: 93 K/uL (01-08-25 @ 06:45)  WBC Count: 10.72 K/uL (01-07-25 @ 11:20)  Hemoglobin: 13.0 g/dL (01-07-25 @ 11:20)  Hematocrit: 38.6 % (01-07-25 @ 11:20)  Platelet Count - Automated: 83 K/uL (01-07-25 @ 11:20)                BLOOD SMEAR INTERPRETATION:       RADIOLOGY & ADDITIONAL STUDIES:

## 2025-01-11 NOTE — PROGRESS NOTE ADULT - SUBJECTIVE AND OBJECTIVE BOX
Patient is a 54y old  Male who presents with a chief complaint of Pleural effusion (10 Zack 2025     pt seen and examined    MEDICATIONS  (STANDING):  albuterol/ipratropium for Nebulization 3 milliLiter(s) Nebulizer every 6 hours  enoxaparin Injectable 40 milliGRAM(s) SubCutaneous every 24 hours  fluticasone propionate/ salmeterol 250-50 MICROgram(s) Diskus 1 Dose(s) Inhalation two times a day  furosemide   Injectable 40 milliGRAM(s) IV Push two times a day  pantoprazole    Tablet 40 milliGRAM(s) Oral before breakfast    MEDICATIONS  (PRN):  acetaminophen     Tablet .. 650 milliGRAM(s) Oral every 6 hours PRN Temp greater or equal to 38C (100.4F), Mild Pain (1 - 3)  aluminum hydroxide/magnesium hydroxide/simethicone Suspension 30 milliLiter(s) Oral every 4 hours PRN Dyspepsia  melatonin 3 milliGRAM(s) Oral at bedtime PRN Insomnia  ondansetron Injectable 4 milliGRAM(s) IV Push every 8 hours PRN Nausea and/or Vomiting    REVIEW OF SYSTEMS:  CONSTITUTIONAL: No fever,   EYES: no acute visual disturbances  NECK: No pain or stiffness  RESPIRATORY: No cough; intermittent shortness of breath  CARDIOVASCULAR: No chest pain, no palpitations  GASTROINTESTINAL: No pain. No nausea or vomiting; No diarrhea   NEUROLOGICAL: No headache or numbness, no tremors  MUSCULOSKELETAL: No joint pain, no muscle pain  GENITOURINARY: no dysuria, no frequency, no hesitancy  ALL OTHER  ROS negative        Vital Signs Last 24 Hrs  T(C): 37.2 (01-11-25 @ 13:27), Max: 37.2 (01-11-25 @ 13:27)  T(F): 98.9 (01-11-25 @ 13:27), Max: 98.9 (01-11-25 @ 13:27)  HR: 96 (01-11-25 @ 13:27) (96 - 115)  BP: 101/66 (01-11-25 @ 13:27) (101/66 - 125/62)  BP(mean): 78 (01-11-25 @ 13:27) (78 - 81)  RR: 19 (01-11-25 @ 13:27) (18 - 19)  SpO2: 94% (01-11-25 @ 13:27) (94% - 94%)    GENERAL: NAD  HEENT: Normocephalic; moist mucous membranes;   NECK : supple  CHEST/LUNG:dec breath sounds at abses  HEART: S1 S2  +  ABDOMEN: Soft, Nontender, distended; Bowel sounds present  EXTREMITIES: no cyanosis; + edema; no calf tenderness  SKIN: warm and dry; no rash  NERVOUS SYSTEM:  Awake and alert; _________________________________________________    LABS:  01-11    140  |  107  |  12  ----------------------------<  119[H]  3.8   |  27  |  0.54    Ca    8.1[L]      11 Jan 2025 07:05  Phos  3.2     01-11  Mg     1.8     01-11    TPro  4.6[L]  /  Alb  2.0[L]  /  TBili  0.7  /  DBili      /  AST  49[H]  /  ALT  30  /  AlkPhos  118  01-11    Creatinine Trend: 0.54 <--, 0.63 <--, 0.48 <--, 0.56 <--, 0.58 <--                        11.3   11.25 )-----------( 108      ( 11 Jan 2025 07:05 )             33.1     Urine Studies:  Urinalysis Basic - ( 11 Jan 2025 07:05 )    Color:  / Appearance:  / SG:  / pH:   Gluc: 119 mg/dL / Ketone:   / Bili:  / Urobili:    Blood:  / Protein:  / Nitrite:    Leuk Esterase:  / RBC:  / WBC    Sq Epi:  / Non Sq Epi:  / Bacteria:               LIVER FUNCTIONS - ( 11 Jan 2025 07:05 )  Alb: 2.0 g/dL / Pro: 4.6 g/dL / ALK PHOS: 118 U/L / ALT: 30 U/L DA / AST: 49 U/L / GGT: x                     RADIOLOGY & ADDITIONAL TESTS:  < from: Xray Chest 1 View- PORTABLE-Routine (Xray Chest 1 View- PORTABLE-Routine in AM.) (01.10.25 @ 09:31) >    ACC: [id number] EXAM:  XR CHEST PORTABLE ROUTINE 1V   ORDERED BY: MA JANAE RICHARDSONVINICIO     ACC: [id number] EXAM:  XR CHEST PORTABLE ROUTINE 1V   ORDERED BY:  CLIVE KAHN     PROCEDURE DATE:  01/09/2025          INTERPRETATION:  TIME OF EXAM: January 9, 2025 at 9:00 AM.    CLINICAL INFORMATION: Bilateral pleural effusion.    COMPARISON:  CTA of the chest from January 7, 2025.    TECHNIQUE:   AP Portable chest x-ray.    INTERPRETATION:    Heart size and the mediastinum cannot be accurately evaluated on this   projection. Calcified thoracic aorta.  There is a CT injectable right IJ approach port with tip at the right   atrial/IVC junction.  Calcified right upper lobe granuloma.  There is left midlung linear atelectasis versus extension of pleural   fluid into the left major fissure.  There are bilateral small pleural effusions, larger on the left, with   likely associated passive atelectasis. The right-sided effusion extends   into the base of the right major fissure.  No pneumothorax is seen.  There is no acute bony abnormality.    AP portable chest x-ray from January 10, 2025 at 9:06 AM:    CLINICAL INFORMATION: Pleural effusion.    INTERPRETATION:    There is a small right pleural effusion. Previous extension into the base   of themajor fissure is no longer apparent.  Decreased left pleural effusion with residual left lower/retrocardiac   opacity seen. Continued left midlung linear atelectasis versus pleural   fluid extending into the left major fissure.  No pneumothorax.      IMPRESSION:  Small right pleural effusion on the most recent image.   Extension into the base of the right major fissure is no longer seen.    Decreased left pleural effusion with residual left lower/retrocardiac   opacity on that image. The residualopacities may correspond to loculated   left pleural fluid with associated passive atelectasis. Atelectasis of   other cause and/or other pathology including, but not limited to,   pneumonia is not excluded.    Continued left midlung linear atelectasis versus pleural fluid extending   into the left major fissure.    --- End of Report ---            ABBI SALVADOR MD; Attending Radiologist  This document has been electronically signed. Azck 10 2025 12:15PM    < end of copied text >    Imaging Personally Reviewed:  YES/NO    Consultant(s) Notes Reviewed:   YES/ No    Care Discussed with Consultants :     Plan of care was discussed with patient and /or primary care giver; all questions and concerns were addressed and care was aligned with patient's wishes.

## 2025-01-12 LAB
ALBUMIN SERPL ELPH-MCNC: 2 G/DL — LOW (ref 3.5–5)
ALP SERPL-CCNC: 129 U/L — HIGH (ref 40–120)
ALT FLD-CCNC: 34 U/L DA — SIGNIFICANT CHANGE UP (ref 10–60)
ANION GAP SERPL CALC-SCNC: 6 MMOL/L — SIGNIFICANT CHANGE UP (ref 5–17)
AST SERPL-CCNC: 51 U/L — HIGH (ref 10–40)
BASOPHILS # BLD AUTO: 0.05 K/UL — SIGNIFICANT CHANGE UP (ref 0–0.2)
BASOPHILS NFR BLD AUTO: 0.5 % — SIGNIFICANT CHANGE UP (ref 0–2)
BILIRUB SERPL-MCNC: 0.7 MG/DL — SIGNIFICANT CHANGE UP (ref 0.2–1.2)
BUN SERPL-MCNC: 13 MG/DL — SIGNIFICANT CHANGE UP (ref 7–18)
CALCIUM SERPL-MCNC: 8.6 MG/DL — SIGNIFICANT CHANGE UP (ref 8.4–10.5)
CHLORIDE SERPL-SCNC: 103 MMOL/L — SIGNIFICANT CHANGE UP (ref 96–108)
CO2 SERPL-SCNC: 26 MMOL/L — SIGNIFICANT CHANGE UP (ref 22–31)
CREAT SERPL-MCNC: 0.52 MG/DL — SIGNIFICANT CHANGE UP (ref 0.5–1.3)
EGFR: 121 ML/MIN/1.73M2 — SIGNIFICANT CHANGE UP
EOSINOPHIL # BLD AUTO: 0.42 K/UL — SIGNIFICANT CHANGE UP (ref 0–0.5)
EOSINOPHIL NFR BLD AUTO: 4.1 % — SIGNIFICANT CHANGE UP (ref 0–6)
GLUCOSE SERPL-MCNC: 110 MG/DL — HIGH (ref 70–99)
HCT VFR BLD CALC: 34.1 % — LOW (ref 39–50)
HGB BLD-MCNC: 11.6 G/DL — LOW (ref 13–17)
IMM GRANULOCYTES NFR BLD AUTO: 0.8 % — SIGNIFICANT CHANGE UP (ref 0–0.9)
LYMPHOCYTES # BLD AUTO: 2.33 K/UL — SIGNIFICANT CHANGE UP (ref 1–3.3)
LYMPHOCYTES # BLD AUTO: 22.9 % — SIGNIFICANT CHANGE UP (ref 13–44)
MAGNESIUM SERPL-MCNC: 1.7 MG/DL — SIGNIFICANT CHANGE UP (ref 1.6–2.6)
MCHC RBC-ENTMCNC: 31.5 PG — SIGNIFICANT CHANGE UP (ref 27–34)
MCHC RBC-ENTMCNC: 34 G/DL — SIGNIFICANT CHANGE UP (ref 32–36)
MCV RBC AUTO: 92.7 FL — SIGNIFICANT CHANGE UP (ref 80–100)
MONOCYTES # BLD AUTO: 1.3 K/UL — HIGH (ref 0–0.9)
MONOCYTES NFR BLD AUTO: 12.8 % — SIGNIFICANT CHANGE UP (ref 2–14)
NEUTROPHILS # BLD AUTO: 6.01 K/UL — SIGNIFICANT CHANGE UP (ref 1.8–7.4)
NEUTROPHILS NFR BLD AUTO: 58.9 % — SIGNIFICANT CHANGE UP (ref 43–77)
NRBC # BLD: 0 /100 WBCS — SIGNIFICANT CHANGE UP (ref 0–0)
PHOSPHATE SERPL-MCNC: 3.5 MG/DL — SIGNIFICANT CHANGE UP (ref 2.5–4.5)
PLATELET # BLD AUTO: 114 K/UL — LOW (ref 150–400)
POTASSIUM SERPL-MCNC: 3.7 MMOL/L — SIGNIFICANT CHANGE UP (ref 3.5–5.3)
POTASSIUM SERPL-SCNC: 3.7 MMOL/L — SIGNIFICANT CHANGE UP (ref 3.5–5.3)
PROT SERPL-MCNC: 5 G/DL — LOW (ref 6–8.3)
RBC # BLD: 3.68 M/UL — LOW (ref 4.2–5.8)
RBC # FLD: 17.4 % — HIGH (ref 10.3–14.5)
SODIUM SERPL-SCNC: 135 MMOL/L — SIGNIFICANT CHANGE UP (ref 135–145)
WBC # BLD: 10.19 K/UL — SIGNIFICANT CHANGE UP (ref 3.8–10.5)
WBC # FLD AUTO: 10.19 K/UL — SIGNIFICANT CHANGE UP (ref 3.8–10.5)

## 2025-01-12 RX ADMIN — FLUTICASONE PROPIONATE AND SALMETEROL 1 DOSE(S): 50; 500 POWDER ORAL; RESPIRATORY (INHALATION) at 21:50

## 2025-01-12 RX ADMIN — FLUTICASONE PROPIONATE AND SALMETEROL 1 DOSE(S): 50; 500 POWDER ORAL; RESPIRATORY (INHALATION) at 09:40

## 2025-01-12 RX ADMIN — IPRATROPIUM BROMIDE AND ALBUTEROL SULFATE 3 MILLILITER(S): .5; 2.5 SOLUTION RESPIRATORY (INHALATION) at 14:29

## 2025-01-12 RX ADMIN — Medication 40 MILLIGRAM(S): at 05:49

## 2025-01-12 RX ADMIN — PANTOPRAZOLE 40 MILLIGRAM(S): 40 TABLET, DELAYED RELEASE ORAL at 07:56

## 2025-01-12 RX ADMIN — Medication 40 MILLIGRAM(S): at 14:32

## 2025-01-12 RX ADMIN — IPRATROPIUM BROMIDE AND ALBUTEROL SULFATE 3 MILLILITER(S): .5; 2.5 SOLUTION RESPIRATORY (INHALATION) at 20:20

## 2025-01-12 RX ADMIN — IPRATROPIUM BROMIDE AND ALBUTEROL SULFATE 3 MILLILITER(S): .5; 2.5 SOLUTION RESPIRATORY (INHALATION) at 09:10

## 2025-01-12 RX ADMIN — ENOXAPARIN SODIUM 40 MILLIGRAM(S): 60 INJECTION INTRAVENOUS; SUBCUTANEOUS at 05:49

## 2025-01-12 RX ADMIN — ONDANSETRON 4 MILLIGRAM(S): 4 TABLET ORAL at 18:41

## 2025-01-12 RX ADMIN — IPRATROPIUM BROMIDE AND ALBUTEROL SULFATE 3 MILLILITER(S): .5; 2.5 SOLUTION RESPIRATORY (INHALATION) at 02:09

## 2025-01-12 NOTE — PROGRESS NOTE ADULT - SUBJECTIVE AND OBJECTIVE BOX
Time of Visit:  Patient seen and examined. pat is lying in bed on ambient air     MEDICATIONS  (STANDING):  albuterol/ipratropium for Nebulization 3 milliLiter(s) Nebulizer every 6 hours  enoxaparin Injectable 40 milliGRAM(s) SubCutaneous every 24 hours  fluticasone propionate/ salmeterol 250-50 MICROgram(s) Diskus 1 Dose(s) Inhalation two times a day  furosemide   Injectable 40 milliGRAM(s) IV Push two times a day  pantoprazole    Tablet 40 milliGRAM(s) Oral before breakfast      MEDICATIONS  (PRN):  acetaminophen     Tablet .. 650 milliGRAM(s) Oral every 6 hours PRN Temp greater or equal to 38C (100.4F), Mild Pain (1 - 3)  aluminum hydroxide/magnesium hydroxide/simethicone Suspension 30 milliLiter(s) Oral every 4 hours PRN Dyspepsia  melatonin 3 milliGRAM(s) Oral at bedtime PRN Insomnia  ondansetron Injectable 4 milliGRAM(s) IV Push every 8 hours PRN Nausea and/or Vomiting       Medications up to date at time of exam.      PHYSICAL EXAMINATION:  Patient has no new complaints.  GENERAL: The patient  in no apparent distress.     Vital Signs Last 24 Hrs  T(C): 37.2 (12 Jan 2025 13:29), Max: 37.2 (12 Jan 2025 13:29)  T(F): 99 (12 Jan 2025 13:29), Max: 99 (12 Jan 2025 13:29)  HR: 105 (12 Jan 2025 13:29) (105 - 108)  BP: 116/66 (12 Jan 2025 13:29) (104/70 - 116/66)  BP(mean): 80 (11 Jan 2025 21:27) (80 - 80)  RR: 18 (12 Jan 2025 13:29) (18 - 18)  SpO2: 94% (12 Jan 2025 13:29) (92% - 94%)    Parameters below as of 12 Jan 2025 13:29  Patient On (Oxygen Delivery Method): room air       (if applicable)    Chest Tube (if applicable)    HEENT: Head is normocephalic and atraumatic. Extraocular muscles are intact. Mucous membranes are moist.     NECK: Supple, no palpable adenopathy.    LUNGS: Fair bilateral air entry   no wheezing, rales, or rhonchi.    HEART: Regular rate and rhythm without murmur.    ABDOMEN: Soft, nontender, and nondistended.  No hepatosplenomegaly is noted.    : No painful voiding, no pelvic pain    EXTREMITIES: Without any cyanosis, clubbing, rash, lesions or edema.    NEUROLOGIC: Awake, alert, oriented, grossly intact    SKIN: Warm, dry, good turgor.      LABS:                        11.6   10.19 )-----------( 114      ( 12 Jan 2025 07:24 )             34.1     01-12    135  |  103  |  13  ----------------------------<  110[H]  3.7   |  26  |  0.52    Ca    8.6      12 Jan 2025 07:24  Phos  3.5     01-12  Mg     1.7     01-12    TPro  5.0[L]  /  Alb  2.0[L]  /  TBili  0.7  /  DBili  x   /  AST  51[H]  /  ALT  34  /  AlkPhos  129[H]  01-12      Urinalysis Basic - ( 12 Jan 2025 07:24 )    Color: x / Appearance: x / SG: x / pH: x  Gluc: 110 mg/dL / Ketone: x  / Bili: x / Urobili: x   Blood: x / Protein: x / Nitrite: x   Leuk Esterase: x / RBC: x / WBC x   Sq Epi: x / Non Sq Epi: x / Bacteria: x                      MICROBIOLOGY: (if applicable)    RADIOLOGY & ADDITIONAL STUDIES:  EKG:   CXR:  ECHO:    IMPRESSION: 54y Male PAST MEDICAL & SURGICAL HISTORY:  HTN (hypertension)      History of prediabetes      Hyperlipidemia      Cigarette smoker      Malignant neoplasm of body of stomach      No significant past surgical history       p/w            Time of Visit:  Patient seen and examined. pat is lying in bed on ambient air     MEDICATIONS  (STANDING):  albuterol/ipratropium for Nebulization 3 milliLiter(s) Nebulizer every 6 hours  enoxaparin Injectable 40 milliGRAM(s) SubCutaneous every 24 hours  fluticasone propionate/ salmeterol 250-50 MICROgram(s) Diskus 1 Dose(s) Inhalation two times a day  furosemide   Injectable 40 milliGRAM(s) IV Push two times a day  pantoprazole    Tablet 40 milliGRAM(s) Oral before breakfast      MEDICATIONS  (PRN):  acetaminophen     Tablet .. 650 milliGRAM(s) Oral every 6 hours PRN Temp greater or equal to 38C (100.4F), Mild Pain (1 - 3)  aluminum hydroxide/magnesium hydroxide/simethicone Suspension 30 milliLiter(s) Oral every 4 hours PRN Dyspepsia  melatonin 3 milliGRAM(s) Oral at bedtime PRN Insomnia  ondansetron Injectable 4 milliGRAM(s) IV Push every 8 hours PRN Nausea and/or Vomiting       Medications up to date at time of exam.      PHYSICAL EXAMINATION:  Patient has no new complaints.  GENERAL: The patient  in no apparent distress.     Vital Signs Last 24 Hrs  T(C): 37.2 (12 Jan 2025 13:29), Max: 37.2 (12 Jan 2025 13:29)  T(F): 99 (12 Jan 2025 13:29), Max: 99 (12 Jan 2025 13:29)  HR: 105 (12 Jan 2025 13:29) (105 - 108)  BP: 116/66 (12 Jan 2025 13:29) (104/70 - 116/66)  BP(mean): 80 (11 Jan 2025 21:27) (80 - 80)  RR: 18 (12 Jan 2025 13:29) (18 - 18)  SpO2: 94% (12 Jan 2025 13:29) (92% - 94%)    Parameters below as of 12 Jan 2025 13:29  Patient On (Oxygen Delivery Method): room air       (if applicable)    Chest Tube (if applicable)    HEENT: Head is normocephalic and atraumatic. Extraocular muscles are intact. Mucous membranes are moist.     NECK: Supple, no palpable adenopathy.    LUNGS: Fair bilateral air entry   no wheezing, rales, or rhonchi.    HEART: Regular rate and rhythm without murmur.    ABDOMEN: Soft, nontender, and nondistended.  No hepatosplenomegaly is noted.    : No painful voiding, no pelvic pain    EXTREMITIES: Without any cyanosis, clubbing, rash, lesions or edema.    NEUROLOGIC: Awake, alert, oriented, grossly intact    SKIN: Warm, dry, good turgor.      LABS:                        11.6   10.19 )-----------( 114      ( 12 Jan 2025 07:24 )             34.1     01-12    135  |  103  |  13  ----------------------------<  110[H]  3.7   |  26  |  0.52    Ca    8.6      12 Jan 2025 07:24  Phos  3.5     01-12  Mg     1.7     01-12    TPro  5.0[L]  /  Alb  2.0[L]  /  TBili  0.7  /  DBili  x   /  AST  51[H]  /  ALT  34  /  AlkPhos  129[H]  01-12      Urinalysis Basic - ( 12 Jan 2025 07:24 )    Color: x / Appearance: x / SG: x / pH: x  Gluc: 110 mg/dL / Ketone: x  / Bili: x / Urobili: x   Blood: x / Protein: x / Nitrite: x   Leuk Esterase: x / RBC: x / WBC x   Sq Epi: x / Non Sq Epi: x / Bacteria: x                      MICROBIOLOGY: (if applicable)    RADIOLOGY & ADDITIONAL STUDIES:  EKG:   CXR:  ECHO:    IMPRESSION: 54y Male PAST MEDICAL & SURGICAL HISTORY:  HTN (hypertension)      History of prediabetes      Hyperlipidemia      Cigarette smoker      Malignant neoplasm of body of stomach      No significant past surgical history       p/w         Impression: This is a 53 Y/O male with  CA gastric w/ mets on FLOT chemo s/p J tube removal. Presented to ED with 3 weeks of shortness of breath. Prior to symptoms  3 weeks ago was able to walk 10 blocks . For pulmonary follow up of Acute hypoxic respiratory failure secondary to B/L pleural Effusions.     Suggestion:  O2 saturation 94% room air. Using Oxygen supplementation on and off but appears comfortable on sitting position saturating room air. Monitor O2 saturation trend room air.   Continue Duoneb via nebulization Q 6 Hours.  Continue Advair 250-50 mcg Twice daily.   No need to drain/ tap Bilateral Pleural Effusions at this time ( not enough fluid pocket to drain ).   On lasix 40 mg IVP twice daily.    DVT / GI prophylactic. On lovenox 40 mg SQ daily.

## 2025-01-12 NOTE — PROGRESS NOTE ADULT - SUBJECTIVE AND OBJECTIVE BOX
Patient is a 54y old  Male who presents with a chief complaint of Pleural effusion     pt seen and examined    MEDICATIONS  (STANDING):  albuterol/ipratropium for Nebulization 3 milliLiter(s) Nebulizer every 6 hours  enoxaparin Injectable 40 milliGRAM(s) SubCutaneous every 24 hours  fluticasone propionate/ salmeterol 250-50 MICROgram(s) Diskus 1 Dose(s) Inhalation two times a day  furosemide   Injectable 40 milliGRAM(s) IV Push two times a day  pantoprazole    Tablet 40 milliGRAM(s) Oral before breakfast    MEDICATIONS  (PRN):  acetaminophen     Tablet .. 650 milliGRAM(s) Oral every 6 hours PRN Temp greater or equal to 38C (100.4F), Mild Pain (1 - 3)  aluminum hydroxide/magnesium hydroxide/simethicone Suspension 30 milliLiter(s) Oral every 4 hours PRN Dyspepsia  melatonin 3 milliGRAM(s) Oral at bedtime PRN Insomnia  ondansetron Injectable 4 milliGRAM(s) IV Push every 8 hours PRN Nausea and/or Vomiting      Vital Signs Last 24 Hrs  T(C): 37.2 (01-12-25 @ 21:39), Max: 37.2 (01-12-25 @ 13:29)  T(F): 99 (01-12-25 @ 21:39), Max: 99 (01-12-25 @ 13:29)  HR: 116 (01-12-25 @ 21:39) (105 - 116)  BP: 107/62 (01-12-25 @ 21:39) (107/62 - 118/64)  BP(mean): --  RR: 16 (01-12-25 @ 21:39) (16 - 18)  SpO2: 92% (01-12-25 @ 21:39) (92% - 94%)    REVIEW OF SYSTEMS:  CONSTITUTIONAL: No fever,   EYES: no acute visual disturbances  NECK: No pain or stiffness  RESPIRATORY: No cough; intermittent shortness of breath  CARDIOVASCULAR: No chest pain, no palpitations  GASTROINTESTINAL: No pain. No nausea or vomiting; No diarrhea   NEUROLOGICAL: No headache or numbness, no tremors  MUSCULOSKELETAL: No joint pain, no muscle pain  GENITOURINARY: no dysuria, no frequency, no hesitancy  ALL OTHER  ROS negative        GENERAL: NAD  HEENT: Normocephalic; moist mucous membranes;   NECK : supple  CHEST/LUNG:dec breath sounds at bases  HEART: S1 S2  +  ABDOMEN: Soft, Nontender, distended; Bowel sounds present  EXTREMITIES: no cyanosis; + edema; no calf tenderness  SKIN: warm and dry; no rash  NERVOUS SYSTEM:  Awake and alert; _________________________________________________      LABS:  01-12    135  |  103  |  13  ----------------------------<  110[H]  3.7   |  26  |  0.52    Ca    8.6      12 Jan 2025 07:24  Phos  3.5     01-12  Mg     1.7     01-12    TPro  5.0[L]  /  Alb  2.0[L]  /  TBili  0.7  /  DBili      /  AST  51[H]  /  ALT  34  /  AlkPhos  129[H]  01-12    Creatinine Trend: 0.52 <--, 0.54 <--, 0.63 <--, 0.48 <--, 0.56 <--, 0.58 <--                        11.6   10.19 )-----------( 114      ( 12 Jan 2025 07:24 )             34.1     Urine Studies:  Urinalysis Basic - ( 12 Jan 2025 07:24 )    Color:  / Appearance:  / SG:  / pH:   Gluc: 110 mg/dL / Ketone:   / Bili:  / Urobili:    Blood:  / Protein:  / Nitrite:    Leuk Esterase:  / RBC:  / WBC    Sq Epi:  / Non Sq Epi:  / Bacteria:               LIVER FUNCTIONS - ( 12 Jan 2025 07:24 )  Alb: 2.0 g/dL / Pro: 5.0 g/dL / ALK PHOS: 129 U/L / ALT: 34 U/L DA / AST: 51 U/L / GGT: x             RADIOLOGY & ADDITIONAL TESTS:  < from: Xray Chest 1 View- PORTABLE-Routine (Xray Chest 1 View- PORTABLE-Routine in AM.) (01.10.25 @ 09:31) >    ACC: [id number] EXAM:  XR CHEST PORTABLE ROUTINE 1V   ORDERED BY: SALBADOR BROWNE     ACC: [id number] EXAM:  XR CHEST PORTABLE ROUTINE 1V   ORDERED BY:  JOHANNFRANCOISE AKHN     PROCEDURE DATE:  01/09/2025          INTERPRETATION:  TIME OF EXAM: January 9, 2025 at 9:00 AM.    CLINICAL INFORMATION: Bilateral pleural effusion.    COMPARISON:  CTA of the chest from January 7, 2025.    TECHNIQUE:   AP Portable chest x-ray.    INTERPRETATION:    Heart size and the mediastinum cannot be accurately evaluated on this   projection. Calcified thoracic aorta.  There is a CT injectable right IJ approach port with tip at the right   atrial/IVC junction.  Calcified right upper lobe granuloma.  There is left midlung linear atelectasis versus extension of pleural   fluid into the left major fissure.  There are bilateral small pleural effusions, larger on the left, with   likely associated passive atelectasis. The right-sided effusion extends   into the base of the right major fissure.  No pneumothorax is seen.  There is no acute bony abnormality.    AP portable chest x-ray from January 10, 2025 at 9:06 AM:    CLINICAL INFORMATION: Pleural effusion.    INTERPRETATION:    There is a small right pleural effusion. Previous extension into the base   of themajor fissure is no longer apparent.  Decreased left pleural effusion with residual left lower/retrocardiac   opacity seen. Continued left midlung linear atelectasis versus pleural   fluid extending into the left major fissure.  No pneumothorax.      IMPRESSION:  Small right pleural effusion on the most recent image.   Extension into the base of the right major fissure is no longer seen.    Decreased left pleural effusion with residual left lower/retrocardiac   opacity on that image. The residualopacities may correspond to loculated   left pleural fluid with associated passive atelectasis. Atelectasis of   other cause and/or other pathology including, but not limited to,   pneumonia is not excluded.    Continued left midlung linear atelectasis versus pleural fluid extending   into the left major fissure.    --- End of Report ---            ABBI SALVADOR MD; Attending Radiologist  This document has been electronically signed. Zack 10 2025 12:15PM    < end of copied text >    Imaging Personally Reviewed:  YES/NO    Consultant(s) Notes Reviewed:   YES/ No    Care Discussed with Consultants :     Plan of care was discussed with patient and /or primary care giver; all questions and concerns were addressed and care was aligned with patient's wishes.

## 2025-01-12 NOTE — PROGRESS NOTE ADULT - SUBJECTIVE AND OBJECTIVE BOX
PATIENT SEEN AND EXAMINED BY QUIANA CHAWLA M.D. ON :- 1/12/25  DATE OF SERVICE:   1/12/25          Interim events noted,Labs ,Radiological studies and Cardiology tests reviewed .    Patient is a 54y old  Male who presents with a chief complaint of Pleural effusion (12 Jan 2025 19:12)      HPI:  Patient is a 54M, Pmhx of CA gastric w/ mets on FLOT chemo s/p J tube removal, HTN. Patient presented with 3 weeks of shortness of breath for 3 week he is currently able to walk 1 block without feeling sob prior to the onset of symptoms he was able to walk 10 blocks. It is associated with palpitations on walking. He has progressive bilateral leg swelling. He noticed mild abdominal distension. He denies chest pain, orthopnea, pnd, lightheadedness, cough, rhinorrhea,  facial pain, n/v/d/c.     Patient's recent chemo session was 11/16 with Dr. Palma at [facility]. He finished 4 sessions so far. (07 Jan 2025 19:37)      PAST MEDICAL & SURGICAL HISTORY:  HTN (hypertension)      History of prediabetes      Hyperlipidemia      Cigarette smoker      Malignant neoplasm of body of stomach      No significant past surgical history          PREVIOUS DIAGNOSTIC TESTING:      ECHO  FINDINGS:    STRESS  FINDINGS:    CATHETERIZATION  FINDINGS:    MEDICATIONS  (STANDING):  albuterol/ipratropium for Nebulization 3 milliLiter(s) Nebulizer every 6 hours  enoxaparin Injectable 40 milliGRAM(s) SubCutaneous every 24 hours  fluticasone propionate/ salmeterol 250-50 MICROgram(s) Diskus 1 Dose(s) Inhalation two times a day  furosemide   Injectable 40 milliGRAM(s) IV Push two times a day  pantoprazole    Tablet 40 milliGRAM(s) Oral before breakfast    MEDICATIONS  (PRN):  acetaminophen     Tablet .. 650 milliGRAM(s) Oral every 6 hours PRN Temp greater or equal to 38C (100.4F), Mild Pain (1 - 3)  aluminum hydroxide/magnesium hydroxide/simethicone Suspension 30 milliLiter(s) Oral every 4 hours PRN Dyspepsia  melatonin 3 milliGRAM(s) Oral at bedtime PRN Insomnia  ondansetron Injectable 4 milliGRAM(s) IV Push every 8 hours PRN Nausea and/or Vomiting      FAMILY HISTORY:  No pertinent family history in first degree relatives        SOCIAL HISTORY:    CIGARETTES:    ALCOHOL:    REVIEW OF SYSTEMS:  CONSTITUTIONAL: No fever, weight loss, or fatigue  EYES: No eye pain, visual disturbances, or discharge  ENMT:  No difficulty hearing, tinnitus, vertigo; No sinus or throat pain  NECK: No pain or stiffness  RESPIRATORY: No cough, wheezing, chills or hemoptysis; No shortness of breath  CARDIOVASCULAR: No chest pain, palpitations, dizziness, or leg swelling  GASTROINTESTINAL: No abdominal or epigastric pain. No nausea, vomiting, or hematemesis; No diarrhea or constipation. No melena or hematochezia.  GENITOURINARY: No dysuria, frequency, hematuria, or incontinence  NEUROLOGICAL: No headaches, memory loss, loss of strength, numbness, or tremors  SKIN: No itching, burning, rashes, or lesions   LYMPH NODES: No enlarged glands  ENDOCRINE: No heat or cold intolerance; No hair loss  MUSCULOSKELETAL: No joint pain or swelling; No muscle, back, or extremity pain  PSYCHIATRIC: No depression, anxiety, mood swings, or difficulty sleeping  HEME/LYMPH: No easy bruising, or bleeding gums  ALLERY AND IMMUNOLOGIC: No hives or eczema    Vital Signs Last 24 Hrs  T(C): 37.2 (12 Jan 2025 21:39), Max: 37.2 (12 Jan 2025 13:29)  T(F): 99 (12 Jan 2025 21:39), Max: 99 (12 Jan 2025 13:29)  HR: 116 (12 Jan 2025 21:39) (105 - 116)  BP: 107/62 (12 Jan 2025 21:39) (107/62 - 118/64)  BP(mean): --  RR: 16 (12 Jan 2025 21:39) (16 - 18)  SpO2: 92% (12 Jan 2025 21:39) (92% - 94%)    Parameters below as of 12 Jan 2025 21:39  Patient On (Oxygen Delivery Method): room air          PHYSICAL EXAM:  GENERAL: NAD, well-groomed, well-developed  HEAD:  Atraumatic, Normocephalic  EYES: EOMI, PERRLA, conjunctiva and sclera clear  ENMT: No tonsillar erythema, exudates, or enlargement; Moist mucous membranes, Good dentition, No lesions  NECK: Supple, No JVD, Normal thyroid  NERVOUS SYSTEM:  Alert & Oriented X3, Good concentration; Motor Strength 5/5 B/L upper and lower extremities; DTRs 2+ intact and symmetric  CHEST/LUNG: Clear to percussion bilaterally; No rales, rhonchi, wheezing, or rubs  HEART: Regular rate and rhythm; No murmurs, rubs, or gallops  ABDOMEN: Soft, Nontender, Nondistended; Bowel sounds present  EXTREMITIES:  2+ Peripheral Pulses, No clubbing, cyanosis, or edema  LYMPH: No lymphadenopathy noted  SKIN: No rashes or lesions      INTERPRETATION OF TELEMETRY:    ECG:    [facility]:     LABS:                        11.6   10.19 )-----------( 114      ( 12 Jan 2025 07:24 )             34.1     01-12    135  |  103  |  13  ----------------------------<  110[H]  3.7   |  26  |  0.52    Ca    8.6      12 Jan 2025 07:24  Phos  3.5     01-12  Mg     1.7     01-12    TPro  5.0[L]  /  Alb  2.0[L]  /  TBili  0.7  /  DBili  x   /  AST  51[H]  /  ALT  34  /  AlkPhos  129[H]  01-12          Urinalysis Basic - ( 12 Jan 2025 07:24 )    Color: x / Appearance: x / SG: x / pH: x  Gluc: 110 mg/dL / Ketone: x  / Bili: x / Urobili: x   Blood: x / Protein: x / Nitrite: x   Leuk Esterase: x / RBC: x / WBC x   Sq Epi: x / Non Sq Epi: x / Bacteria: x      Lipid Panel:   I&O's Summary    12 Jan 2025 07:01  -  12 Jan 2025 22:45  --------------------------------------------------------  IN: 0 mL / OUT: 400 mL / NET: -400 mL        RADIOLOGY & ADDITIONAL STUDIES:    < from: TTE W or WO Ultrasound Enhancing Agent (01.10.25 @ 09:54) >     CONCLUSIONS:      1. Left ventricular cavity is normal in size. Left ventricular wall thickness is normal. Left ventricular systolic function is hyperdynamic with an ejection fraction of 73 % by Zuniga's method of disks. There are no regional wall motion abnormalities seen.   2. Normal right ventricular cavity size and normal right ventricular systolic function.   3. Trileaflet aortic valve. Fibrocalcific aortic valve sclerosis without stenosis.   4. No evidence of aortic regurgitation.   5. Systolic anterior motion of the mitral valve with an increased left ventricular outflow tract gradient indicative of mild left ventricular outflow tract obstruction.   6. Atleast mild mitral regurgitation. The mitral regurgitant jet is eccentric jet, severity may be underestimated.   7. Moderate pulmonary hypertension.   8. Left pleural effusion noted.   9. No pericardial effusion seen.  10. No prior echocardiogram is available for comparison.    < end of copied text >

## 2025-01-13 LAB
ALBUMIN SERPL ELPH-MCNC: 1.9 G/DL — LOW (ref 3.5–5)
ALP SERPL-CCNC: 131 U/L — HIGH (ref 40–120)
ALT FLD-CCNC: 31 U/L DA — SIGNIFICANT CHANGE UP (ref 10–60)
ANION GAP SERPL CALC-SCNC: 6 MMOL/L — SIGNIFICANT CHANGE UP (ref 5–17)
AST SERPL-CCNC: 52 U/L — HIGH (ref 10–40)
BASOPHILS # BLD AUTO: 0.07 K/UL — SIGNIFICANT CHANGE UP (ref 0–0.2)
BASOPHILS NFR BLD AUTO: 0.6 % — SIGNIFICANT CHANGE UP (ref 0–2)
BILIRUB SERPL-MCNC: 0.6 MG/DL — SIGNIFICANT CHANGE UP (ref 0.2–1.2)
BUN SERPL-MCNC: 18 MG/DL — SIGNIFICANT CHANGE UP (ref 7–18)
CALCIUM SERPL-MCNC: 8.4 MG/DL — SIGNIFICANT CHANGE UP (ref 8.4–10.5)
CHLORIDE SERPL-SCNC: 106 MMOL/L — SIGNIFICANT CHANGE UP (ref 96–108)
CO2 SERPL-SCNC: 27 MMOL/L — SIGNIFICANT CHANGE UP (ref 22–31)
CREAT SERPL-MCNC: 0.59 MG/DL — SIGNIFICANT CHANGE UP (ref 0.5–1.3)
EGFR: 116 ML/MIN/1.73M2 — SIGNIFICANT CHANGE UP
EOSINOPHIL # BLD AUTO: 0.32 K/UL — SIGNIFICANT CHANGE UP (ref 0–0.5)
EOSINOPHIL NFR BLD AUTO: 2.9 % — SIGNIFICANT CHANGE UP (ref 0–6)
GLUCOSE SERPL-MCNC: 122 MG/DL — HIGH (ref 70–99)
HCT VFR BLD CALC: 31 % — LOW (ref 39–50)
HGB BLD-MCNC: 10.6 G/DL — LOW (ref 13–17)
IMM GRANULOCYTES NFR BLD AUTO: 1 % — HIGH (ref 0–0.9)
LYMPHOCYTES # BLD AUTO: 2.27 K/UL — SIGNIFICANT CHANGE UP (ref 1–3.3)
LYMPHOCYTES # BLD AUTO: 20.4 % — SIGNIFICANT CHANGE UP (ref 13–44)
MAGNESIUM SERPL-MCNC: 2 MG/DL — SIGNIFICANT CHANGE UP (ref 1.6–2.6)
MCHC RBC-ENTMCNC: 31.6 PG — SIGNIFICANT CHANGE UP (ref 27–34)
MCHC RBC-ENTMCNC: 34.2 G/DL — SIGNIFICANT CHANGE UP (ref 32–36)
MCV RBC AUTO: 92.5 FL — SIGNIFICANT CHANGE UP (ref 80–100)
MONOCYTES # BLD AUTO: 1.54 K/UL — HIGH (ref 0–0.9)
MONOCYTES NFR BLD AUTO: 13.9 % — SIGNIFICANT CHANGE UP (ref 2–14)
NEUTROPHILS # BLD AUTO: 6.8 K/UL — SIGNIFICANT CHANGE UP (ref 1.8–7.4)
NEUTROPHILS NFR BLD AUTO: 61.2 % — SIGNIFICANT CHANGE UP (ref 43–77)
NRBC # BLD: 0 /100 WBCS — SIGNIFICANT CHANGE UP (ref 0–0)
PHOSPHATE SERPL-MCNC: 3.9 MG/DL — SIGNIFICANT CHANGE UP (ref 2.5–4.5)
PLATELET # BLD AUTO: 118 K/UL — LOW (ref 150–400)
POTASSIUM SERPL-MCNC: 4.1 MMOL/L — SIGNIFICANT CHANGE UP (ref 3.5–5.3)
POTASSIUM SERPL-SCNC: 4.1 MMOL/L — SIGNIFICANT CHANGE UP (ref 3.5–5.3)
PROT SERPL-MCNC: 4.8 G/DL — LOW (ref 6–8.3)
RBC # BLD: 3.35 M/UL — LOW (ref 4.2–5.8)
RBC # FLD: 17.2 % — HIGH (ref 10.3–14.5)
SODIUM SERPL-SCNC: 139 MMOL/L — SIGNIFICANT CHANGE UP (ref 135–145)
WBC # BLD: 11.11 K/UL — HIGH (ref 3.8–10.5)
WBC # FLD AUTO: 11.11 K/UL — HIGH (ref 3.8–10.5)

## 2025-01-13 RX ADMIN — Medication 40 MILLIGRAM(S): at 17:57

## 2025-01-13 RX ADMIN — PANTOPRAZOLE 40 MILLIGRAM(S): 40 TABLET, DELAYED RELEASE ORAL at 06:11

## 2025-01-13 RX ADMIN — Medication 40 MILLIGRAM(S): at 06:09

## 2025-01-13 RX ADMIN — IPRATROPIUM BROMIDE AND ALBUTEROL SULFATE 3 MILLILITER(S): .5; 2.5 SOLUTION RESPIRATORY (INHALATION) at 02:42

## 2025-01-13 RX ADMIN — IPRATROPIUM BROMIDE AND ALBUTEROL SULFATE 3 MILLILITER(S): .5; 2.5 SOLUTION RESPIRATORY (INHALATION) at 20:36

## 2025-01-13 RX ADMIN — ONDANSETRON 4 MILLIGRAM(S): 4 TABLET ORAL at 17:11

## 2025-01-13 RX ADMIN — FLUTICASONE PROPIONATE AND SALMETEROL 1 DOSE(S): 50; 500 POWDER ORAL; RESPIRATORY (INHALATION) at 13:19

## 2025-01-13 RX ADMIN — IPRATROPIUM BROMIDE AND ALBUTEROL SULFATE 3 MILLILITER(S): .5; 2.5 SOLUTION RESPIRATORY (INHALATION) at 08:28

## 2025-01-13 RX ADMIN — FLUTICASONE PROPIONATE AND SALMETEROL 1 DOSE(S): 50; 500 POWDER ORAL; RESPIRATORY (INHALATION) at 22:45

## 2025-01-13 RX ADMIN — IPRATROPIUM BROMIDE AND ALBUTEROL SULFATE 3 MILLILITER(S): .5; 2.5 SOLUTION RESPIRATORY (INHALATION) at 14:48

## 2025-01-13 RX ADMIN — ENOXAPARIN SODIUM 40 MILLIGRAM(S): 60 INJECTION INTRAVENOUS; SUBCUTANEOUS at 06:10

## 2025-01-13 NOTE — PROGRESS NOTE ADULT - SUBJECTIVE AND OBJECTIVE BOX
NP Note discussed with  Primary Attending    Patient is a 54y old  Male who presents with a chief complaint of Pleural effusion (13 Jan 2025 11:37)      INTERVAL HPI/OVERNIGHT EVENTS: no new complaints    MEDICATIONS  (STANDING):  albuterol/ipratropium for Nebulization 3 milliLiter(s) Nebulizer every 6 hours  enoxaparin Injectable 40 milliGRAM(s) SubCutaneous every 24 hours  fluticasone propionate/ salmeterol 250-50 MICROgram(s) Diskus 1 Dose(s) Inhalation two times a day  furosemide   Injectable 40 milliGRAM(s) IV Push two times a day  pantoprazole    Tablet 40 milliGRAM(s) Oral before breakfast    MEDICATIONS  (PRN):  acetaminophen     Tablet .. 650 milliGRAM(s) Oral every 6 hours PRN Temp greater or equal to 38C (100.4F), Mild Pain (1 - 3)  aluminum hydroxide/magnesium hydroxide/simethicone Suspension 30 milliLiter(s) Oral every 4 hours PRN Dyspepsia  melatonin 3 milliGRAM(s) Oral at bedtime PRN Insomnia  ondansetron Injectable 4 milliGRAM(s) IV Push every 8 hours PRN Nausea and/or Vomiting      __________________________________________________  REVIEW OF SYSTEMS:    CONSTITUTIONAL: No fever,   EYES: no acute visual disturbances  NECK: No pain or stiffness  RESPIRATORY: No cough; No shortness of breath  CARDIOVASCULAR: No chest pain, no palpitations  GASTROINTESTINAL: No pain. No nausea or vomiting; No diarrhea   NEUROLOGICAL: No headache or numbness, no tremors  MUSCULOSKELETAL: No joint pain, no muscle pain  GENITOURINARY: no dysuria, no frequency, no hesitancy  PSYCHIATRY: no depression , no anxiety  ALL OTHER  ROS negative        Vital Signs Last 24 Hrs  T(C): 36.5 (13 Jan 2025 12:42), Max: 37.2 (12 Jan 2025 21:39)  T(F): 97.7 (13 Jan 2025 12:42), Max: 99 (12 Jan 2025 21:39)  HR: 99 (13 Jan 2025 12:42) (99 - 129)  BP: 103/65 (13 Jan 2025 12:42) (103/65 - 107/62)  BP(mean): 77 (13 Jan 2025 12:42) (77 - 77)  RR: 18 (13 Jan 2025 12:42) (16 - 18)  SpO2: 94% (13 Jan 2025 12:42) (92% - 94%)    Parameters below as of 13 Jan 2025 12:42  Patient On (Oxygen Delivery Method): room air        ________________________________________________  PHYSICAL EXAM:  GENERAL: NAD  HEENT: Normocephalic;  conjunctivae and sclerae clear; moist mucous membranes;   NECK : supple  CHEST/LUNG: Clear to auscultation bilaterally with good air entry   HEART: S1 S2  regular; no murmurs, gallops or rubs  ABDOMEN: Soft, Nontender, Nondistended; Bowel sounds present  EXTREMITIES: no cyanosis; no edema; no calf tenderness  SKIN: warm and dry; no rash  NERVOUS SYSTEM:  Awake and alert; Oriented  to place, person and time ; no new deficits    _________________________________________________  LABS:                        10.6   11.11 )-----------( 118      ( 13 Jan 2025 05:44 )             31.0     01-13    139  |  106  |  18  ----------------------------<  122[H]  4.1   |  27  |  0.59    Ca    8.4      13 Jan 2025 05:44  Phos  3.9     01-13  Mg     2.0     01-13    TPro  4.8[L]  /  Alb  1.9[L]  /  TBili  0.6  /  DBili  x   /  AST  52[H]  /  ALT  31  /  AlkPhos  131[H]  01-13      Urinalysis Basic - ( 13 Jan 2025 05:44 )    Color: x / Appearance: x / SG: x / pH: x  Gluc: 122 mg/dL / Ketone: x  / Bili: x / Urobili: x   Blood: x / Protein: x / Nitrite: x   Leuk Esterase: x / RBC: x / WBC x   Sq Epi: x / Non Sq Epi: x / Bacteria: x      CAPILLARY BLOOD GLUCOSE    RADIOLOGY & ADDITIONAL TESTS:      < from: Xray Chest 1 View- PORTABLE-Routine (Xray Chest 1 View- PORTABLE-Routine in AM.) (01.10.25 @ 09:31) >    ACC: [id number] EXAM:  XR CHEST PORTABLE ROUTINE 1V   ORDERED BY: SALBADOR BROWNE     ACC: [id number] EXAM:  XR CHEST PORTABLE ROUTINE 1V   ORDERED BY:  CLIVE KAHN     PROCEDURE DATE:  01/09/2025          INTERPRETATION:  TIME OF EXAM: January 9, 2025 at 9:00 AM.    CLINICAL INFORMATION: Bilateral pleural effusion.    COMPARISON:  CTA of the chest from January 7, 2025.    TECHNIQUE:   AP Portable chest x-ray.    INTERPRETATION:    Heart size and the mediastinum cannot be accurately evaluated on this   projection. Calcified thoracic aorta.  There is a CT injectable right IJ approach port with tip at the right   atrial/IVC junction.  Calcified right upper lobe granuloma.  There is left midlung linear atelectasis versus extension of pleural   fluid into the left major fissure.  There are bilateral small pleural effusions, larger on the left, with   likely associated passive atelectasis. The right-sided effusion extends   into the base of the right major fissure.  No pneumothorax is seen.  There is no acute bony abnormality.    AP portable chest x-ray from January 10, 2025 at 9:06 AM:    CLINICAL INFORMATION: Pleural effusion.    INTERPRETATION:    There is a small right pleural effusion. Previous extension into the base   of themajor fissure is no longer apparent.  Decreased left pleural effusion with residual left lower/retrocardiac   opacity seen. Continued left midlung linear atelectasis versus pleural   fluid extending into the left major fissure.  No pneumothorax.      IMPRESSION:  Small right pleural effusion on the most recent image.   Extension into the base of the right major fissure is no longer seen.    Decreased left pleural effusion with residual left lower/retrocardiac   opacity on that image. The residualopacities may correspond to loculated   left pleural fluid with associated passive atelectasis. Atelectasis of   other cause and/or other pathology including, but not limited to,   pneumonia is not excluded.    Continued left midlung linear atelectasis versus pleural fluid extending   into the left major fissure.    --- End of Report ---    < end of copied text >      < from: CT Angio Chest PE Protocol w/ IV Cont (01.07.25 @ 13:05) >    ACC: [id number] EXAM:  CT ANGIO CHEST PULM ART WAWIC   ORDERED BY:    KAM ZAMARRIPA     PROCEDURE DATE:  01/07/2025          INTERPRETATION:  CLINICAL INDICATION: Dyspnea, gastric cancer    TECHNIQUE: A volumetric acquisition of the chest was obtained from the   thoracic inlet to the upper abdomen during dynamic administration of   intravenous contrast according to the PE protocol. 3D MIP images were   provided.    CONTRAST/COMPLICATIONS:  IV Contrast: Omnipaque 350  60 cc administered   40 cc discarded  Oral Contrast: NONE  Complications: .    COMPARISON: CT abdomen 12/24/2024.      FINDINGS:  Pulmonary Artery: The study is technically adequate with a good contrast   bolus to the pulmonary arteries. There are no filling defects in the  pulmonary artery or its branches.    Aorta: Normal in course and caliber.    Cardiac: The heart is normal in size. No pericardial effusion.  Right   sided central venous access port with the tip terminating in the right   atrium.    Lungs/Airways/Pleura: Patent central airways. Moderate bilateral pleural   effusions with adjacent compressive atelectasis have increased since   December 24, 2024. Emphysema.  Scattered calcified granulomas.    Mediastinum/Lymph nodes: No thoracic adenopathy.    Upper Abdomen: Evaluation of the partially imaged upper abdomen   demonstrates ascites.    Bones and Soft Tissues: No acute osseous abnormality.      IMPRESSION:  No evidence of pulmonary embolism.    Moderate bilateral pleural effusions with adjacent compressive   atelectasis have increased since December 24, 2024.    --- End of Report ---    < end of copied text >      Imaging Personally Reviewed:  YES/NO    Consultant(s) Notes Reviewed:   YES/ No    Care Discussed with Consultants :     Plan of care was discussed with patient and /or primary care giver; all questions and concerns were addressed and care was aligned with patient's wishes.

## 2025-01-13 NOTE — PROGRESS NOTE ADULT - SUBJECTIVE AND OBJECTIVE BOX
Time of Visit:  Patient seen and examined.     MEDICATIONS  (STANDING):  albuterol/ipratropium for Nebulization 3 milliLiter(s) Nebulizer every 6 hours  enoxaparin Injectable 40 milliGRAM(s) SubCutaneous every 24 hours  fluticasone propionate/ salmeterol 250-50 MICROgram(s) Diskus 1 Dose(s) Inhalation two times a day  furosemide   Injectable 40 milliGRAM(s) IV Push two times a day  pantoprazole    Tablet 40 milliGRAM(s) Oral before breakfast      MEDICATIONS  (PRN):  acetaminophen     Tablet .. 650 milliGRAM(s) Oral every 6 hours PRN Temp greater or equal to 38C (100.4F), Mild Pain (1 - 3)  aluminum hydroxide/magnesium hydroxide/simethicone Suspension 30 milliLiter(s) Oral every 4 hours PRN Dyspepsia  melatonin 3 milliGRAM(s) Oral at bedtime PRN Insomnia  ondansetron Injectable 4 milliGRAM(s) IV Push every 8 hours PRN Nausea and/or Vomiting       Medications up to date at time of exam.    ROS; No fever, chills, cough , congestion on exam. Denies SOB.   PHYSICAL EXAMINATION:      Vital Signs Last 24 Hrs  T(C): 36.5 (13 Jan 2025 12:42), Max: 37.2 (12 Jan 2025 13:29)  T(F): 97.7 (13 Jan 2025 12:42), Max: 99 (12 Jan 2025 13:29)  HR: 99 (13 Jan 2025 12:42) (99 - 129)  BP: 103/65 (13 Jan 2025 12:42) (103/65 - 118/64)  BP(mean): 77 (13 Jan 2025 12:42) (77 - 77)  RR: 18 (13 Jan 2025 12:42) (16 - 18)  SpO2: 94% (13 Jan 2025 12:42) (92% - 94%)    Parameters below as of 13 Jan 2025 12:42  Patient On (Oxygen Delivery Method): room air       (if applicable)    General: Alert and oriented. Able to answer question with no SOB. No acute distress.     HEENT: Head is normocephalic and atraumatic. Extraocular muscles are intact. Mucous membranes are moist.     NECK: Supple, no palpable adenopathy.    LUNGS: Non labored. No wheezing. No use of accessory muscle.     HEART: S1 S2 Regular rate and rhythm . No JVD.     ABDOMEN: Soft, nontender, and nondistended. Active bowel sounds.     EXTREMITIES: Without any cyanosis, clubbing, rash, lesions .    NEUROLOGIC: Awake, alert, oriented.     SKIN: Warm, dry, good turgor.      LABS:                        10.6   11.11 )-----------( 118      ( 13 Jan 2025 05:44 )             31.0     01-13    139  |  106  |  18  ----------------------------<  122[H]  4.1   |  27  |  0.59    Ca    8.4      13 Jan 2025 05:44  Phos  3.9     01-13  Mg     2.0     01-13    TPro  4.8[L]  /  Alb  1.9[L]  /  TBili  0.6  /  DBili  x   /  AST  52[H]  /  ALT  31  /  AlkPhos  131[H]  01-13      Urinalysis Basic - ( 13 Jan 2025 05:44 )    Color: x / Appearance: x / SG: x / pH: x  Gluc: 122 mg/dL / Ketone: x  / Bili: x / Urobili: x   Blood: x / Protein: x / Nitrite: x   Leuk Esterase: x / RBC: x / WBC x   Sq Epi: x / Non Sq Epi: x / Bacteria: x    MICROBIOLOGY: (if applicable)    RADIOLOGY & ADDITIONAL STUDIES:  EKG:   < from: CT Angio Chest PE Protocol w/ IV Cont (01.07.25 @ 13:05) >    ACC: [id number] EXAM:  CT ANGIO CHEST PULM ART WAWIC   ORDERED BY:    KAM ZAMARRIPA     PROCEDURE DATE:  01/07/2025          INTERPRETATION:  CLINICAL INDICATION: Dyspnea, gastric cancer    TECHNIQUE: A volumetric acquisition of the chest was obtained from the   thoracic inlet to the upper abdomen during dynamic administration of   intravenous contrast according to the PE protocol. 3D MIP images were   provided.    CONTRAST/COMPLICATIONS:  IV Contrast: Omnipaque 350  60 cc administered   40 cc discarded  Oral Contrast: NONE  Complications: .    COMPARISON: CT abdomen 12/24/2024.      FINDINGS:  Pulmonary Artery: The study is technically adequate with a good contrast   bolus to the pulmonary arteries. There are no filling defects in the  pulmonary artery or its branches.    Aorta: Normal in course and caliber.    Cardiac: The heart is normal in size. No pericardial effusion.  Right   sided central venous access port with the tip terminating in the right   atrium.    Lungs/Airways/Pleura: Patent central airways. Moderate bilateral pleural   effusions with adjacent compressive atelectasis have increased since   December 24, 2024. Emphysema.  Scattered calcified granulomas.    Mediastinum/Lymph nodes: No thoracic adenopathy.    Upper Abdomen: Evaluation of the partially imaged upper abdomen   demonstrates ascites.    Bones and Soft Tissues: No acute osseous abnormality.      IMPRESSION:  No evidence of pulmonary embolism.    Moderate bilateral pleural effusions with adjacent compressive   atelectasis have increased since December 24, 2024.    --- End of Report ---            CAM CASTRO DO; Attending Radiologist  This document has been electronically signed. Jan 7 2025  1:29PM    < end of copied text >    CXR: < from: Xray Chest 1 View- PORTABLE-Routine (Xray Chest 1 View- PORTABLE-Routine in AM.) (01.10.25 @ 09:31) >    ACC: [id number] EXAM:  XR CHEST PORTABLE ROUTINE 1V   ORDERED BY: SALBADOR BROWNE     ACC: [id number] EXAM:  XR CHEST PORTABLE ROUTINE 1V   ORDERED BY:  CLIVE KAHN     PROCEDURE DATE:  01/09/2025          INTERPRETATION:  TIME OF EXAM: January 9, 2025 at 9:00 AM.    CLINICAL INFORMATION: Bilateral pleural effusion.    COMPARISON:  CTA of the chest from January 7, 2025.    TECHNIQUE:   AP Portable chest x-ray.    INTERPRETATION:    Heart size and the mediastinum cannot be accurately evaluated on this   projection. Calcified thoracic aorta.  There is a CT injectable right IJ approach port with tip at the right   atrial/IVC junction.  Calcified right upper lobe granuloma.  There is left midlung linear atelectasis versus extension of pleural   fluid into the left major fissure.  There are bilateral small pleural effusions, larger on the left, with   likely associated passive atelectasis. The right-sided effusion extends   into the base of the right major fissure.  No pneumothorax is seen.  There is no acute bony abnormality.    AP portable chest x-ray from January 10, 2025 at 9:06 AM:    CLINICAL INFORMATION: Pleural effusion.    INTERPRETATION:    There is a small right pleural effusion. Previous extension into the base   of themajor fissure is no longer apparent.  Decreased left pleural effusion with residual left lower/retrocardiac   opacity seen. Continued left midlung linear atelectasis versus pleural   fluid extending into the left major fissure.  No pneumothorax.      IMPRESSION:  Small right pleural effusion on the most recent image.   Extension into the base of the right major fissure is no longer seen.    Decreased left pleural effusion with residual left lower/retrocardiac   opacity on that image. The residualopacities may correspond to loculated   left pleural fluid with associated passive atelectasis. Atelectasis of   other cause and/or other pathology including, but not limited to,   pneumonia is not excluded.    Continued left midlung linear atelectasis versus pleural fluid extending   into the left major fissure.    --- End of Report ---            ABBI SALVADOR MD; Attending Radiologist  This document has been electronically signed. Zack 10 2025 12:15PM    < end of copied text >    ECHO:    IMPRESSION: 54y Male PAST MEDICAL & SURGICAL HISTORY:  HTN (hypertension)      History of prediabetes      Hyperlipidemia      Cigarette smoker      Malignant neoplasm of body of stomach      No significant past surgical history       Impression: This is a 55 Y/O male with  CA gastric w/ mets on FLOT chemo s/p J tube removal. Presented to ED with 3 weeks of shortness of breath. Prior to symptoms  3 weeks ago was able to walk 10 blocks . For pulmonary follow up of Acute hypoxic respiratory failure secondary to B/L pleural Effusions.     Suggestion:  O2 saturation 96% room air. So far saturating good room air.    Continue Duoneb via nebulization Q 6 Hours.  Continue Advair 250-50 mcg Twice daily.   No need to drain/ tap Bilateral Pleural Effusions at this time ( not enough fluid pocket to drain ).   On lasix 40 mg IVP twice daily.    DVT / GI prophylactic. On lovenox 40 mg SQ daily.

## 2025-01-13 NOTE — PROGRESS NOTE ADULT - SUBJECTIVE AND OBJECTIVE BOX
PATIENT SEEN AND EXAMINED BY QUIANA CHAWLA M.D. ON :- 1/13/25  DATE OF SERVICE:1/13/25             Interim events noted,Labs ,Radiological studies and Cardiology tests reviewed .    Patient is a 54y old  Male who presents with a chief complaint of Pleural effusion (13 Jan 2025 17:59)      HPI:  Patient is a 54M, Pmhx of CA gastric w/ mets on FLOT chemo s/p J tube removal, HTN. Patient presented with 3 weeks of shortness of breath for 3 week he is currently able to walk 1 block without feeling sob prior to the onset of symptoms he was able to walk 10 blocks. It is associated with palpitations on walking. He has progressive bilateral leg swelling. He noticed mild abdominal distension. He denies chest pain, orthopnea, pnd, lightheadedness, cough, rhinorrhea,  facial pain, n/v/d/c.     Patient's recent chemo session was 11/16 with Dr. Palma at [facility]. He finished 4 sessions so far. (07 Jan 2025 19:37)      PAST MEDICAL & SURGICAL HISTORY:  HTN (hypertension)      History of prediabetes      Hyperlipidemia      Cigarette smoker      Malignant neoplasm of body of stomach      No significant past surgical history          PREVIOUS DIAGNOSTIC TESTING:      ECHO  FINDINGS:    STRESS  FINDINGS:    CATHETERIZATION  FINDINGS:    MEDICATIONS  (STANDING):  albuterol/ipratropium for Nebulization 3 milliLiter(s) Nebulizer every 6 hours  enoxaparin Injectable 40 milliGRAM(s) SubCutaneous every 24 hours  fluticasone propionate/ salmeterol 250-50 MICROgram(s) Diskus 1 Dose(s) Inhalation two times a day  furosemide   Injectable 40 milliGRAM(s) IV Push two times a day  pantoprazole    Tablet 40 milliGRAM(s) Oral before breakfast    MEDICATIONS  (PRN):  acetaminophen     Tablet .. 650 milliGRAM(s) Oral every 6 hours PRN Temp greater or equal to 38C (100.4F), Mild Pain (1 - 3)  aluminum hydroxide/magnesium hydroxide/simethicone Suspension 30 milliLiter(s) Oral every 4 hours PRN Dyspepsia  melatonin 3 milliGRAM(s) Oral at bedtime PRN Insomnia  ondansetron Injectable 4 milliGRAM(s) IV Push every 8 hours PRN Nausea and/or Vomiting      FAMILY HISTORY:  No pertinent family history in first degree relatives        SOCIAL HISTORY:    CIGARETTES:    ALCOHOL:    REVIEW OF SYSTEMS:  CONSTITUTIONAL: No fever, weight loss, or fatigue  EYES: No eye pain, visual disturbances, or discharge  ENMT:  No difficulty hearing, tinnitus, vertigo; No sinus or throat pain  NECK: No pain or stiffness  RESPIRATORY: No cough, wheezing, chills or hemoptysis; No shortness of breath  CARDIOVASCULAR: No chest pain, palpitations, dizziness, or leg swelling  GASTROINTESTINAL: No abdominal or epigastric pain. No nausea, vomiting, or hematemesis; No diarrhea or constipation. No melena or hematochezia.  GENITOURINARY: No dysuria, frequency, hematuria, or incontinence  NEUROLOGICAL: No headaches, memory loss, loss of strength, numbness, or tremors  SKIN: No itching, burning, rashes, or lesions   LYMPH NODES: No enlarged glands  ENDOCRINE: No heat or cold intolerance; No hair loss  MUSCULOSKELETAL: No joint pain or swelling; No muscle, back, or extremity pain  PSYCHIATRIC: No depression, anxiety, mood swings, or difficulty sleeping  HEME/LYMPH: No easy bruising, or bleeding gums  ALLERY AND IMMUNOLOGIC: No hives or eczema    Vital Signs Last 24 Hrs  T(C): 36.4 (13 Jan 2025 21:01), Max: 37.2 (13 Jan 2025 05:14)  T(F): 97.5 (13 Jan 2025 21:01), Max: 99 (13 Jan 2025 05:14)  HR: 110 (13 Jan 2025 21:01) (99 - 129)  BP: 98/63 (13 Jan 2025 21:01) (98/63 - 106/57)  BP(mean): 77 (13 Jan 2025 12:42) (77 - 77)  RR: 17 (13 Jan 2025 21:01) (16 - 18)  SpO2: 96% (13 Jan 2025 21:01) (92% - 96%)    Parameters below as of 13 Jan 2025 21:01  Patient On (Oxygen Delivery Method): room air          PHYSICAL EXAM:  GENERAL: NAD, well-groomed, well-developed  HEAD:  Atraumatic, Normocephalic  EYES: EOMI, PERRLA, conjunctiva and sclera clear  ENMT: No tonsillar erythema, exudates, or enlargement; Moist mucous membranes, Good dentition, No lesions  NECK: Supple, No JVD, Normal thyroid  NERVOUS SYSTEM:  Alert & Oriented X3, Good concentration; Motor Strength 5/5 B/L upper and lower extremities; DTRs 2+ intact and symmetric  CHEST/LUNG: Clear to percussion bilaterally; No rales, rhonchi, wheezing, or rubs  HEART: Regular rate and rhythm; No murmurs, rubs, or gallops  ABDOMEN: Soft, Nontender, Nondistended; Bowel sounds present  EXTREMITIES:  2+ Peripheral Pulses, No clubbing, cyanosis, or edema  LYMPH: No lymphadenopathy noted  SKIN: No rashes or lesions      INTERPRETATION OF TELEMETRY:    [facility]    [facility]     LABS:                        10.6   11.11 )-----------( 118      ( 13 Jan 2025 05:44 )             31.0     01-13    139  |  106  |  18  ----------------------------<  122[H]  4.1   |  27  |  0.59    Ca    8.4      13 Jan 2025 05:44  Phos  3.9     01-13  Mg     2.0     01-13    TPro  4.8[L]  /  Alb  1.9[L]  /  TBili  0.6  /  DBili  x   /  AST  52[H]  /  ALT  31  /  AlkPhos  131[H]  01-13          Urinalysis Basic - ( 13 Jan 2025 05:44 )    Color: x / Appearance: x / SG: x / pH: x  Gluc: 122 mg/dL / Ketone: x  / Bili: x / Urobili: x   Blood: x / Protein: x / Nitrite: x   Leuk Esterase: x / RBC: x / WBC x   Sq Epi: x / Non Sq Epi: x / Bacteria: x      Lipid Panel:   I&O's Summary    12 Jan 2025 07:01  -  13 Jan 2025 07:00  --------------------------------------------------------  IN: 0 mL / OUT: 400 mL / NET: -400 mL        RADIOLOGY & ADDITIONAL STUDIES:    < from: TTE W or WO Ultrasound Enhancing Agent (01.10.25 @ 09:54) >  CONCLUSIONS:      1. Left ventricular cavity is normal in size. Left ventricular wall thickness is normal. Left ventricular systolic function is hyperdynamic with an ejection fraction of 73 % by Zuniga's method of disks. There are no regional wall motion abnormalities seen.   2. Normal right ventricular cavity size and normal right ventricular systolic function.   3. Trileaflet aortic valve. Fibrocalcific aortic valve sclerosis without stenosis.   4. No evidence of aortic regurgitation.   5. Systolic anterior motion of the mitral valve with an increased left ventricular outflow tract gradient indicative of mild left ventricular outflow tract obstruction.   6. Atleast mild mitral regurgitation. The mitral regurgitant jet is eccentric jet, severity may be underestimated.   7. Moderate pulmonary hypertension.   8. Left pleural effusion noted.   9. No pericardial effusion seen.  10. No prior echocardiogram is available for comparison.    < end of copied text >

## 2025-01-13 NOTE — PROGRESS NOTE ADULT - SUBJECTIVE AND OBJECTIVE BOX
Patient is a 54y old  Male who presents with a chief complaint of Pleural effusion (13 Jan 2025       INTERVAL HPI/OVERNIGHT EVENTS: pt seen and examined    MEDICATIONS  (STANDING):  albuterol/ipratropium for Nebulization 3 milliLiter(s) Nebulizer every 6 hours  enoxaparin Injectable 40 milliGRAM(s) SubCutaneous every 24 hours  fluticasone propionate/ salmeterol 250-50 MICROgram(s) Diskus 1 Dose(s) Inhalation two times a day  furosemide   Injectable 40 milliGRAM(s) IV Push two times a day  pantoprazole    Tablet 40 milliGRAM(s) Oral before breakfast    MEDICATIONS  (PRN):  acetaminophen     Tablet .. 650 milliGRAM(s) Oral every 6 hours PRN Temp greater or equal to 38C (100.4F), Mild Pain (1 - 3)  aluminum hydroxide/magnesium hydroxide/simethicone Suspension 30 milliLiter(s) Oral every 4 hours PRN Dyspepsia  melatonin 3 milliGRAM(s) Oral at bedtime PRN Insomnia  ondansetron Injectable 4 milliGRAM(s) IV Push every 8 hours PRN Nausea and/or Vomiting      __________________________________________________  REVIEW OF SYSTEMS:    CONSTITUTIONAL: No fever,   EYES: no acute visual disturbances  NECK: No pain or stiffness  RESPIRATORY: No cough; No shortness of breath  CARDIOVASCULAR: No chest pain, no palpitations  GASTROINTESTINAL: No pain. No nausea or vomiting; No diarrhea   NEUROLOGICAL: No headache or numbness, no tremors  MUSCULOSKELETAL: No joint pain, no muscle pain  GENITOURINARY: no dysuria, no frequency, no hesitancy  PSYCHIATRY: no depression , no anxiety  ALL OTHER  ROS negative        Vital Signs Last 24 Hrs  T(C): 36.5 (13 Jan 2025 12:42), Max: 37.2 (12 Jan 2025 21:39)  T(F): 97.7 (13 Jan 2025 12:42), Max: 99 (12 Jan 2025 21:39)  HR: 99 (13 Jan 2025 12:42) (99 - 129)  BP: 103/65 (13 Jan 2025 12:42) (103/65 - 107/62)  BP(mean): 77 (13 Jan 2025 12:42) (77 - 77)  RR: 18 (13 Jan 2025 12:42) (16 - 18)  SpO2: 94% (13 Jan 2025 12:42) (92% - 94%)    Parameters below as of 13 Jan 2025 12:42  Patient On (Oxygen Delivery Method): room air        ________________________________________________  PHYSICAL EXAM:  GENERAL: NAD  HEENT: Normocephalic;  conjunctivae and sclerae clear; moist mucous membranes;   NECK : supple  CHEST/LUNG: dec breath sounds at bases  cvs- s1, s2+  ABDOMEN: Soft, Nontender, Nondistended; Bowel sounds present  EXTREMITIES: no cyanosis; no edema; no calf tenderness  SKIN: warm and dry; no rash  NERVOUS SYSTEM:  Awake and alert;     _________________________________________________  LABS:                        10.6   11.11 )-----------( 118      ( 13 Jan 2025 05:44 )             31.0     01-13    139  |  106  |  18  ----------------------------<  122[H]  4.1   |  27  |  0.59    Ca    8.4      13 Jan 2025 05:44  Phos  3.9     01-13  Mg     2.0     01-13    TPro  4.8[L]  /  Alb  1.9[L]  /  TBili  0.6  /  DBili  x   /  AST  52[H]  /  ALT  31  /  AlkPhos  131[H]  01-13      Urinalysis Basic - ( 13 Jan 2025 05:44 )    Color: x / Appearance: x / SG: x / pH: x  Gluc: 122 mg/dL / Ketone: x  / Bili: x / Urobili: x   Blood: x / Protein: x / Nitrite: x   Leuk Esterase: x / RBC: x / WBC x   Sq Epi: x / Non Sq Epi: x / Bacteria: x      CAPILLARY BLOOD GLUCOSE    RADIOLOGY & ADDITIONAL TESTS:      < from: Xray Chest 1 View- PORTABLE-Routine (Xray Chest 1 View- PORTABLE-Routine in AM.) (01.10.25 @ 09:31) >    ACC: [id number] EXAM:  XR CHEST PORTABLE ROUTINE 1V   ORDERED BY: SALBADOR BROWNE     ACC: [id number] EXAM:  XR CHEST PORTABLE ROUTINE 1V   ORDERED BY:  CLIVE KAHN     PROCEDURE DATE:  01/09/2025          INTERPRETATION:  TIME OF EXAM: January 9, 2025 at 9:00 AM.    CLINICAL INFORMATION: Bilateral pleural effusion.    COMPARISON:  CTA of the chest from January 7, 2025.    TECHNIQUE:   AP Portable chest x-ray.    INTERPRETATION:    Heart size and the mediastinum cannot be accurately evaluated on this   projection. Calcified thoracic aorta.  There is a CT injectable right IJ approach port with tip at the right   atrial/IVC junction.  Calcified right upper lobe granuloma.  There is left midlung linear atelectasis versus extension of pleural   fluid into the left major fissure.  There are bilateral small pleural effusions, larger on the left, with   likely associated passive atelectasis. The right-sided effusion extends   into the base of the right major fissure.  No pneumothorax is seen.  There is no acute bony abnormality.    AP portable chest x-ray from January 10, 2025 at 9:06 AM:    CLINICAL INFORMATION: Pleural effusion.    INTERPRETATION:    There is a small right pleural effusion. Previous extension into the base   of themajor fissure is no longer apparent.  Decreased left pleural effusion with residual left lower/retrocardiac   opacity seen. Continued left midlung linear atelectasis versus pleural   fluid extending into the left major fissure.  No pneumothorax.      IMPRESSION:  Small right pleural effusion on the most recent image.   Extension into the base of the right major fissure is no longer seen.    Decreased left pleural effusion with residual left lower/retrocardiac   opacity on that image. The residualopacities may correspond to loculated   left pleural fluid with associated passive atelectasis. Atelectasis of   other cause and/or other pathology including, but not limited to,   pneumonia is not excluded.    Continued left midlung linear atelectasis versus pleural fluid extending   into the left major fissure.    --- End of Report ---    < end of copied text >      < from: CT Angio Chest PE Protocol w/ IV Cont (01.07.25 @ 13:05) >    ACC: [id number] EXAM:  CT ANGIO CHEST PULM ART [facility]   ORDERED BY:    KAM ZAMARRIPA     PROCEDURE DATE:  01/07/2025          INTERPRETATION:  CLINICAL INDICATION: Dyspnea, gastric cancer    TECHNIQUE: A volumetric acquisition of the chest was obtained from the   thoracic inlet to the upper abdomen during dynamic administration of   intravenous contrast according to the PE protocol. 3D MIP images were   provided.    CONTRAST/COMPLICATIONS:  IV Contrast: Omnipaque 350  60 cc administered   40 cc discarded  Oral Contrast: NONE  Complications: .    COMPARISON: CT abdomen 12/24/2024.      FINDINGS:  Pulmonary Artery: The study is technically adequate with a good contrast   bolus to the pulmonary arteries. There are no filling defects in the  pulmonary artery or its branches.    Aorta: Normal in course and caliber.    Cardiac: The heart is normal in size. No pericardial effusion.  Right   sided central venous access port with the tip terminating in the right   atrium.    Lungs/Airways/Pleura: Patent central airways. Moderate bilateral pleural   effusions with adjacent compressive atelectasis have increased since   December 24, 2024. Emphysema.  Scattered calcified granulomas.    Mediastinum/Lymph nodes: No thoracic adenopathy.    Upper Abdomen: Evaluation of the partially imaged upper abdomen   demonstrates ascites.    Bones and Soft Tissues: No acute osseous abnormality.      IMPRESSION:  No evidence of pulmonary embolism.    Moderate bilateral pleural effusions with adjacent compressive   atelectasis have increased since December 24, 2024.    --- End of Report ---    < end of copied text >      Imaging Personally Reviewed:  YES/NO    Consultant(s) Notes Reviewed:   YES/ No    Care Discussed with Consultants :     Plan of care was discussed with patient and /or primary care giver; all questions and concerns were addressed and care was aligned with patient's wishes.

## 2025-01-13 NOTE — PROGRESS NOTE ADULT - NS ATTEND AMEND GEN_ALL_CORE FT
Impression: This is a 53 Y/O male with  CA gastric w/ mets on FLOT chemo s/p J tube removal. Presented to ED with 3 weeks of shortness of breath. Prior to symptoms  3 weeks ago was able to walk 10 blocks . For pulmonary follow up of Acute hypoxic respiratory failure secondary to B/L pleural Effusions.     Suggestion:  O2 saturation 96% room air. So far saturating good room air.    Continue Duoneb via nebulization Q 6 Hours.  Continue Advair 250-50 mcg Twice daily.   No need to drain/ tap Bilateral Pleural Effusions at this time ( not enough fluid pocket to drain ).   On lasix 40 mg IVP twice daily.    DVT / GI prophylactic. On lovenox 40 mg SQ daily.
Impression: This is a 55 Y/O male with  CA gastric w/ mets on FLOT chemo s/p J tube removal. Presented to ED with 3 weeks of shortness of breath. Prior to symptoms  3 weeks ago was able to walk 10 blocks . For pulmonary follow up of Acute hypoxic respiratory failure secondary to B/L pleural Effusions.     Suggestion:  O2 saturation 94% room air. Using Oxygen supplementation on and off but appears comfortable on sitting position saturating room air. Monitor O2 saturation trend room air.   Continue Duoneb via nebulization Q 6 Hours.  Continue Advair 250-50 mcg Twice daily.   No need to drain/ tap Bilateral Pleural Effusions at this time ( not enough fluid pocket to drain ).   On lasix 40 mg IVP twice daily.    DVT / GI prophylactic. On lovenox 40 mg SQ daily.

## 2025-01-14 LAB
ANION GAP SERPL CALC-SCNC: 8 MMOL/L — SIGNIFICANT CHANGE UP (ref 5–17)
BUN SERPL-MCNC: 17 MG/DL — SIGNIFICANT CHANGE UP (ref 7–18)
CALCIUM SERPL-MCNC: 8.3 MG/DL — LOW (ref 8.4–10.5)
CHLORIDE SERPL-SCNC: 104 MMOL/L — SIGNIFICANT CHANGE UP (ref 96–108)
CO2 SERPL-SCNC: 27 MMOL/L — SIGNIFICANT CHANGE UP (ref 22–31)
CREAT SERPL-MCNC: 0.55 MG/DL — SIGNIFICANT CHANGE UP (ref 0.5–1.3)
EGFR: 118 ML/MIN/1.73M2 — SIGNIFICANT CHANGE UP
GLUCOSE SERPL-MCNC: 112 MG/DL — HIGH (ref 70–99)
HCT VFR BLD CALC: 31 % — LOW (ref 39–50)
HGB BLD-MCNC: 10.4 G/DL — LOW (ref 13–17)
MCHC RBC-ENTMCNC: 30.9 PG — SIGNIFICANT CHANGE UP (ref 27–34)
MCHC RBC-ENTMCNC: 33.5 G/DL — SIGNIFICANT CHANGE UP (ref 32–36)
MCV RBC AUTO: 92 FL — SIGNIFICANT CHANGE UP (ref 80–100)
NRBC # BLD: 0 /100 WBCS — SIGNIFICANT CHANGE UP (ref 0–0)
PLATELET # BLD AUTO: 121 K/UL — LOW (ref 150–400)
POTASSIUM SERPL-MCNC: 3.4 MMOL/L — LOW (ref 3.5–5.3)
POTASSIUM SERPL-SCNC: 3.4 MMOL/L — LOW (ref 3.5–5.3)
RBC # BLD: 3.37 M/UL — LOW (ref 4.2–5.8)
RBC # FLD: 17.2 % — HIGH (ref 10.3–14.5)
SODIUM SERPL-SCNC: 139 MMOL/L — SIGNIFICANT CHANGE UP (ref 135–145)
WBC # BLD: 9.24 K/UL — SIGNIFICANT CHANGE UP (ref 3.8–10.5)
WBC # FLD AUTO: 9.24 K/UL — SIGNIFICANT CHANGE UP (ref 3.8–10.5)

## 2025-01-14 RX ORDER — POTASSIUM CHLORIDE 600 MG/1
40 TABLET, FILM COATED, EXTENDED RELEASE ORAL ONCE
Refills: 0 | Status: COMPLETED | OUTPATIENT
Start: 2025-01-14 | End: 2025-01-14

## 2025-01-14 RX ADMIN — Medication 40 MILLIGRAM(S): at 14:36

## 2025-01-14 RX ADMIN — PANTOPRAZOLE 40 MILLIGRAM(S): 40 TABLET, DELAYED RELEASE ORAL at 06:03

## 2025-01-14 RX ADMIN — IPRATROPIUM BROMIDE AND ALBUTEROL SULFATE 3 MILLILITER(S): .5; 2.5 SOLUTION RESPIRATORY (INHALATION) at 08:31

## 2025-01-14 RX ADMIN — Medication 40 MILLIGRAM(S): at 05:37

## 2025-01-14 RX ADMIN — FLUTICASONE PROPIONATE AND SALMETEROL 1 DOSE(S): 50; 500 POWDER ORAL; RESPIRATORY (INHALATION) at 22:41

## 2025-01-14 RX ADMIN — POTASSIUM CHLORIDE 40 MILLIEQUIVALENT(S): 600 TABLET, FILM COATED, EXTENDED RELEASE ORAL at 08:22

## 2025-01-14 RX ADMIN — IPRATROPIUM BROMIDE AND ALBUTEROL SULFATE 3 MILLILITER(S): .5; 2.5 SOLUTION RESPIRATORY (INHALATION) at 15:00

## 2025-01-14 RX ADMIN — IPRATROPIUM BROMIDE AND ALBUTEROL SULFATE 3 MILLILITER(S): .5; 2.5 SOLUTION RESPIRATORY (INHALATION) at 20:24

## 2025-01-14 RX ADMIN — ENOXAPARIN SODIUM 40 MILLIGRAM(S): 60 INJECTION INTRAVENOUS; SUBCUTANEOUS at 05:37

## 2025-01-14 RX ADMIN — FLUTICASONE PROPIONATE AND SALMETEROL 1 DOSE(S): 50; 500 POWDER ORAL; RESPIRATORY (INHALATION) at 11:44

## 2025-01-14 NOTE — CONSULT NOTE ADULT - ASSESSMENT
Patient is a 54M, Pmhx of CA gastric w/ mets on FOLFOX then  FLOT chemo s/p J tube removal, HTN. Patient presented with 3 weeks of shortness of breath for 3 week he is currently able to walk 1 block without feeling sob prior to the onset of symptoms he was able to walk 10 blocks. It is associated with palpitations on walking. He has progressive bilateral leg swelling. He noticed mild abdominal distension. He denies chest pain, orthopnea, pnd, lightheadedness, cough, rhinorrhea,  facial pain, n/v/d/c.  < from: CT Angio Chest PE Protocol w/ IV Cont   No evidence of pulmonary embolism. Moderate bilateral pleural effusions with adjacent compressive atelectasis have increased since December 24, 2024      Patient's recent chemo session was 11/16 with Dr. Palma at [facility]. He finished 4 sessions of FLOT so far. (07 Jan 2025 19:37)    Problem # 1 Gastric Cancer likely locally metastatic ? Pleural fluid metatases  -If possible to undergo diagnostic thoracentesis   -Discussed with Dr. Palma and there is further plans for chemotherapy   -Recommend call Dr. David for surgical guidance for possible surgery   -Recommend Palliative care consult to discuss goals if surgery not an option and fluid is malignant.    Thank you for the consult. For questions please call [phone]    Husam COLINDRES      
54 year old, M, w/ PMH of CA gastric w/ mets on FLOT chemo s/p J tube removal, HTN. Patient presented with 3 weeks of shortness of breath for 3 week he is currently able to walk 1 block without feeling sob prior to the onset of symptoms he was able to walk 10 blocks. It is associated with palpitations on walking. He has progressive bilateral leg swelling. He noticed mild abdominal distension.  Admitted for management of pleural effusion likely 2/2 Malignancy.      #  Hypokalemia.   # pleural effusion   # CA gastric w/ mets   # HTN  - Likely 2/2 malignancy   - P/w Dyspnea on exertion, palpitation on exertion and chronic LE grade 3 pitting pedal edema for the past 3 weeks.  - Pt started on NC  - S/p CXR and CTA Chest showed progressive moderately sized pleural effusions, no PE  - C/w Lasix 40mg IV BID   pulm f/u   
54M with hx of metastatic gastric ca, patient with mucinous primary with associated implants within the abdomen.  Patient currently admitted and recovering well from pleural effusion.  Seen by oncology, planned for outpatient follow up.   -No acute surgical intervention  -Patient should follow up with Dr. David within 2 weeks of discharge to discuss additional potential management  -appreciate medical oncology recs  -rest of management per primary team    Surgical oncology will sign off for now, reconsult prn    Case discussed with Surgical Oncology Attending, Dr. David

## 2025-01-14 NOTE — CONSULT NOTE ADULT - SUBJECTIVE AND OBJECTIVE BOX
Surgical Oncology Consult Note:   HPI:  Patient is a 54M, Pmhx of CA gastric w/ mets on FLOT chemo s/p J tube removal, HTN. Patient presented with 3 weeks of shortness of breath for 3 week he is currently able to walk 1 block without feeling sob prior to the onset of symptoms he was able to walk 10 blocks. It is associated with palpitations on walking. He has progressive bilateral leg swelling. He noticed mild abdominal distension. He denies chest pain, orthopnea, pnd, lightheadedness, cough, rhinorrhea,  facial pain, n/v/d/c.     Patient's recent chemo session was 11/16 with Dr. Palma at [facility]. He finished 4 sessions so far. (07 Jan 2025 19:37)        Surgical Oncology History:   Patient seen and examined at the bedside. Patient is s/p aborted gastrectomy for advanced gastric cancer with ommental and colonic mets, mucinous ascites.  s/p J tube placement, subsequently removed.  Patient has been undergoing active chemo at [facility], currently on hold per daughter and awaiting discussion of most recent imaging.  Patient admitted for pleural effusion currently, which is improved, no intervention planned per primary team/pulmonology.  Patient currently hemdynamically stable, tolerating PO intake, having bowel function.  Abdominal exam benign.      Vital Signs Last 24 Hrs  T(C): 36.7 (14 Jan 2025 12:59), Max: 37 (14 Jan 2025 05:06)  T(F): 98 (14 Jan 2025 12:59), Max: 98.6 (14 Jan 2025 05:06)  HR: 91 (14 Jan 2025 12:59) (91 - 110)  BP: 112/71 (14 Jan 2025 12:59) (98/63 - 112/71)  BP(mean): --  RR: 17 (14 Jan 2025 12:59) (17 - 18)  SpO2: 95% (14 Jan 2025 12:59) (93% - 96%)    Parameters below as of 14 Jan 2025 12:59  Patient On (Oxygen Delivery Method): room air    Physical:   GA: AAOx3, NAD  CVS: RRR  Pulm: nonlabored  Abd: soft, nontender, nondistended. no ecchymosis or abrasions.                            10.4   9.24  )-----------( 121      ( 14 Jan 2025 05:13 )             31.0   01-14    139  |  104  |  17  ----------------------------<  112[H]  3.4[L]   |  27  |  0.55    Ca    8.3[L]      14 Jan 2025 05:13  Phos  3.9     01-13  Mg     2.0     01-13    TPro  4.8[L]  /  Alb  1.9[L]  /  TBili  0.6  /  DBili  x   /  AST  52[H]  /  ALT  31  /  AlkPhos  131[H]  01-13

## 2025-01-14 NOTE — PROGRESS NOTE ADULT - SUBJECTIVE AND OBJECTIVE BOX
Time of Visit:  Patient seen and examined.     MEDICATIONS  (STANDING):  albuterol/ipratropium for Nebulization 3 milliLiter(s) Nebulizer every 6 hours  enoxaparin Injectable 40 milliGRAM(s) SubCutaneous every 24 hours  fluticasone propionate/ salmeterol 250-50 MICROgram(s) Diskus 1 Dose(s) Inhalation two times a day  furosemide   Injectable 40 milliGRAM(s) IV Push two times a day  pantoprazole    Tablet 40 milliGRAM(s) Oral before breakfast      MEDICATIONS  (PRN):  acetaminophen     Tablet .. 650 milliGRAM(s) Oral every 6 hours PRN Temp greater or equal to 38C (100.4F), Mild Pain (1 - 3)  aluminum hydroxide/magnesium hydroxide/simethicone Suspension 30 milliLiter(s) Oral every 4 hours PRN Dyspepsia  melatonin 3 milliGRAM(s) Oral at bedtime PRN Insomnia  ondansetron Injectable 4 milliGRAM(s) IV Push every 8 hours PRN Nausea and/or Vomiting       Medications up to date at time of exam.    ROS; No fever, chills, cough, congestion on exam.   PHYSICAL EXAMINATION:    Vital Signs Last 24 Hrs  T(C): 36.7 (14 Jan 2025 12:59), Max: 37 (14 Jan 2025 05:06)  T(F): 98 (14 Jan 2025 12:59), Max: 98.6 (14 Jan 2025 05:06)  HR: 91 (14 Jan 2025 12:59) (91 - 110)  BP: 112/71 (14 Jan 2025 12:59) (98/63 - 112/71)  BP(mean): --  RR: 17 (14 Jan 2025 12:59) (17 - 18)  SpO2: 95% (14 Jan 2025 12:59) (93% - 96%)    Parameters below as of 14 Jan 2025 12:59  Patient On (Oxygen Delivery Method): room air       General: Alert and oriented. Able to answer question with no SOB. No acute distress.     HEENT: Head is normocephalic and atraumatic. No nasal tenderness . Extraocular muscles are intact. Mucous membranes are moist.     NECK: Supple, no palpable adenopathy.    LUNGS: Non labored. No wheezing. No use of accessory muscle.     HEART: S1 S2 Regular rate and rhythm . No JVD.     ABDOMEN: Soft, nontender, and nondistended. Active bowel sounds.     EXTREMITIES: Without any cyanosis, clubbing, rash, lesions .    NEUROLOGIC: Awake, alert, oriented.     SKIN: Warm, dry, good turgor.      LABS:                        10.4   9.24  )-----------( 121      ( 14 Jan 2025 05:13 )             31.0     01-14    139  |  104  |  17  ----------------------------<  112[H]  3.4[L]   |  27  |  0.55    Ca    8.3[L]      14 Jan 2025 05:13  Phos  3.9     01-13  Mg     2.0     01-13    TPro  4.8[L]  /  Alb  1.9[L]  /  TBili  0.6  /  DBili  x   /  AST  52[H]  /  ALT  31  /  AlkPhos  131[H]  01-13      Urinalysis Basic - ( 14 Jan 2025 05:13 )    Color: x / Appearance: x / SG: x / pH: x  Gluc: 112 mg/dL / Ketone: x  / Bili: x / Urobili: x   Blood: x / Protein: x / Nitrite: x   Leuk Esterase: x / RBC: x / WBC x   Sq Epi: x / Non Sq Epi: x / Bacteria: x  MICROBIOLOGY: (if applicable)    RADIOLOGY & ADDITIONAL STUDIES:  EKG:   CXR: < from: Xray Chest 1 View- PORTABLE-Routine (Xray Chest 1 View- PORTABLE-Routine in AM.) (01.10.25 @ 09:31) >    ACC: [id number] EXAM:  XR CHEST PORTABLE ROUTINE 1V   ORDERED BY: SALBADOR BROWNE     ACC: [id number] EXAM:  XR CHEST PORTABLE ROUTINE 1V   ORDERED BY:  CLIVE KAHN     PROCEDURE DATE:  01/09/2025          INTERPRETATION:  TIME OF EXAM: January 9, 2025 at 9:00 AM.    CLINICAL INFORMATION: Bilateral pleural effusion.    COMPARISON:  CTA of the chest from January 7, 2025.    TECHNIQUE:   AP Portable chest x-ray.    INTERPRETATION:    Heart size and the mediastinum cannot be accurately evaluated on this   projection. Calcified thoracic aorta.  There is a CT injectable right IJ approach port with tip at the right   atrial/IVC junction.  Calcified right upper lobe granuloma.  There is left midlung linear atelectasis versus extension of pleural   fluid into the left major fissure.  There are bilateral small pleural effusions, larger on the left, with   likely associated passive atelectasis. The right-sided effusion extends   into the base of the right major fissure.  No pneumothorax is seen.  There is no acute bony abnormality.    AP portable chest x-ray from January 10, 2025 at 9:06 AM:    CLINICAL INFORMATION: Pleural effusion.    INTERPRETATION:    There is a small right pleural effusion. Previous extension into the base   of themajor fissure is no longer apparent.  Decreased left pleural effusion with residual left lower/retrocardiac   opacity seen. Continued left midlung linear atelectasis versus pleural   fluid extending into the left major fissure.  No pneumothorax.      IMPRESSION:  Small right pleural effusion on the most recent image.   Extension into the base of the right major fissure is no longer seen.    Decreased left pleural effusion with residual left lower/retrocardiac   opacity on that image. The residualopacities may correspond to loculated   left pleural fluid with associated passive atelectasis. Atelectasis of   other cause and/or other pathology including, but not limited to,   pneumonia is not excluded.    Continued left midlung linear atelectasis versus pleural fluid extending   into the left major fissure.    --- End of Report ---            ABBI SALVADOR MD; Attending Radiologist  This document has been electronically signed. Zack 10 2025 12:15PM    < end of copied text >    ECHO:    IMPRESSION: 54y Male PAST MEDICAL & SURGICAL HISTORY:  HTN (hypertension)      History of prediabetes      Hyperlipidemia      Cigarette smoker      Malignant neoplasm of body of stomach      No significant past surgical history       Impression: This is a 55 Y/O male with  CA gastric w/ mets on FLOT chemo s/p J tube removal. Presented to ED with 3 weeks of shortness of breath. Prior to symptoms  3 weeks ago was able to walk 10 blocks . For pulmonary follow up of Acute hypoxic respiratory failure secondary to B/L pleural Effusions.     Suggestion:  O2 saturation 97% room air. So far saturating good room air.    Continue Duoneb via nebulization Q 6 Hours.  Continue Advair 250-50 mcg Twice daily.   No need to drain/ tap Bilateral Pleural Effusions at this time ( not enough fluid pocket to drain ).   On lasix 40 mg IVP twice daily.    DVT / GI prophylactic. On lovenox 40 mg SQ daily.

## 2025-01-14 NOTE — PROGRESS NOTE ADULT - SUBJECTIVE AND OBJECTIVE BOX
Patient is a 54y old  Male who presents with a chief complaint of Pleural effusion (14 Jan 2025 16:50).  Pt. seen at bedside, appears comfortable with no c/o discomfort.  Discussed with pt. and his niece plan of care.        INTERVAL HPI/OVERNIGHT EVENTS: no new complaints    MEDICATIONS  (STANDING):  albuterol/ipratropium for Nebulization 3 milliLiter(s) Nebulizer every 6 hours  enoxaparin Injectable 40 milliGRAM(s) SubCutaneous every 24 hours  fluticasone propionate/ salmeterol 250-50 MICROgram(s) Diskus 1 Dose(s) Inhalation two times a day  furosemide   Injectable 40 milliGRAM(s) IV Push two times a day  pantoprazole    Tablet 40 milliGRAM(s) Oral before breakfast    MEDICATIONS  (PRN):  acetaminophen     Tablet .. 650 milliGRAM(s) Oral every 6 hours PRN Temp greater or equal to 38C (100.4F), Mild Pain (1 - 3)  aluminum hydroxide/magnesium hydroxide/simethicone Suspension 30 milliLiter(s) Oral every 4 hours PRN Dyspepsia  melatonin 3 milliGRAM(s) Oral at bedtime PRN Insomnia  ondansetron Injectable 4 milliGRAM(s) IV Push every 8 hours PRN Nausea and/or Vomiting      __________________________________________________  REVIEW OF SYSTEMS:    CONSTITUTIONAL: No fever,   EYES: no acute visual disturbances  NECK: No pain or stiffness  RESPIRATORY: No cough; No shortness of breath  CARDIOVASCULAR: No chest pain, no palpitations  GASTROINTESTINAL: No pain. No nausea or vomiting; No diarrhea   NEUROLOGICAL: No headache or numbness, no tremors  MUSCULOSKELETAL: No joint pain, no muscle pain  GENITOURINARY: no dysuria, no frequency, no hesitancy  PSYCHIATRY: no depression , no anxiety  ALL OTHER  ROS negative        Vital Signs Last 24 Hrs  T(C): 36.7 (14 Jan 2025 12:59), Max: 37 (14 Jan 2025 05:06)  T(F): 98 (14 Jan 2025 12:59), Max: 98.6 (14 Jan 2025 05:06)  HR: 91 (14 Jan 2025 12:59) (91 - 110)  BP: 112/71 (14 Jan 2025 12:59) (98/63 - 112/71)  BP(mean): --  RR: 17 (14 Jan 2025 12:59) (17 - 18)  SpO2: 95% (14 Jan 2025 12:59) (93% - 96%)    Parameters below as of 14 Jan 2025 12:59  Patient On (Oxygen Delivery Method): room air        ________________________________________________  PHYSICAL EXAM:  Well developed, pleasant  GENERAL: NAD  HEENT: Normocephalic;  conjunctivae and sclerae clear; moist mucous membranes;   NECK : supple  CHEST/LUNG: dec breath sounds at bases  HEART: S1 S2  +  ABDOMEN: Soft, Nontender, Nondistended; Bowel sounds present  EXTREMITIES: no cyanosis; no edema; no calf tenderness  SKIN: warm and dry; no rash  NERVOUS SYSTEM:  Awake and alert; _________________________________________________  LABS:                        10.4   9.24  )-----------( 121      ( 14 Jan 2025 05:13 )             31.0     01-14    139  |  104  |  17  ----------------------------<  112[H]  3.4[L]   |  27  |  0.55    Ca    8.3[L]      14 Jan 2025 05:13  Phos  3.9     01-13  Mg     2.0     01-13    TPro  4.8[L]  /  Alb  1.9[L]  /  TBili  0.6  /  DBili  x   /  AST  52[H]  /  ALT  31  /  AlkPhos  131[H]  01-13      Urinalysis Basic - ( 14 Jan 2025 05:13 )    Color: x / Appearance: x / SG: x / pH: x  Gluc: 112 mg/dL / Ketone: x  / Bili: x / Urobili: x   Blood: x / Protein: x / Nitrite: x   Leuk Esterase: x / RBC: x / WBC x   Sq Epi: x / Non Sq Epi: x / Bacteria: x      CAPILLARY BLOOD GLUCOSE      RADIOLOGY & ADDITIONAL TESTS:    < from: Xray Chest 1 View- PORTABLE-Routine (Xray Chest 1 View- PORTABLE-Routine in AM.) (01.10.25 @ 09:31) >    ACC: [id number] EXAM:  XR CHEST PORTABLE ROUTINE 1V   ORDERED BY: SALBADOR BROWNE     ACC: [id number] EXAM:  XR CHEST PORTABLE ROUTINE 1V   ORDERED BY:  CLIVE KAHN     PROCEDURE DATE:  01/09/2025          INTERPRETATION:  TIME OF EXAM: January 9, 2025 at 9:00 AM.    CLINICAL INFORMATION: Bilateral pleural effusion.    COMPARISON:  CTA of the chest from January 7, 2025.    TECHNIQUE:   AP Portable chest x-ray.    INTERPRETATION:    Heart size and the mediastinum cannot be accurately evaluated on this   projection. Calcified thoracic aorta.  There is a CT injectable right IJ approach port with tip at the right   atrial/IVC junction.  Calcified right upper lobe granuloma.  There is left midlung linear atelectasis versus extension of pleural   fluid into the left major fissure.  There are bilateral small pleural effusions, larger on the left, with   likely associated passive atelectasis. The right-sided effusion extends   into the base of the right major fissure.  No pneumothorax is seen.  There is no acute bony abnormality.    AP portable chest x-ray from January 10, 2025 at 9:06 AM:    CLINICAL INFORMATION: Pleural effusion.    INTERPRETATION:    There is a small right pleural effusion. Previous extension into the base   of themajor fissure is no longer apparent.  Decreased left pleural effusion with residual left lower/retrocardiac   opacity seen. Continued left midlung linear atelectasis versus pleural   fluid extending into the left major fissure.  No pneumothorax.      IMPRESSION:  Small right pleural effusion on the most recent image.   Extension into the base of the right major fissure is no longer seen.    Decreased left pleural effusion with residual left lower/retrocardiac   opacity on that image. The residualopacities may correspond to loculated   left pleural fluid with associated passive atelectasis. Atelectasis of   other cause and/or other pathology including, but not limited to,   pneumonia is not excluded.    Continued left midlung linear atelectasis versus pleural fluid extending   into the left major fissure.    < end of copied text >        < from: TTE W or WO Ultrasound Enhancing Agent (01.10.25 @ 09:54) >   1. Left ventricular cavity is normal in size. Left ventricular wall thickness is normal. Left ventricular systolic function is hyperdynamic with an ejection fraction of 73 % by Zuniga's method of disks. There are no regional wall motion abnormalities seen.   2. Normal right ventricular cavity size and normal right ventricular systolic function.   3. Trileaflet aortic valve. Fibrocalcific aortic valve sclerosis without stenosis.   4. No evidence of aortic regurgitation.   5. Systolic anterior motion of the mitral valve with an increased left ventricular outflow tract gradient indicative of mild left ventricular outflow tract obstruction.   6. Atleast mild mitral regurgitation. The mitral regurgitant jet is eccentric jet, severity may be underestimated.   7. Moderate pulmonary hypertension.   8. Left pleural effusion noted.   9. No pericardial effusion seen.  10. No prior echocardiogram is available for comparison.    < end of copied text >    Imaging Personally Reviewed:  YES/NO    Consultant(s) Notes Reviewed:   YES/ No    Care Discussed with Consultants :     Plan of care was discussed with patient and /or primary care giver; all questions and concerns were addressed and care was aligned with patient's wishes.

## 2025-01-14 NOTE — PROGRESS NOTE ADULT - SUBJECTIVE AND OBJECTIVE BOX
PATIENT SEEN AND EXAMINED BY QUIANA CHAWLA M.D. ON :- 1/14/25  DATE OF SERVICE:      1/14/25       Interim events noted,Labs ,Radiological studies and Cardiology tests reviewed .    Patient is a 54y old  Male who presents with a chief complaint of Pleural effusion (14 Jan 2025 17:18)      HPI:  Patient is a 54M, Pmhx of CA gastric w/ mets on FLOT chemo s/p J tube removal, HTN. Patient presented with 3 weeks of shortness of breath for 3 week he is currently able to walk 1 block without feeling sob prior to the onset of symptoms he was able to walk 10 blocks. It is associated with palpitations on walking. He has progressive bilateral leg swelling. He noticed mild abdominal distension. He denies chest pain, orthopnea, pnd, lightheadedness, cough, rhinorrhea,  facial pain, n/v/d/c.     Patient's recent chemo session was 11/16 with Dr. Palma at [facility]. He finished 4 sessions so far. (07 Jan 2025 19:37)      PAST MEDICAL & SURGICAL HISTORY:  HTN (hypertension)      History of prediabetes      Hyperlipidemia      Cigarette smoker      Malignant neoplasm of body of stomach      No significant past surgical history          PREVIOUS DIAGNOSTIC TESTING:      ECHO  FINDINGS:    STRESS  FINDINGS:    CATHETERIZATION  FINDINGS:    MEDICATIONS  (STANDING):  albuterol/ipratropium for Nebulization 3 milliLiter(s) Nebulizer every 6 hours  enoxaparin Injectable 40 milliGRAM(s) SubCutaneous every 24 hours  fluticasone propionate/ salmeterol 250-50 MICROgram(s) Diskus 1 Dose(s) Inhalation two times a day  furosemide   Injectable 40 milliGRAM(s) IV Push two times a day  pantoprazole    Tablet 40 milliGRAM(s) Oral before breakfast    MEDICATIONS  (PRN):  acetaminophen     Tablet .. 650 milliGRAM(s) Oral every 6 hours PRN Temp greater or equal to 38C (100.4F), Mild Pain (1 - 3)  aluminum hydroxide/magnesium hydroxide/simethicone Suspension 30 milliLiter(s) Oral every 4 hours PRN Dyspepsia  melatonin 3 milliGRAM(s) Oral at bedtime PRN Insomnia  ondansetron Injectable 4 milliGRAM(s) IV Push every 8 hours PRN Nausea and/or Vomiting      FAMILY HISTORY:  No pertinent family history in first degree relatives        SOCIAL HISTORY:    CIGARETTES:    ALCOHOL:    REVIEW OF SYSTEMS:  CONSTITUTIONAL: No fever, weight loss, or fatigue  EYES: No eye pain, visual disturbances, or discharge  ENMT:  No difficulty hearing, tinnitus, vertigo; No sinus or throat pain  NECK: No pain or stiffness  RESPIRATORY: No cough, wheezing, chills or hemoptysis; No shortness of breath  CARDIOVASCULAR: No chest pain, palpitations, dizziness, or leg swelling  GASTROINTESTINAL: No abdominal or epigastric pain. No nausea, vomiting, or hematemesis; No diarrhea or constipation. No melena or hematochezia.  GENITOURINARY: No dysuria, frequency, hematuria, or incontinence  NEUROLOGICAL: No headaches, memory loss, loss of strength, numbness, or tremors  SKIN: No itching, burning, rashes, or lesions   LYMPH NODES: No enlarged glands  ENDOCRINE: No heat or cold intolerance; No hair loss  MUSCULOSKELETAL: No joint pain or swelling; No muscle, back, or extremity pain  PSYCHIATRIC: No depression, anxiety, mood swings, or difficulty sleeping  HEME/LYMPH: No easy bruising, or bleeding gums  ALLERY AND IMMUNOLOGIC: No hives or eczema    Vital Signs Last 24 Hrs  T(C): 37.3 (14 Jan 2025 21:15), Max: 37.3 (14 Jan 2025 21:15)  T(F): 99.1 (14 Jan 2025 21:15), Max: 99.1 (14 Jan 2025 21:15)  HR: 106 (14 Jan 2025 21:15) (91 - 106)  BP: 107/67 (14 Jan 2025 21:15) (106/65 - 112/71)  BP(mean): 78 (14 Jan 2025 21:15) (78 - 78)  RR: 18 (14 Jan 2025 21:15) (17 - 18)  SpO2: 91% (14 Jan 2025 21:15) (91% - 95%)    Parameters below as of 14 Jan 2025 21:15  Patient On (Oxygen Delivery Method): room air          PHYSICAL EXAM:  GENERAL: NAD, well-groomed, well-developed  HEAD:  Atraumatic, Normocephalic  EYES: EOMI, PERRLA, conjunctiva and sclera clear  ENMT: No tonsillar erythema, exudates, or enlargement; Moist mucous membranes, Good dentition, No lesions  NECK: Supple, No JVD, Normal thyroid  NERVOUS SYSTEM:  Alert & Oriented X3, Good concentration; Motor Strength 5/5 B/L upper and lower extremities; DTRs 2+ intact and symmetric  CHEST/LUNG: Clear to percussion bilaterally; No rales, rhonchi, wheezing, or rubs  HEART: Regular rate and rhythm; No murmurs, rubs, or gallops  ABDOMEN: Soft, Nontender, Nondistended; Bowel sounds present  EXTREMITIES:  2+ Peripheral Pulses, No clubbing, cyanosis, or edema  LYMPH: No lymphadenopathy noted  SKIN: No rashes or lesions      INTERPRETATION OF TELEMETRY:    ECG:    [facility]:     LABS:                        10.4   9.24  )-----------( 121      ( 14 Jan 2025 05:13 )             31.0     01-14    139  |  104  |  17  ----------------------------<  112[H]  3.4[L]   |  27  |  0.55    Ca    8.3[L]      14 Jan 2025 05:13  Phos  3.9     01-13  Mg     2.0     01-13    TPro  4.8[L]  /  Alb  1.9[L]  /  TBili  0.6  /  DBili  x   /  AST  52[H]  /  ALT  31  /  AlkPhos  131[H]  01-13          Urinalysis Basic - ( 14 Jan 2025 05:13 )    Color: x / Appearance: x / SG: x / pH: x  Gluc: 112 mg/dL / Ketone: x  / Bili: x / Urobili: x   Blood: x / Protein: x / Nitrite: x   Leuk Esterase: x / RBC: x / WBC x   Sq Epi: x / Non Sq Epi: x / Bacteria: x      Lipid Panel:   I&O's Summary      RADIOLOGY & ADDITIONAL STUDIES:    < from: TTE W or WO Ultrasound Enhancing Agent (01.10.25 @ 09:54) >  CONCLUSIONS:      1. Left ventricular cavity is normal in size. Left ventricular wall thickness is normal. Left ventricular systolic function is hyperdynamic with an ejection fraction of 73 % by Zuniga's method of disks. There are no regional wall motion abnormalities seen.   2. Normal right ventricular cavity size and normal right ventricular systolic function.   3. Trileaflet aortic valve. Fibrocalcific aortic valve sclerosis without stenosis.   4. No evidence of aortic regurgitation.   5. Systolic anterior motion of the mitral valve with an increased left ventricular outflow tract gradient indicative of mild left ventricular outflow tract obstruction.   6. Atleast mild mitral regurgitation. The mitral regurgitant jet is eccentric jet, severity may be underestimated.   7. Moderate pulmonary hypertension.   8. Left pleural effusion noted.   9. No pericardial effusion seen.  10. No prior echocardiogram is available for comparison.    < end of copied text >

## 2025-01-14 NOTE — PROGRESS NOTE ADULT - SUBJECTIVE AND OBJECTIVE BOX
NP Note discussed with  Primary Attending    Patient is a 54y old  Male who presents with a chief complaint of Pleural effusion (14 Jan 2025 16:50).  Pt. seen at bedside, appears comfortable with no c/o discomfort.  Discussed with pt. and his niece plan of care.        INTERVAL HPI/OVERNIGHT EVENTS: no new complaints    MEDICATIONS  (STANDING):  albuterol/ipratropium for Nebulization 3 milliLiter(s) Nebulizer every 6 hours  enoxaparin Injectable 40 milliGRAM(s) SubCutaneous every 24 hours  fluticasone propionate/ salmeterol 250-50 MICROgram(s) Diskus 1 Dose(s) Inhalation two times a day  furosemide   Injectable 40 milliGRAM(s) IV Push two times a day  pantoprazole    Tablet 40 milliGRAM(s) Oral before breakfast    MEDICATIONS  (PRN):  acetaminophen     Tablet .. 650 milliGRAM(s) Oral every 6 hours PRN Temp greater or equal to 38C (100.4F), Mild Pain (1 - 3)  aluminum hydroxide/magnesium hydroxide/simethicone Suspension 30 milliLiter(s) Oral every 4 hours PRN Dyspepsia  melatonin 3 milliGRAM(s) Oral at bedtime PRN Insomnia  ondansetron Injectable 4 milliGRAM(s) IV Push every 8 hours PRN Nausea and/or Vomiting      __________________________________________________  REVIEW OF SYSTEMS:    CONSTITUTIONAL: No fever,   EYES: no acute visual disturbances  NECK: No pain or stiffness  RESPIRATORY: No cough; No shortness of breath  CARDIOVASCULAR: No chest pain, no palpitations  GASTROINTESTINAL: No pain. No nausea or vomiting; No diarrhea   NEUROLOGICAL: No headache or numbness, no tremors  MUSCULOSKELETAL: No joint pain, no muscle pain  GENITOURINARY: no dysuria, no frequency, no hesitancy  PSYCHIATRY: no depression , no anxiety  ALL OTHER  ROS negative        Vital Signs Last 24 Hrs  T(C): 36.7 (14 Jan 2025 12:59), Max: 37 (14 Jan 2025 05:06)  T(F): 98 (14 Jan 2025 12:59), Max: 98.6 (14 Jan 2025 05:06)  HR: 91 (14 Jan 2025 12:59) (91 - 110)  BP: 112/71 (14 Jan 2025 12:59) (98/63 - 112/71)  BP(mean): --  RR: 17 (14 Jan 2025 12:59) (17 - 18)  SpO2: 95% (14 Jan 2025 12:59) (93% - 96%)    Parameters below as of 14 Jan 2025 12:59  Patient On (Oxygen Delivery Method): room air        ________________________________________________  PHYSICAL EXAM:  Well developed, pleasant  GENERAL: NAD  HEENT: Normocephalic;  conjunctivae and sclerae clear; moist mucous membranes;   NECK : supple  CHEST/LUNG: Clear to auscultation bilaterally with good air entry   HEART: S1 S2  regular; no murmurs, gallops or rubs  ABDOMEN: Soft, Nontender, Nondistended; Bowel sounds present  EXTREMITIES: no cyanosis; no edema; no calf tenderness  SKIN: warm and dry; no rash  NERVOUS SYSTEM:  Awake and alert; Oriented  to place, person and time ; no new deficits    _________________________________________________  LABS:                        10.4   9.24  )-----------( 121      ( 14 Jan 2025 05:13 )             31.0     01-14    139  |  104  |  17  ----------------------------<  112[H]  3.4[L]   |  27  |  0.55    Ca    8.3[L]      14 Jan 2025 05:13  Phos  3.9     01-13  Mg     2.0     01-13    TPro  4.8[L]  /  Alb  1.9[L]  /  TBili  0.6  /  DBili  x   /  AST  52[H]  /  ALT  31  /  AlkPhos  131[H]  01-13      Urinalysis Basic - ( 14 Jan 2025 05:13 )    Color: x / Appearance: x / SG: x / pH: x  Gluc: 112 mg/dL / Ketone: x  / Bili: x / Urobili: x   Blood: x / Protein: x / Nitrite: x   Leuk Esterase: x / RBC: x / WBC x   Sq Epi: x / Non Sq Epi: x / Bacteria: x      CAPILLARY BLOOD GLUCOSE      RADIOLOGY & ADDITIONAL TESTS:    < from: Xray Chest 1 View- PORTABLE-Routine (Xray Chest 1 View- PORTABLE-Routine in AM.) (01.10.25 @ 09:31) >    ACC: [id number] EXAM:  XR CHEST PORTABLE ROUTINE 1V   ORDERED BY: SALBADOR BROWNE     ACC: [id number] EXAM:  XR CHEST PORTABLE ROUTINE 1V   ORDERED BY:  CLIVE KAHN     PROCEDURE DATE:  01/09/2025          INTERPRETATION:  TIME OF EXAM: January 9, 2025 at 9:00 AM.    CLINICAL INFORMATION: Bilateral pleural effusion.    COMPARISON:  CTA of the chest from January 7, 2025.    TECHNIQUE:   AP Portable chest x-ray.    INTERPRETATION:    Heart size and the mediastinum cannot be accurately evaluated on this   projection. Calcified thoracic aorta.  There is a CT injectable right IJ approach port with tip at the right   atrial/IVC junction.  Calcified right upper lobe granuloma.  There is left midlung linear atelectasis versus extension of pleural   fluid into the left major fissure.  There are bilateral small pleural effusions, larger on the left, with   likely associated passive atelectasis. The right-sided effusion extends   into the base of the right major fissure.  No pneumothorax is seen.  There is no acute bony abnormality.    AP portable chest x-ray from January 10, 2025 at 9:06 AM:    CLINICAL INFORMATION: Pleural effusion.    INTERPRETATION:    There is a small right pleural effusion. Previous extension into the base   of themajor fissure is no longer apparent.  Decreased left pleural effusion with residual left lower/retrocardiac   opacity seen. Continued left midlung linear atelectasis versus pleural   fluid extending into the left major fissure.  No pneumothorax.      IMPRESSION:  Small right pleural effusion on the most recent image.   Extension into the base of the right major fissure is no longer seen.    Decreased left pleural effusion with residual left lower/retrocardiac   opacity on that image. The residualopacities may correspond to loculated   left pleural fluid with associated passive atelectasis. Atelectasis of   other cause and/or other pathology including, but not limited to,   pneumonia is not excluded.    Continued left midlung linear atelectasis versus pleural fluid extending   into the left major fissure.    < end of copied text >        < from: TTE W or WO Ultrasound Enhancing Agent (01.10.25 @ 09:54) >   1. Left ventricular cavity is normal in size. Left ventricular wall thickness is normal. Left ventricular systolic function is hyperdynamic with an ejection fraction of 73 % by Zuniga's method of disks. There are no regional wall motion abnormalities seen.   2. Normal right ventricular cavity size and normal right ventricular systolic function.   3. Trileaflet aortic valve. Fibrocalcific aortic valve sclerosis without stenosis.   4. No evidence of aortic regurgitation.   5. Systolic anterior motion of the mitral valve with an increased left ventricular outflow tract gradient indicative of mild left ventricular outflow tract obstruction.   6. Atleast mild mitral regurgitation. The mitral regurgitant jet is eccentric jet, severity may be underestimated.   7. Moderate pulmonary hypertension.   8. Left pleural effusion noted.   9. No pericardial effusion seen.  10. No prior echocardiogram is available for comparison.    < end of copied text >    Imaging Personally Reviewed:  YES/NO    Consultant(s) Notes Reviewed:   YES/ No    Care Discussed with Consultants :     Plan of care was discussed with patient and /or primary care giver; all questions and concerns were addressed and care was aligned with patient's wishes.

## 2025-01-15 ENCOUNTER — TRANSCRIPTION ENCOUNTER (OUTPATIENT)
Age: 55
End: 2025-01-15

## 2025-01-15 DIAGNOSIS — Z51.5 ENCOUNTER FOR PALLIATIVE CARE: ICD-10-CM

## 2025-01-15 DIAGNOSIS — R53.81 OTHER MALAISE: ICD-10-CM

## 2025-01-15 DIAGNOSIS — E43 UNSPECIFIED SEVERE PROTEIN-CALORIE MALNUTRITION: ICD-10-CM

## 2025-01-15 DIAGNOSIS — R06.02 SHORTNESS OF BREATH: ICD-10-CM

## 2025-01-15 DIAGNOSIS — J96.01 ACUTE RESPIRATORY FAILURE WITH HYPOXIA: ICD-10-CM

## 2025-01-15 LAB
ANION GAP SERPL CALC-SCNC: 8 MMOL/L — SIGNIFICANT CHANGE UP (ref 5–17)
BUN SERPL-MCNC: 16 MG/DL — SIGNIFICANT CHANGE UP (ref 7–18)
CALCIUM SERPL-MCNC: 8.2 MG/DL — LOW (ref 8.4–10.5)
CHLORIDE SERPL-SCNC: 101 MMOL/L — SIGNIFICANT CHANGE UP (ref 96–108)
CO2 SERPL-SCNC: 27 MMOL/L — SIGNIFICANT CHANGE UP (ref 22–31)
CREAT SERPL-MCNC: 0.52 MG/DL — SIGNIFICANT CHANGE UP (ref 0.5–1.3)
EGFR: 121 ML/MIN/1.73M2 — SIGNIFICANT CHANGE UP
GLUCOSE SERPL-MCNC: 131 MG/DL — HIGH (ref 70–99)
HCT VFR BLD CALC: 29.4 % — LOW (ref 39–50)
HGB BLD-MCNC: 9.8 G/DL — LOW (ref 13–17)
MCHC RBC-ENTMCNC: 31.1 PG — SIGNIFICANT CHANGE UP (ref 27–34)
MCHC RBC-ENTMCNC: 33.3 G/DL — SIGNIFICANT CHANGE UP (ref 32–36)
MCV RBC AUTO: 93.3 FL — SIGNIFICANT CHANGE UP (ref 80–100)
NRBC # BLD: 0 /100 WBCS — SIGNIFICANT CHANGE UP (ref 0–0)
PLATELET # BLD AUTO: 118 K/UL — LOW (ref 150–400)
POTASSIUM SERPL-MCNC: 3.3 MMOL/L — LOW (ref 3.5–5.3)
POTASSIUM SERPL-SCNC: 3.3 MMOL/L — LOW (ref 3.5–5.3)
RBC # BLD: 3.15 M/UL — LOW (ref 4.2–5.8)
RBC # FLD: 17.1 % — HIGH (ref 10.3–14.5)
SODIUM SERPL-SCNC: 136 MMOL/L — SIGNIFICANT CHANGE UP (ref 135–145)
WBC # BLD: 8.46 K/UL — SIGNIFICANT CHANGE UP (ref 3.8–10.5)
WBC # FLD AUTO: 8.46 K/UL — SIGNIFICANT CHANGE UP (ref 3.8–10.5)

## 2025-01-15 PROCEDURE — 99223 1ST HOSP IP/OBS HIGH 75: CPT

## 2025-01-15 PROCEDURE — 99497 ADVNCD CARE PLAN 30 MIN: CPT | Mod: 25

## 2025-01-15 RX ORDER — FLUTICASONE PROPIONATE AND SALMETEROL 50; 500 UG/1; UG/1
1 POWDER ORAL; RESPIRATORY (INHALATION)
Qty: 1 | Refills: 0
Start: 2025-01-15 | End: 2025-02-13

## 2025-01-15 RX ORDER — ACETAMINOPHEN 80 MG/.8ML
2 SOLUTION/ DROPS ORAL
Qty: 0 | Refills: 0 | DISCHARGE
Start: 2025-01-15

## 2025-01-15 RX ORDER — FUROSEMIDE 20 MG
1 TABLET ORAL
Qty: 7 | Refills: 0
Start: 2025-01-15 | End: 2025-01-21

## 2025-01-15 RX ORDER — POTASSIUM CHLORIDE 600 MG/1
40 TABLET, FILM COATED, EXTENDED RELEASE ORAL ONCE
Refills: 0 | Status: COMPLETED | OUTPATIENT
Start: 2025-01-15 | End: 2025-01-15

## 2025-01-15 RX ORDER — FUROSEMIDE 20 MG
40 TABLET ORAL DAILY
Refills: 0 | Status: DISCONTINUED | OUTPATIENT
Start: 2025-01-15 | End: 2025-01-16

## 2025-01-15 RX ADMIN — FLUTICASONE PROPIONATE AND SALMETEROL 1 DOSE(S): 50; 500 POWDER ORAL; RESPIRATORY (INHALATION) at 13:18

## 2025-01-15 RX ADMIN — IPRATROPIUM BROMIDE AND ALBUTEROL SULFATE 3 MILLILITER(S): .5; 2.5 SOLUTION RESPIRATORY (INHALATION) at 08:50

## 2025-01-15 RX ADMIN — IPRATROPIUM BROMIDE AND ALBUTEROL SULFATE 3 MILLILITER(S): .5; 2.5 SOLUTION RESPIRATORY (INHALATION) at 15:03

## 2025-01-15 RX ADMIN — ENOXAPARIN SODIUM 40 MILLIGRAM(S): 60 INJECTION INTRAVENOUS; SUBCUTANEOUS at 06:36

## 2025-01-15 RX ADMIN — ONDANSETRON 4 MILLIGRAM(S): 4 TABLET ORAL at 09:12

## 2025-01-15 RX ADMIN — FLUTICASONE PROPIONATE AND SALMETEROL 1 DOSE(S): 50; 500 POWDER ORAL; RESPIRATORY (INHALATION) at 21:54

## 2025-01-15 RX ADMIN — PANTOPRAZOLE 40 MILLIGRAM(S): 40 TABLET, DELAYED RELEASE ORAL at 06:36

## 2025-01-15 RX ADMIN — POTASSIUM CHLORIDE 40 MILLIEQUIVALENT(S): 600 TABLET, FILM COATED, EXTENDED RELEASE ORAL at 11:53

## 2025-01-15 RX ADMIN — IPRATROPIUM BROMIDE AND ALBUTEROL SULFATE 3 MILLILITER(S): .5; 2.5 SOLUTION RESPIRATORY (INHALATION) at 20:23

## 2025-01-15 NOTE — DISCHARGE NOTE PROVIDER - NSDCFUADDAPPT_GEN_ALL_CORE_FT
APPTS ARE READY TO BE MADE: [X] YES    Best Family or Patient Contact (if needed):    Additional Information about above appointments (if needed):    1: Dr David, surgical oncologist in 2 weeks from today 1/15/25   2:   3:     Other comments or requests:

## 2025-01-15 NOTE — CONSULT NOTE ADULT - PROBLEM SELECTOR RECOMMENDATION 2
H/o CA gastric with mets on chemotherapy s/p modified  FOLFOX;  s/p J tube removal; currently on FLOT therapy, 4th session last session on 12/16, follows w/ Dr. Palma at [facility]  ECOG 3  Pt stated he no longer wants treatment.  He is open to learning about hospice.   Scheduled appointment for 1/16 w Dr Palma    MOLST: DNR/DNI

## 2025-01-15 NOTE — CONSULT NOTE ADULT - SUBJECTIVE AND OBJECTIVE BOX
[facility] Palliative Consult Service:  Lotus Jen DO: cell ([phone])  Joss Lai MD: cell ([phone])  Bryn Andersen NP: cell ([phone])   Jessica Fleischer-Black MD: cell ([phone])  Milan Salmeron LMSW: cell ([phone])       Can contact any Palliative Team member via [employer] Teams for consults and questions      HPI:  Patient is a 54M, Pmhx of CA gastric w/ mets on FLOT chemo s/p J tube removal, HTN. Patient presented with 3 weeks of shortness of breath for 3 week he is currently able to walk 1 block without feeling sob prior to the onset of symptoms he was able to walk 10 blocks. It is associated with palpitations on walking. He has progressive bilateral leg swelling. He noticed mild abdominal distension. He denies chest pain, orthopnea, pnd, lightheadedness, cough, rhinorrhea,  facial pain, n/v/d/c.     Patient's recent chemo session was 11/16 with Dr. Palma at [facility]. He finished 4 sessions so far. (07 Jan 2025 19:37)    Interval hx: Pt AOX3. Admitted for SOB 2/2 pleural effusion.        PAST MEDICAL & SURGICAL HISTORY:  HTN (hypertension)      History of prediabetes      Hyperlipidemia      Cigarette smoker      Malignant neoplasm of body of stomach      No significant past surgical history          SOCIAL HISTORY:    Admitted from:  home alone  Pt assigned his niece Lauren Berman as his HCP  [ none ] Substance abuse, [ none ] Tobacco hx, [ none ] Alcohol hx, [ none ] Home Opioid Hx  Gnosticism: n/a    Lauren Berman (HCP)   Phone# [phone]    FAMILY HISTORY:  No pertinent family history in first degree relatives     unable to obtain from patient due to poor mentation, family unable to give information, see H&P for history  Baseline ADLs (prior to admission): independent     Allergies    No Known Allergies    Intolerances      Present Symptoms: Mild, Moderate, Severe  Pain: denies             Location -                               Aggravating factors -             Quality -             Radiation -             Timing-             Severity (0-10 scale):             Minimal acceptable level (0-10 scale):  Fatigue: mild  Nausea: severe  Lack of Appetite: severe  SOB: mild  Depression:unassessed  Anxiety: unassessed  Review of Systems: [All others negative     CPOT:    https://[email or web address]/Critical-Care-Pain-Observation-Tool-(CPOT)  PAIN AD Score:   [email or web address]/painad.pdf (press ctrl +  left click to view)      MEDICATIONS  (STANDING):  albuterol/ipratropium for Nebulization 3 milliLiter(s) Nebulizer every 6 hours  enoxaparin Injectable 40 milliGRAM(s) SubCutaneous every 24 hours  fluticasone propionate/ salmeterol 250-50 MICROgram(s) Diskus 1 Dose(s) Inhalation two times a day  furosemide   Injectable 40 milliGRAM(s) IV Push two times a day  pantoprazole    Tablet 40 milliGRAM(s) Oral before breakfast    MEDICATIONS  (PRN):  acetaminophen     Tablet .. 650 milliGRAM(s) Oral every 6 hours PRN Temp greater or equal to 38C (100.4F), Mild Pain (1 - 3)  aluminum hydroxide/magnesium hydroxide/simethicone Suspension 30 milliLiter(s) Oral every 4 hours PRN Dyspepsia  melatonin 3 milliGRAM(s) Oral at bedtime PRN Insomnia  ondansetron Injectable 4 milliGRAM(s) IV Push every 8 hours PRN Nausea and/or Vomiting      PHYSICAL EXAM:  Vital Signs Last 24 Hrs  T(C): 37.2 (15 Zack 2025 05:29), Max: 37.3 (14 Jan 2025 21:15)  T(F): 99 (15 Zack 2025 05:29), Max: 99.1 (14 Jan 2025 21:15)  HR: 98 (15 Zack 2025 05:29) (91 - 106)  BP: 108/64 (15 Zack 2025 05:29) (107/67 - 112/71)  BP(mean): 78 (14 Jan 2025 21:15) (78 - 78)  RR: 18 (15 Zack 2025 05:29) (17 - 18)  SpO2: 93% (15 Zack 2025 05:29) (91% - 95%)    Parameters below as of 15 Zack 2025 05:29  Patient On (Oxygen Delivery Method): room air        General: Chronically ill, cachetic man, AOx3. c/o nausea, fatigue and SOB     Palliative Performance Scale/Karnofsky Score: 30%  http://npcrc.org/files/news/palliative_performance_scale_ppsv2.pdf    HEENT: no abnormal lesion, dry mouth, neck supple  Lungs: unlabored on RA  CV: RRR, S1S2  GI: soft mild distention non tender             last BM: 1/14  : urinating   Musculoskeletal: weakness x4, ambulatory, no edema  Skin: no abnormal skin lesions  Neuro: no deficits, able to follow commands  Oral intake ability:  full capability    LABS:                        9.8    8.46  )-----------( 118      ( 15 Zack 2025 07:03 )             29.4     01-15    136  |  101  |  16  ----------------------------<  131[H]  3.3[L]   |  27  |  0.52    Ca    8.2[L]      15 Zack 2025 07:03      Urinalysis Basic - ( 15 Zack 2025 07:03 )    Color: x / Appearance: x / SG: x / pH: x  Gluc: 131 mg/dL / Ketone: x  / Bili: x / Urobili: x   Blood: x / Protein: x / Nitrite: x   Leuk Esterase: x / RBC: x / WBC x   Sq Epi: x / Non Sq Epi: x / Bacteria: x    < from: CT Angio Chest PE Protocol w/ IV Cont (01.07.25 @ 13:05) >    ACC: [id number] EXAM:  [facility]   ORDERED BY:    KAM ZAMARRIPA     PROCEDURE DATE:  01/07/2025          INTERPRETATION:  CLINICAL INDICATION: Dyspnea, gastric cancer    TECHNIQUE: A volumetric acquisition of the chest was obtained from the   thoracic inlet to the upper abdomen during dynamic administration of   intravenous contrast according to the PE protocol. 3D MIP images were   provided.    CONTRAST/COMPLICATIONS:  IV Contrast: Omnipaque 350  60 cc administered   40 cc discarded  Oral Contrast: NONE  Complications: .    COMPARISON: CT abdomen 12/24/2024.      FINDINGS:  Pulmonary Artery: The study is technically adequate with a good contrast   bolus to the pulmonary arteries. There are no filling defects in the  pulmonary artery or its branches.    Aorta: Normal in course and caliber.    Cardiac: The heart is normal in size. No pericardial effusion.  Right   sided central venous access port with the tip terminating in the right   atrium.    Lungs/Airways/Pleura: Patent central airways. Moderate bilateral pleural   effusions with adjacent compressive atelectasis have increased since   December 24, 2024. Emphysema.  Scattered calcified granulomas.    Mediastinum/Lymph nodes: No thoracic adenopathy.    Upper Abdomen: Evaluation of the partially imaged upper abdomen   demonstrates ascites.    Bones and Soft Tissues: No acute osseous abnormality.      IMPRESSION:  No evidence of pulmonary embolism.    Moderate bilateral pleural effusions with adjacent compressive   atelectasis have increased since December 24, 2024.    < end of copied text >      RADIOLOGY & ADDITIONAL STUDIES: reviewed  ADVANCED DIRECTIVES: MOLST: DNR/DNI

## 2025-01-15 NOTE — CONSULT NOTE ADULT - PROBLEM SELECTOR RECOMMENDATION 4
Pt reports fatigue and weakness. +hypoalbuminemia    Supportive care  OOB to chair as tolerated  Freq positioning    Pt eval

## 2025-01-15 NOTE — CONSULT NOTE ADULT - PROBLEM SELECTOR RECOMMENDATION 5
Patient is a 54M, Pmhx of CA gastric w/ mets  s/p modified  FOLFOX currently on FLOT chemo s/p J tube removal, HTN. Patient presented with 3 weeks of shortness of breath only able to walk 1 block without feeling sob prior to the onset of symptoms he was able to walk 10 blocks. It is associated with palpitations on walking. He has progressive bilateral leg swelling with mild abdominal distension.  Treated for pleural effusions. Pt stated he does not want any further cancer treatments. He is open to hospice.  ANIRUDH drafted: DNR/DNI.  Please see discussion noted above in GOC conversation.

## 2025-01-15 NOTE — DISCHARGE NOTE PROVIDER - NSDCHC_MEDRECSTATUS_GEN_ALL_CORE
Admission Reconciliation is Not Complete  Discharge Reconciliation is Not Complete Back pain    BPH (benign prostatic hyperplasia)    CAD (coronary artery disease)  STENTS 2000 AND 2002  Diabetes    DVT (deep venous thrombosis)  LEFT LE- 2008 AND 2018 AFTER INJURY  Esophagitis    GERD (gastroesophageal reflux disease)    High cholesterol    Hypertension complications    Hypothyroid    ETELVINA (obstructive sleep apnea)  NOT USING cpap  Pain    Seizure  last episode 2 yrs ago Admission Reconciliation is Not Complete  Discharge Reconciliation is Completed Admission Reconciliation is Completed  Discharge Reconciliation is Completed

## 2025-01-15 NOTE — PROGRESS NOTE ADULT - PROBLEM SELECTOR PROBLEM 4
Prophylactic measure
HTN (hypertension)
HTN (hypertension)
Prophylactic measure

## 2025-01-15 NOTE — PROGRESS NOTE ADULT - PROBLEM SELECTOR PLAN 2
- H/o CA gastric with mets on chemotherapy on FLOT therapy, 4th session last session on 12/16, follows w/ Dr. Palma [facility]   - [facility] following -- there is further plans for chemotherapy   - Recommend call Dr. David for surgical guidance for possible surgery -- surgery consulted today 1/10  - Recommend Palliative care consult to discuss goals if surgery not an option and fluid is malignant. -- will consult once surgery evaluated the pt
H/o CA gastric w/ mets   - Likely 2/2 malignancy   - P/w Dyspnea on exertion, palpitation on exertion and chronic LE grade 3 pitting pedal edema for the past 3 weeks.  - Pt started on NC  - S/p CXR and CTA Chest showed progressive moderately sized pleural effusions, no PE  - C/w Lasix 40mg IV BID   - Pulm-Dr. Martin consult requested-f/u rec's
- H/o CA gastric with mets on chemotherapy on FLOT therapy, 4th session last session on 12/16, follows w/ Dr. Palma [facility]   - [facility] following -- there is further plans for chemotherapy   - Recommend call Dr. David for surgical guidance for possible surgery -- surgery consulted today 1/10  - Recommend Palliative care consult to discuss goals if surgery not an option and fluid is malignant. -- will consult once surgery evaluated the pt
- H/o CA gastric with mets on chemotherapy on FLOT therapy, 4th session last session on 12/16, follows w/ Dr. Palma [facility]   - [facility] following -- there is further plans for chemotherapy   - Sx recommend -No acute surgical intervention  -Patient should follow up with Dr. David within 2 weeks of discharge to discuss additional potential management
- H/o CA gastric with mets on chemotherapy on FLOT therapy, 4th session last session on 12/16, follows w/ Dr. Louie ENGLNAD   - [facility] consulted -- Dr Watkins on call today
- H/o CA gastric with mets on chemotherapy on FLOT therapy, 4th session last session on 12/16, follows w/ Dr. Palma [facility]   - [facility] following -- there is further plans for chemotherapy   - Recommend call Dr. David for surgical guidance for possible surgery -- surgery consulted today 1/10  - Recommend Palliative care consult to discuss goals if surgery not an option and fluid is malignant. -- will consult once surgery evaluated the pt
- H/o CA gastric with mets on chemotherapy on FLOT therapy, 4th session last session on 12/16, follows w/ Dr. Louie ENGLAND   - [facility] consulted -- Dr Watkins on call today
H/o CA gastric w/ mets   - Likely 2/2 malignancy   - P/w Dyspnea on exertion, palpitation on exertion and chronic LE grade 3 pitting pedal edema for the past 3 weeks.  - Pt started on NC  - S/p CXR and CTA Chest showed progressive moderately sized pleural effusions, no PE  - C/w Lasix 40mg IV BID   - Pulm-Dr. Martin consult requested-f/u rec's
- H/o CA gastric with mets on chemotherapy on FLOT therapy, 4th session last session on 12/16, follows w/ Dr. Palma [facility]   - [facility] following -- there is further plans for chemotherapy   - Recommend call Dr. David for surgical guidance for possible surgery -- surgery consulted today 1/10  - Recommend Palliative care consult to discuss goals if surgery not an option and fluid is malignant. -- will consult once surgery evaluated the pt
- H/o CA gastric with mets on chemotherapy on FLOT therapy, 4th session last session on 12/16, follows w/ Dr. Palma [facility]   - [facility] following -- there is further plans for chemotherapy   - Recommend call Dr. David for surgical guidance for possible surgery -- surgery consulted today 1/10  - Recommend Palliative care consult to discuss goals if surgery not an option and fluid is malignant. -- will consult once surgery evaluated the pt
- H/o CA gastric with mets on chemotherapy on FLOT therapy, 4th session last session on 12/16, follows w/ Dr. Palma [facility]   - [facility] following -- there is further plans for chemotherapy   - Sx recommend -No acute surgical intervention  -Patient should follow up with Dr. David within 2 weeks of discharge to discuss additional potential management
- H/o CA gastric with mets on chemotherapy on FLOT therapy, 4th session last session on 12/16, follows w/ Dr. Palma [facility]   - [facility] following -- there is further plans for chemotherapy   - Recommend call Dr. David for surgical guidance for possible surgery -- surgery consulted today 1/10  - Recommend Palliative care consult to discuss goals if surgery not an option and fluid is malignant. -- will consult once surgery evaluated the pt

## 2025-01-15 NOTE — PROGRESS NOTE ADULT - PROBLEM SELECTOR PROBLEM 5
Prophylactic measure
Discharge planning issues
Prophylactic measure
Discharge planning issues

## 2025-01-15 NOTE — CONSULT NOTE ADULT - PROBLEM SELECTOR RECOMMENDATION 3
Clinical evidence indicates that the patient has Severe protein calorie malnutrition/ 3rd degree. Albumin 1.9, poor appetite prior to admission 2/2 nausea    In context of  Chronic Illness (>1 month)    Energy/Food intake <50% of estimated energy requirement >5 days  Weight loss: Moderate - severe (lbs lost recently)  Body Fat loss: Severe   (Cachexia, temporal wasting, contracted, muscle atrophy)  Muscle mass loss: Severe  (Skin failure/pressure ulcers)  Fluid Accumulation: Severe (Fluid overload, ascites, pleural effusions)   Strength: weakened severe (bedbound)    Recommend:   pleasure feeds as tolerated - aspiration precautions, careful hand-feeding, teaching to caregivers  nutritional supplements as tolerated, nutrition consult    c/w Zofran

## 2025-01-15 NOTE — DISCHARGE NOTE PROVIDER - HOSPITAL COURSE
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 Admitted for management of pleural effusion likely 2/2 Malignancy. CTA Chest showed progressive moderately sized pleural effusions, no PE, B/L LE edema neg for DVT.   Pt. followed by pulm Dr. Martin.  No need to drain/ tap Bilateral Pleural Effusions at this time ( not enough fluid pocket to drain ). Patient was on lasix 40 mg IVP twice daily during this hospital course which will be transitioned to P.O lasix on discharge.   Pt to continue on  duonebs + advair to help with SOB   Pt. followed by hem/onc Dr. Palma. Recommended surgical consult with Dr. David for surgical guidance for possible surgery and also recommend Palliative care consult to discuss goals if surgery not an option and fluid is malignant.  Thus, surgery was consulted and recommends no acute intervention at this time, but should f/u with Dr. David, surgical oncologist within 2 weeks of discharge to disucss additional potential management.   Pt is agreeable to f/u with surgical oncology in the community.  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 Admitted for management of pleural effusion likely 2/2 Malignancy. CTA Chest showed progressive moderately sized pleural effusions, no PE, B/L LE edema neg for DVT.   Pt. followed by pulm Dr. Martin.  No need to drain/ tap Bilateral Pleural Effusions at this time ( not enough fluid pocket to drain ). Patient was on lasix 40 mg IVP twice daily during this hospital course which will be transitioned to P.O lasix on discharge.   Pt to continue on  duonebs + advair to help with SOB   Pt. followed by hem/onc Dr. Palma. Recommended surgical consult with Dr. David for surgical guidance for possible surgery and also recommend Palliative care consult to discuss goals if surgery not an option and fluid is malignant.  Thus, surgery was consulted and recommends no acute intervention at this time, but should f/u with Dr. David, surgical oncologist within 2 weeks of discharge to discuss additional potential management.   Pt is agreeable to f/u with surgical oncology in the community.  Pt is medically optimized for d/c  and f/u with Heme/onc/ surgical oncology as outpatient

## 2025-01-15 NOTE — DISCHARGE NOTE PROVIDER - NSDCCPCAREPLAN_GEN_ALL_CORE_FT
PRINCIPAL DISCHARGE DIAGNOSIS  Diagnosis: Pleural effusion  Assessment and Plan of Treatment: You presented in the ED with complaints of  Dyspnea on exertion, palpitation on exertion and chronic lower legs grade 3 pitting pedal edema for the past 3 weeks.   CT angio chest  showed progressive moderately sized pleural effusions, but negative for pulmonary embolism.   Also, doppler was done to check any clots in lower legs, result was negative for Deep vein thrombosis.   Pulmonologist -Dr. Martin was  following  who reviewed your imaging tests, there was not enough pocket for thoracentesis  You were treated with IV lasix 40mg two times daily  - CONTINUE TO TAKE P.O LASIX as prescribed   continue duonebs + advair inhalers to help with dyspnea         SECONDARY DISCHARGE DIAGNOSES  Diagnosis: Gastric cancer  Assessment and Plan of Treatment: continue to follow up with Dr. Palma and also please follow up with Dr. David( surgical oncologist)  within 2 weeks of discharge to discuss additional potential management       PRINCIPAL DISCHARGE DIAGNOSIS  Diagnosis: Pleural effusion  Assessment and Plan of Treatment: You presented in the ED with complaints of  Dyspnea on exertion, palpitation on exertion and chronic lower legs grade 3 pitting pedal edema for the past 3 weeks.   CT angio chest  showed progressive moderately sized pleural effusions, but negative for pulmonary embolism.   Also, doppler was done to check any clots in lower legs, result was negative for Deep vein thrombosis.   Pulmonologist -Dr. Martin was  following  who reviewed your imaging tests, there was not enough pocket for thoracentesis  You were treated with IV lasix 40mg two times daily  - CONTINUE TO TAKE P.O LASIX as prescribed   continue duonebs + advair inhalers to help with dyspnea .  Pleural effusion is fluid buildup in the space between the layers of the pleura. The cause of your pleural effusion is likely due to malignancy.  Call your local emergency number (911 in the ) if: You find it very hard to breathe. You feel faint, or you cannot think clearly.  Seek care immediately if: Your breathing problems do not go away, or they get worse.  Prevent another pleural effusion: Maintain a healthy lifestyle: Eat a variety of healthy foods. Do not smoke and do not allow others to smoke around you. Drink liquids as directed and rest as needed. Rest when you feel it is needed.  Follow up with your doctor ( heme/oncologist)        SECONDARY DISCHARGE DIAGNOSES  Diagnosis: Gastric cancer  Assessment and Plan of Treatment: continue to follow up with Dr. Palma and also please follow up with Dr. David( surgical oncologist)  within 2 weeks of discharge to discuss additional potential management      Diagnosis: HTN (hypertension)  Assessment and Plan of Treatment: You have a history of high blood pressure. High blood pressure is a condition that puts you at risk for heart attack, stroke and kidney disease. Please continue to take your medications as prescribed. You can also help control your blood pressure by maintaining a healthy weight, eating a diet low in fat and rich in fruits and vegetables, reduce the amount of salt in your diet. Also, reduce alcohol and try to include some form of physical activity daily for at least 30 mins. Follow up with your medical doctor to establish long term blood pressure treatment goals.  Notify your doctor if you have any of the following symptoms:   Dizziness, Lightheadedness, Blurry vision, Headache, Chest pain, Shortness of breath

## 2025-01-15 NOTE — DISCHARGE NOTE PROVIDER - PROVIDER TOKENS
PROVIDER:[TOKEN:[[id number]],SCHEDULEDAPPT:[01/16/2025],ESTABLISHEDPATIENT:[T]] PROVIDER:[TOKEN:[[id number]],SCHEDULEDAPPT:[01/16/2025],ESTABLISHEDPATIENT:[T]],PROVIDER:[TOKEN:[[id number]],FOLLOWUP:[2 weeks]]

## 2025-01-15 NOTE — PROGRESS NOTE ADULT - PROBLEM SELECTOR PROBLEM 2
Pleural effusion
Gastric cancer
Pleural effusion
Gastric cancer
31.3

## 2025-01-15 NOTE — PROGRESS NOTE ADULT - PROBLEM SELECTOR PLAN 3
- H/o HTN on Losartan  - BP stable currently holding BP meds  - BP controlled  - ECHO as above
- H/o HTN on Losartan  - BP stable currently holding BP meds  - BP controlled  - F/U ECHO
- H/o HTN on Losartan  - BP stable currently holding BP meds  - BP controlled  - F/U ECHO -- pending read
H/o CA gastric with mets on chemotherapy on FLOT therapy, 4th session last session on 12/16, follows w/ Dr. Palma [facility]   - [facility] consult requested-f/u rec's
- H/o HTN on Losartan  - BP stable currently holding BP meds  - BP controlled  - F/U ECHO -- pending read
- H/o HTN on Losartan  - BP stable currently holding BP meds  - BP controlled  - F/U ECHO -- pending read
H/o CA gastric with mets on chemotherapy on FLOT therapy, 4th session last session on 12/16, follows w/ Dr. Palma [facility]   - [facility] consult requested-f/u rec's
- H/o HTN on Losartan  - BP stable currently holding BP meds  - BP controlled  - F/U ECHO -- pending read
- H/o HTN on Losartan  - BP stable currently holding BP meds  - BP controlled  - ECHO as above
- H/o HTN on Losartan  - BP stable currently holding BP meds  - BP controlled  - ECHO as above
- H/o HTN on Losartan  - BP stable currently holding BP meds  - BP controlled  - F/U ECHO -- pending read
- H/o HTN on Losartan  - BP stable currently holding BP meds  - BP controlled  - F/U ECHO -- pending read
- H/o HTN on Losartan  - BP stable currently holding BP meds  - BP controlled  - ECHO as above
- H/o HTN on Losartan  - BP stable currently holding BP meds  - BP controlled  - F/U ECHO

## 2025-01-15 NOTE — PROGRESS NOTE ADULT - PROBLEM SELECTOR PROBLEM 1
Pleural effusion
Hypokalemia
Pleural effusion
Hypokalemia

## 2025-01-15 NOTE — PROGRESS NOTE ADULT - SUBJECTIVE AND OBJECTIVE BOX
PATIENT SEEN AND EXAMINED BY QUIANA CHAWLA M.D. ON :- 1/15/25  DATE OF SERVICE:        1/15/25     Interim events noted,Labs ,Radiological studies and Cardiology tests reviewed .    Patient is a 54y old  Male who presents with a chief complaint of Pleural effusion (15 Zack 2025 17:44)      HPI:  Patient is a 54M, Pmhx of CA gastric w/ mets on FLOT chemo s/p J tube removal, HTN. Patient presented with 3 weeks of shortness of breath for 3 week he is currently able to walk 1 block without feeling sob prior to the onset of symptoms he was able to walk 10 blocks. It is associated with palpitations on walking. He has progressive bilateral leg swelling. He noticed mild abdominal distension. He denies chest pain, orthopnea, pnd, lightheadedness, cough, rhinorrhea,  facial pain, n/v/d/c.     Patient's recent chemo session was 11/16 with Dr. Palma at [facility]. He finished 4 sessions so far. (07 Jan 2025 19:37)      PAST MEDICAL & SURGICAL HISTORY:  HTN (hypertension)      History of prediabetes      Hyperlipidemia      Cigarette smoker      Malignant neoplasm of body of stomach      No significant past surgical history          PREVIOUS DIAGNOSTIC TESTING:      ECHO  FINDINGS:    STRESS  FINDINGS:    CATHETERIZATION  FINDINGS:    MEDICATIONS  (STANDING):  albuterol/ipratropium for Nebulization 3 milliLiter(s) Nebulizer every 6 hours  enoxaparin Injectable 40 milliGRAM(s) SubCutaneous every 24 hours  fluticasone propionate/ salmeterol 250-50 MICROgram(s) Diskus 1 Dose(s) Inhalation two times a day  furosemide    Tablet 40 milliGRAM(s) Oral daily  pantoprazole    Tablet 40 milliGRAM(s) Oral before breakfast    MEDICATIONS  (PRN):  acetaminophen     Tablet .. 650 milliGRAM(s) Oral every 6 hours PRN Temp greater or equal to 38C (100.4F), Mild Pain (1 - 3)  aluminum hydroxide/magnesium hydroxide/simethicone Suspension 30 milliLiter(s) Oral every 4 hours PRN Dyspepsia  melatonin 3 milliGRAM(s) Oral at bedtime PRN Insomnia  ondansetron Injectable 4 milliGRAM(s) IV Push every 8 hours PRN Nausea and/or Vomiting      FAMILY HISTORY:  No pertinent family history in first degree relatives        SOCIAL HISTORY:    CIGARETTES:    ALCOHOL:    REVIEW OF SYSTEMS:  CONSTITUTIONAL: No fever, weight loss, or fatigue  EYES: No eye pain, visual disturbances, or discharge  ENMT:  No difficulty hearing, tinnitus, vertigo; No sinus or throat pain  NECK: No pain or stiffness  RESPIRATORY: No cough, wheezing, chills or hemoptysis; No shortness of breath  CARDIOVASCULAR: No chest pain, palpitations, dizziness, or leg swelling  GASTROINTESTINAL: No abdominal or epigastric pain. No nausea, vomiting, or hematemesis; No diarrhea or constipation. No melena or hematochezia.  GENITOURINARY: No dysuria, frequency, hematuria, or incontinence  NEUROLOGICAL: No headaches, memory loss, loss of strength, numbness, or tremors  SKIN: No itching, burning, rashes, or lesions   LYMPH NODES: No enlarged glands  ENDOCRINE: No heat or cold intolerance; No hair loss  MUSCULOSKELETAL: No joint pain or swelling; No muscle, back, or extremity pain  PSYCHIATRIC: No depression, anxiety, mood swings, or difficulty sleeping  HEME/LYMPH: No easy bruising, or bleeding gums  ALLERY AND IMMUNOLOGIC: No hives or eczema    Vital Signs Last 24 Hrs  T(C): 36.8 (15 Zack 2025 21:11), Max: 37.2 (15 Zack 2025 05:29)  T(F): 98.2 (15 Zack 2025 21:11), Max: 99 (15 Zack 2025 05:29)  HR: 99 (15 Zack 2025 21:11) (82 - 99)  BP: 94/64 (15 Zack 2025 21:11) (94/64 - 126/76)  BP(mean): 91 (15 Zack 2025 13:18) (91 - 91)  RR: 16 (15 Zack 2025 21:11) (16 - 18)  SpO2: 95% (15 Zack 2025 21:11) (93% - 95%)    Parameters below as of 15 Zack 2025 21:11  Patient On (Oxygen Delivery Method): room air          PHYSICAL EXAM:  GENERAL: NAD, well-groomed, well-developed  HEAD:  Atraumatic, Normocephalic  EYES: EOMI, PERRLA, conjunctiva and sclera clear  ENMT: No tonsillar erythema, exudates, or enlargement; Moist mucous membranes, Good dentition, No lesions  NECK: Supple, No JVD, Normal thyroid  NERVOUS SYSTEM:  Alert & Oriented X3, Good concentration; Motor Strength 5/5 B/L upper and lower extremities; DTRs 2+ intact and symmetric  CHEST/LUNG: Clear to percussion bilaterally; No rales, rhonchi, wheezing, or rubs  HEART: Regular rate and rhythm; No murmurs, rubs, or gallops  ABDOMEN: Soft, Nontender, Nondistended; Bowel sounds present  EXTREMITIES:  2+ Peripheral Pulses, No clubbing, cyanosis, or edema  LYMPH: No lymphadenopathy noted  SKIN: No rashes or lesions      INTERPRETATION OF TELEMETRY:    ECG:    [facility]:     LABS:                        9.8    8.46  )-----------( 118      ( 15 Zack 2025 07:03 )             29.4     01-15    136  |  101  |  16  ----------------------------<  131[H]  3.3[L]   |  27  |  0.52    Ca    8.2[L]      15 Zack 2025 07:03            Urinalysis Basic - ( 15 Zack 2025 07:03 )    Color: x / Appearance: x / SG: x / pH: x  Gluc: 131 mg/dL / Ketone: x  / Bili: x / Urobili: x   Blood: x / Protein: x / Nitrite: x   Leuk Esterase: x / RBC: x / WBC x   Sq Epi: x / Non Sq Epi: x / Bacteria: x      Lipid Panel:   I&O's Summary      RADIOLOGY & ADDITIONAL STUDIES:    < from: TTE W or WO Ultrasound Enhancing Agent (01.10.25 @ 09:54) >  CONCLUSIONS:      1. Left ventricular cavity is normal in size. Left ventricular wall thickness is normal. Left ventricular systolic function is hyperdynamic with an ejection fraction of 73 % by Zuniga's method of disks. There are no regional wall motion abnormalities seen.   2. Normal right ventricular cavity size and normal right ventricular systolic function.   3. Trileaflet aortic valve. Fibrocalcific aortic valve sclerosis without stenosis.   4. No evidence of aortic regurgitation.   5. Systolic anterior motion of the mitral valve with an increased left ventricular outflow tract gradient indicative of mild left ventricular outflow tract obstruction.   6. Atleast mild mitral regurgitation. The mitral regurgitant jet is eccentric jet, severity may be underestimated.   7. Moderate pulmonary hypertension.   8. Left pleural effusion noted.   9. No pericardial effusion seen.  10. No prior echocardiogram is available for comparison.    < end of copied text >

## 2025-01-15 NOTE — CONSULT NOTE ADULT - CONSULT REQUESTED DATE/TIME
14-Jan-2025 16:51
SILVINA Diez from [facility] cardiology called stating that they saw the patient today. Patient has been having increased presser of right eye, increased blood pressure, vision issues. Candice Butler from [facility] advised patient to got to the ER and have a CT scan and patient refused.    [facility] was hoping Dr. Subramanian could reach out to patient and advise her to get CT scan done. Or assist further.  
08-Jan-2025 19:19
10-Zack-2025 08:52
08-Jan-2025 23:17
15-Zack-2025 12:42

## 2025-01-15 NOTE — DISCHARGE NOTE NURSING/CASE MANAGEMENT/SOCIAL WORK - FINANCIAL ASSISTANCE
[facility] provides services at a reduced cost to those who are determined to be eligible through [facility]’s financial assistance program. Information regarding [facility]’s financial assistance program can be found by going to [email or web address] or by calling [phone].

## 2025-01-15 NOTE — DISCHARGE NOTE PROVIDER - CARE PROVIDERS DIRECT ADDRESSES
,[email or web address]

## 2025-01-15 NOTE — PROGRESS NOTE ADULT - ASSESSMENT
54 year old, M, w/ PMH of CA gastric w/ mets on FLOT chemo s/p J tube removal, HTN. Patient presented with 3 weeks of shortness of breath for 3 week he is currently able to walk 1 block without feeling sob prior to the onset of symptoms he was able to walk 10 blocks. It is associated with palpitations on walking. He has progressive bilateral leg swelling. He noticed mild abdominal distension.  Admitted for management of pleural effusion likely 2/2 Malignancy.      #  Hypokalemia.   # pleural effusion   # CA gastric w/ mets   # HTN  - Likely 2/2 malignancy   - P/w Dyspnea on exertion, palpitation on exertion and chronic LE grade 3 pitting pedal edema for the past 3 weeks.  - Pt started on NC  - S/p CXR and CTA Chest showed progressive moderately sized pleural effusions, no PE  - C/w Lasix 40mg IV BID   pulm f/u   
Patient is a 54M, Pmhx of CA gastric w/ mets on FOLFOX then  FLOT chemo s/p J tube removal, HTN. Patient presented with 3 weeks of shortness of breath for 3 week he is currently able to walk 1 block without feeling sob prior to the onset of symptoms he was able to walk 10 blocks. It is associated with palpitations on walking. He has progressive bilateral leg swelling. He noticed mild abdominal distension. He denies chest pain, orthopnea, pnd, lightheadedness, cough, rhinorrhea,  facial pain, n/v/d/c.  < from: CT Angio Chest PE Protocol w/ IV Cont   No evidence of pulmonary embolism. Moderate bilateral pleural effusions with adjacent compressive atelectasis have increased since December 24, 2024      Patient's recent chemo session was 11/16 with Dr. Palma at [facility]. He finished 4 sessions of FLOT so far. (07 Jan 2025 19:37)    Problem # 1 Gastric Cancer likely locally metastatic ? Pleural fluid metatases  -If possible to undergo diagnostic thoracentesis but does not look likely with decrease in pleural fluid and pt appearing more comfortable breathing on RA  -Discussed with Dr. Palma and there is further plans for chemotherapy   -Recommend call Dr. David for surgical guidance for possible surgery - not seen yet  -Recommend Palliative care consult to discuss goals if surgery not an option and fluid is malignant.  -Pt to return to Dr. Palma on discharge.    Thank you for the consult. For questions please call [phone]    Husam COLINDRES      
54 year old, M, w/ PMH of CA gastric w/ mets on FLOT chemo s/p J tube removal, HTN. Patient presented with 3 weeks of shortness of breath for 3 week he is currently able to walk 1 block without feeling sob prior to the onset of symptoms he was able to walk 10 blocks. It is associated with palpitations on walking. He has progressive bilateral leg swelling. He noticed mild abdominal distension.  Admitted for management of pleural effusion likely 2/2 Malignancy.      #  Hypokalemia.   # pleural effusion   # CA gastric w/ mets   # HTN  - Likely 2/2 malignancy   - P/w Dyspnea on exertion, palpitation on exertion and chronic LE grade 3 pitting pedal edema for the past 3 weeks.  - Pt started on NC  - S/p CXR and CTA Chest showed progressive moderately sized pleural effusions, no PE  - C/w Lasix 40mg IV BID   pulm f/u   
54 year old, M, w/ PMH of CA gastric w/ mets on FLOT chemo s/p J tube removal, HTN. Patient presented with 3 weeks of shortness of breath for 3 week he is currently able to walk 1 block without feeling sob prior to the onset of symptoms he was able to walk 10 blocks. It is associated with palpitations on walking. He has progressive bilateral leg swelling. He noticed mild abdominal distension.  Admitted for management of pleural effusion likely 2/2 Malignancy.      # pleural effusion   # CA gastric w/ mets   # HTN  - Likely 2/2 malignancy   - P/w Dyspnea on exertion, palpitation on exertion and chronic LE grade 3 pitting pedal edema for the past 3 weeks.  - Pt started on NC  - S/p CXR and CTA Chest showed progressive moderately sized pleural effusions, no PE  - C/w Lasix 40mg IV BID   - pulm f/u   - surgery f/u   - heme. onc f/u  
54 year old, M, w/ PMH of CA gastric w/ mets on FLOT chemo s/p J tube removal, HTN. Patient presented with 3 weeks of shortness of breath for 3 week he is currently able to walk 1 block without feeling sob prior to the onset of symptoms he was able to walk 10 blocks. It is associated with palpitations on walking. He has progressive bilateral leg swelling. He noticed mild abdominal distension.  Admitted for management of pleural effusion likely 2/2 Malignancy.      # pleural effusion   # CA gastric w/ mets   # HTN  - Likely 2/2 malignancy   - P/w Dyspnea on exertion, palpitation on exertion and chronic LE grade 3 pitting pedal edema for the past 3 weeks.  - Pt started on NC  - S/p CXR and CTA Chest showed progressive moderately sized pleural effusions, no PE  - C/w Lasix 40mg IV BID   - pulm f/u   - surgery f/u   - heme. onc f/u  
54 year old, M, w/ PMH of CA gastric w/ mets on FLOT chemo s/p J tube removal, HTN. Patient presented with 3 weeks of shortness of breath for 3 week he is currently able to walk 1 block without feeling sob prior to the onset of symptoms he was able to walk 10 blocks. It is associated with palpitations on walking. He has progressive bilateral leg swelling. He noticed mild abdominal distension.  Admitted for management of pleural effusion likely 2/2 Malignancy.      In the ED, VSS saturating well on RA. proBNP 202. CTA chest: negative for PE. bilateral progressive moderately sized bilateral pleural effusions.   
54 year old, M, w/ PMH of CA gastric w/ mets on FLOT chemo s/p J tube removal, HTN presented with 3 weeks of shortness of breath. Admitted for management of pleural effusion likely 2/2 Malignancy.  Pt. followed by pulm Dr. Martin.  No need to drain/ tap Bilateral Pleural Effusions at this time ( not enough fluid pocket to drain ). On lasix 40 mg IVP twice daily.    Pt. followed by hem/onc Dr. Palma-->  -There is further plans for chemotherapy.  - Recommended surgical consult with Dr. David for surgical guidance for possible surgery   -Recommend Palliative care consult to discuss goals if surgery not an option and fluid is malignant.  -Pt to return to Dr. Palma on discharge.    Pt. followed by surgery-->  -No acute surgical intervention  -Patient should follow up with Dr. David within 2 weeks of discharge to discuss additional potential management    
54 year old, M, w/ PMH of CA gastric w/ mets on FLOT chemo s/p J tube removal, HTN presented with 3 weeks of shortness of breath. Admitted for management of pleural effusion likely 2/2 Malignancy.  Pt. followed by pulm Dr. Martin.  No need to drain/ tap Bilateral Pleural Effusions at this time ( not enough fluid pocket to drain ). On lasix 40 mg IVP twice daily.    Pt. followed by hem/onc Dr. Palma-->  -There is further plans for chemotherapy.  - Recommended surgical consult with Dr. David for surgical guidance for possible surgery   -Recommend Palliative care consult to discuss goals if surgery not an option and fluid is malignant.  -Pt to return to Dr. Palma on discharge.    Pt. followed by surgery-->  -No acute surgical intervention  -Patient should follow up with Dr. David within 2 weeks of discharge to discuss additional potential management    
54 year old, M, w/ PMH of CA gastric w/ mets on FLOT chemo s/p J tube removal, HTN. Patient presented with 3 weeks of shortness of breath for 3 week he is currently able to walk 1 block without feeling sob prior to the onset of symptoms he was able to walk 10 blocks. It is associated with palpitations on walking. He has progressive bilateral leg swelling. He noticed mild abdominal distension.  Admitted for management of pleural effusion likely 2/2 Malignancy.      #  Hypokalemia.   # pleural effusion   # CA gastric w/ mets   # HTN  - Likely 2/2 malignancy   - P/w Dyspnea on exertion, palpitation on exertion and chronic LE grade 3 pitting pedal edema for the past 3 weeks.  - Pt started on NC  - S/p CXR and CTA Chest showed progressive moderately sized pleural effusions, no PE  - C/w Lasix 40mg IV BID   pulm f/u   
54 year old, M, w/ PMH of CA gastric w/ mets on FLOT chemo s/p J tube removal, HTN presented with 3 weeks of shortness of breath. Admitted for management of pleural effusion likely 2/2 Malignancy.  
54 year old, M, w/ PMH of CA gastric w/ mets on FLOT chemo s/p J tube removal, HTN. Patient presented with 3 weeks of shortness of breath for 3 week he is currently able to walk 1 block without feeling sob prior to the onset of symptoms he was able to walk 10 blocks. It is associated with palpitations on walking. He has progressive bilateral leg swelling. He noticed mild abdominal distension.  Admitted for management of pleural effusion likely 2/2 Malignancy.      #  Hypokalemia.   # pleural effusion   # CA gastric w/ mets   # HTN  - Likely 2/2 malignancy   - P/w Dyspnea on exertion, palpitation on exertion and chronic LE grade 3 pitting pedal edema for the past 3 weeks.  - Pt started on NC  - S/p CXR and CTA Chest showed progressive moderately sized pleural effusions, no PE  - C/w Lasix 40mg IV BID   pulm f/u   
54 year old, M, w/ PMH of CA gastric w/ mets on FLOT chemo s/p J tube removal, HTN. Patient presented with 3 weeks of shortness of breath for 3 week he is currently able to walk 1 block without feeling sob prior to the onset of symptoms he was able to walk 10 blocks. It is associated with palpitations on walking. He has progressive bilateral leg swelling. He noticed mild abdominal distension.  Admitted for management of pleural effusion likely 2/2 Malignancy.      #  Hypokalemia.   # pleural effusion   # CA gastric w/ mets   # HTN  - Likely 2/2 malignancy   - P/w Dyspnea on exertion, palpitation on exertion and chronic LE grade 3 pitting pedal edema for the past 3 weeks.  - Pt started on NC  - S/p CXR and CTA Chest showed progressive moderately sized pleural effusions, no PE  - C/w Lasix 40mg IV BID   pulm f/u   
54 year old, M, w/ PMH of CA gastric w/ mets on FLOT chemo s/p J tube removal, HTN presented with 3 weeks of shortness of breath. Admitted for management of pleural effusion likely 2/2 Malignancy.  
54 year old, M, w/ PMH of CA gastric w/ mets on FLOT chemo s/p J tube removal, HTN presented with 3 weeks of shortness of breath. Admitted for management of pleural effusion likely 2/2 Malignancy.  
54 year old, M, w/ PMH of CA gastric w/ mets on FLOT chemo s/p J tube removal, HTN presented with 3 weeks of shortness of breath. Admitted for management of pleural effusion likely 2/2 Malignancy.  Pt. followed by pulm Dr. Martin.  No need to drain/ tap Bilateral Pleural Effusions at this time ( not enough fluid pocket to drain ). On lasix 40 mg IVP twice daily.    Pt. followed by hem/onc Dr. Palma-->  -There is further plans for chemotherapy.  - Recommended surgical consult with Dr. David for surgical guidance for possible surgery   -Recommend Palliative care consult to discuss goals if surgery not an option and fluid is malignant.  -Pt to return to Dr. Palma on discharge.    Pt. followed by surgery-->  -No acute surgical intervention  -Patient should follow up with Dr. David within 2 weeks of discharge to discuss additional potential management    
54 year old, M, w/ PMH of CA gastric w/ mets on FLOT chemo s/p J tube removal, HTN presented with 3 weeks of shortness of breath. Admitted for management of pleural effusion likely 2/2 Malignancy.  Pt. followed by pulm Dr. Martin.  No need to drain/ tap Bilateral Pleural Effusions at this time ( not enough fluid pocket to drain ). On lasix 40 mg IVP twice daily.    Pt. followed by hem/onc Dr. Palma-->  -There is further plans for chemotherapy.  - Recommended surgical consult with Dr. David for surgical guidance for possible surgery   -Recommend Palliative care consult to discuss goals if surgery not an option and fluid is malignant.  -Pt to return to Dr. Palma on discharge.    Pt. followed by surgery-->  -No acute surgical intervention  -Patient should follow up with Dr. David within 2 weeks of discharge to discuss additional potential management    
54 year old, M, w/ PMH of CA gastric w/ mets on FLOT chemo s/p J tube removal, HTN presented with 3 weeks of shortness of breath. Admitted for management of pleural effusion likely 2/2 Malignancy.  
54 year old, M, w/ PMH of CA gastric w/ mets on FLOT chemo s/p J tube removal, HTN presented with 3 weeks of shortness of breath. Admitted for management of pleural effusion likely 2/2 Malignancy.  
54 year old, M, w/ PMH of CA gastric w/ mets on FLOT chemo s/p J tube removal, HTN. Patient presented with 3 weeks of shortness of breath for 3 week he is currently able to walk 1 block without feeling sob prior to the onset of symptoms he was able to walk 10 blocks. It is associated with palpitations on walking. He has progressive bilateral leg swelling. He noticed mild abdominal distension.  Admitted for management of pleural effusion likely 2/2 Malignancy.      In the ED, VSS saturating well on RA. proBNP 202. CTA chest: negative for PE. bilateral progressive moderately sized bilateral pleural effusions.   
54 year old, M, w/ PMH of CA gastric w/ mets on FLOT chemo s/p J tube removal, HTN presented with 3 weeks of shortness of breath. Admitted for management of pleural effusion likely 2/2 Malignancy.  

## 2025-01-15 NOTE — PROGRESS NOTE ADULT - PROBLEM SELECTOR PLAN 4
- C/w Lovenox  - C/w Protonix
- C/w Lovenox  - C/w Protonix
H/o HTN on Losartan  - BP stable currently holding BP meds
- C/w Lovenox  - C/w Protonix
H/o HTN on Losartan  - BP stable currently holding BP meds
- C/w Lovenox  - C/w Protonix

## 2025-01-15 NOTE — PROGRESS NOTE ADULT - PROBLEM SELECTOR PLAN 1
- H/o CA gastric w/ mets   - Likely 2/2 malignancy   - P/w Dyspnea on exertion, palpitation on exertion and chronic LE grade 3 pitting pedal edema for the past 3 weeks.  - pt requiring oxygen intermittently. Ambulatory sat 92% on room air on ambulation  - CTA Chest showed progressive moderately sized pleural effusions, no PE  - B/L LE edema neg for DVT  - C/w Lasix 40mg IV BID  - continue duonebs + advair  - Pulm-Dr. Martin following -- not enough pocket for thoracentesis  - CXR showed Small right pleural effusion, Decreased left pleural effusion
- H/o CA gastric w/ mets   - Likely 2/2 malignancy   - P/w Dyspnea on exertion, palpitation on exertion and chronic LE grade 3 pitting pedal edema for the past 3 weeks.  - pt requiring oxygen supplementation  - CTA Chest showed progressive moderately sized pleural effusions, no PE  - B/L LE edema neg for DVT  - C/w Lasix 40mg IV BID  - start duonebs + advair  - Pulm-Dr. Martin following -- not enough pocket for thoracentesis  - F/U CXR in AM
- H/o CA gastric w/ mets   - Likely 2/2 malignancy   - P/w Dyspnea on exertion, palpitation on exertion and chronic LE grade 3 pitting pedal edema for the past 3 weeks.  - pt requiring oxygen intermittently. Ambulatory sat 92% on room air on ambulation  - CTA Chest showed progressive moderately sized pleural effusions, no PE  - B/L LE edema neg for DVT  - C/w Lasix 40mg IV BID  - continue duonebs + advair  - Pulm-Dr. Martin following -- not enough pocket for thoracentesis  - CXR showed Small right pleural effusion, Decreased left pleural effusion
- H/o CA gastric w/ mets   - Likely 2/2 malignancy   - P/w Dyspnea on exertion, palpitation on exertion and chronic LE grade 3 pitting pedal edema for the past 3 weeks.  - pt comfortable on RA at this time  - CTA Chest showed progressive moderately sized pleural effusions, no PE  - B/L LE edema neg for DVT  - C/w Lasix 40mg IV BID  - continue duonebs + advair  - Pulm-Dr. Martin following -- not enough pocket for thoracentesis  - CXR showed Small right pleural effusion, Decreased left pleural effusion
- H/o CA gastric w/ mets   - Likely 2/2 malignancy   - P/w Dyspnea on exertion, palpitation on exertion and chronic LE grade 3 pitting pedal edema for the past 3 weeks.  - pt requiring oxygen intermittently. Ambulatory sat 92% on room air on ambulation  - CTA Chest showed progressive moderately sized pleural effusions, no PE  - B/L LE edema neg for DVT  - C/w Lasix 40mg IV BID  - continue duonebs + advair  - Pulm-Dr. Martin following -- not enough pocket for thoracentesis  - CXR showed Small right pleural effusion, Decreased left pleural effusion
- H/o CA gastric w/ mets   - Likely 2/2 malignancy   - P/w Dyspnea on exertion, palpitation on exertion and chronic LE grade 3 pitting pedal edema for the past 3 weeks.  - pt comfortable on RA at this time  - CTA Chest showed progressive moderately sized pleural effusions, no PE  - B/L LE edema neg for DVT  - C/w Lasix 40mg po daily   - continue duonebs + advair  - Pulm-Dr. Martin following -- not enough pocket for thoracentesis  - CXR showed Small right pleural effusion, Decreased left pleural effusion
- H/o CA gastric w/ mets   - Likely 2/2 malignancy   - P/w Dyspnea on exertion, palpitation on exertion and chronic LE grade 3 pitting pedal edema for the past 3 weeks.  - pt comfortable on RA at this time  - CTA Chest showed progressive moderately sized pleural effusions, no PE  - B/L LE edema neg for DVT  - C/w Lasix 40mg IV BID  - continue duonebs + advair  - Pulm-Dr. Martin following -- not enough pocket for thoracentesis  - CXR showed Small right pleural effusion, Decreased left pleural effusion
- H/o CA gastric w/ mets   - Likely 2/2 malignancy   - P/w Dyspnea on exertion, palpitation on exertion and chronic LE grade 3 pitting pedal edema for the past 3 weeks.  - pt comfortable on RA at this time  - CTA Chest showed progressive moderately sized pleural effusions, no PE  - B/L LE edema neg for DVT  - C/w Lasix 40mg po daily   - continue duonebs + advair  - Pulm-Dr. Martin following -- not enough pocket for thoracentesis  - CXR showed Small right pleural effusion, Decreased left pleural effusion
- H/o CA gastric w/ mets   - Likely 2/2 malignancy   - P/w Dyspnea on exertion, palpitation on exertion and chronic LE grade 3 pitting pedal edema for the past 3 weeks.  - pt requiring oxygen supplementation  - CTA Chest showed progressive moderately sized pleural effusions, no PE  - B/L LE edema neg for DVT  - C/w Lasix 40mg IV BID  - start duonebs + advair  - Pulm-Dr. Martin following -- not enough pocket for thoracentesis  - F/U CXR in AM
- Likely diuretic induced  - Supplemented today  - Continue to monitor
- H/o CA gastric w/ mets   - Likely 2/2 malignancy   - P/w Dyspnea on exertion, palpitation on exertion and chronic LE grade 3 pitting pedal edema for the past 3 weeks.  - pt requiring oxygen intermittently. Ambulatory sat 92% on room air on ambulation  - CTA Chest showed progressive moderately sized pleural effusions, no PE  - B/L LE edema neg for DVT  - C/w Lasix 40mg IV BID  - continue duonebs + advair  - Pulm-Dr. Martin following -- not enough pocket for thoracentesis  - CXR showed Small right pleural effusion, Decreased left pleural effusion
- Likely diuretic induced  - Supplemented today  - Continue to monitor

## 2025-01-15 NOTE — CONSULT NOTE ADULT - TIME BILLING
- Review of records, telemetry, vital signs and daily labs.   - General and cardiovascular physical examination.  - Generation of cardiovascular treatment plan and completion of note .  - Coordination of care.      Patient was seen and examined by me on 1/8/25 ,interim events noted,labs and radiology studies reviewed.  Will Torres MD,FACC.  [address] [place], [place] [id number].  [phone]  Availabe to call or text on [employer] TE[employer].
This includes chart review, patient assessment, discussion and collaboration with interdisciplinary team members, excluding ACP.

## 2025-01-15 NOTE — DISCHARGE NOTE NURSING/CASE MANAGEMENT/SOCIAL WORK - PATIENT PORTAL LINK FT
You can access the FollowMyHealth Patient Portal offered by [facility] by registering at the following website: [email or web address]. By joining [employer]’s FollowMyHealth portal, you will also be able to view your health information using other applications (apps) compatible with our system.

## 2025-01-15 NOTE — PROGRESS NOTE ADULT - SUBJECTIVE AND OBJECTIVE BOX
Patient is a 54y old  Male who presents with a chief complaint of Pleural effusion (15 Zack 2025 13:34)      INTERVAL HPI/OVERNIGHT EVENTS: no overnight events    I&O's Summary    Vital Signs Last 24 Hrs  T(C): 36.6 (15 Zack 2025 13:18), Max: 37.3 (14 Jan 2025 21:15)  T(F): 97.9 (15 Zack 2025 13:18), Max: 99.1 (14 Jan 2025 21:15)  HR: 88 (15 Zack 2025 14:23) (82 - 106)  BP: 110/58 (15 Zack 2025 14:23) (107/67 - 126/76)  BP(mean): 91 (15 Zack 2025 13:18) (78 - 91)  RR: 18 (15 Zack 2025 13:18) (18 - 18)  SpO2: 95% (15 Zack 2025 13:18) (91% - 95%)    Parameters below as of 15 Zack 2025 13:18  Patient On (Oxygen Delivery Method): room air      PAST MEDICAL & SURGICAL HISTORY:  HTN (hypertension)      History of prediabetes      Hyperlipidemia      Cigarette smoker      Malignant neoplasm of body of stomach      No significant past surgical history          SOCIAL HISTORY  Alcohol:  Tobacco:  Illicit substance use:      FAMILY HISTORY:      LABS:                        9.8    8.46  )-----------( 118      ( 15 Zack 2025 07:03 )             29.4     01-15    136  |  101  |  16  ----------------------------<  131[H]  3.3[L]   |  27  |  0.52    Ca    8.2[L]      15 Zack 2025 07:03        Urinalysis Basic - ( 15 Zack 2025 07:03 )    Color: x / Appearance: x / SG: x / pH: x  Gluc: 131 mg/dL / Ketone: x  / Bili: x / Urobili: x   Blood: x / Protein: x / Nitrite: x   Leuk Esterase: x / RBC: x / WBC x   Sq Epi: x / Non Sq Epi: x / Bacteria: x      CAPILLARY BLOOD GLUCOSE            Urinalysis Basic - ( 15 Zack 2025 07:03 )    Color: x / Appearance: x / SG: x / pH: x  Gluc: 131 mg/dL / Ketone: x  / Bili: x / Urobili: x   Blood: x / Protein: x / Nitrite: x   Leuk Esterase: x / RBC: x / WBC x   Sq Epi: x / Non Sq Epi: x / Bacteria: x        MEDICATIONS  (STANDING):  albuterol/ipratropium for Nebulization 3 milliLiter(s) Nebulizer every 6 hours  enoxaparin Injectable 40 milliGRAM(s) SubCutaneous every 24 hours  fluticasone propionate/ salmeterol 250-50 MICROgram(s) Diskus 1 Dose(s) Inhalation two times a day  furosemide   Injectable 40 milliGRAM(s) IV Push two times a day  pantoprazole    Tablet 40 milliGRAM(s) Oral before breakfast    MEDICATIONS  (PRN):  acetaminophen     Tablet .. 650 milliGRAM(s) Oral every 6 hours PRN Temp greater or equal to 38C (100.4F), Mild Pain (1 - 3)  aluminum hydroxide/magnesium hydroxide/simethicone Suspension 30 milliLiter(s) Oral every 4 hours PRN Dyspepsia  melatonin 3 milliGRAM(s) Oral at bedtime PRN Insomnia  ondansetron Injectable 4 milliGRAM(s) IV Push every 8 hours PRN Nausea and/or Vomiting      REVIEW OF SYSTEMS:  CONSTITUTIONAL: No fever  EYES: No eye pain, visual disturbances  ENMT: No sinus or throat pain  NECK: No pain or stiffness  RESPIRATORY: No cough, No shortness of breath  CARDIOVASCULAR: No chest pain, palpitations, + dizziness  GASTROINTESTINAL: No abdominal  pain. + nausea, vomiting  GENITOURINARY: No dysuria  NEUROLOGICAL: No headaches  SKIN: No rashes  MUSCULOSKELETAL: No joint pain     RADIOLOGY & ADDITIONAL TESTS:  < from: CT Angio Chest PE Protocol w/ IV Cont (01.07.25 @ 13:05) >    ACC: [id number] EXAM:  CT ANGIO CHEST PULM ART WAWIC   ORDERED BY:    KAM ZAMARRIPA     PROCEDURE DATE:  01/07/2025          INTERPRETATION:  CLINICAL INDICATION: Dyspnea, gastric cancer    TECHNIQUE: A volumetric acquisition of the chest was obtained from the   thoracic inlet to the upper abdomen during dynamic administration of   intravenous contrast according to the PE protocol. 3D MIP images were   provided.    CONTRAST/COMPLICATIONS:  IV Contrast: Omnipaque 350  60 cc administered   40 cc discarded  Oral Contrast: NONE  Complications: .    COMPARISON: CT abdomen 12/24/2024.      FINDINGS:  Pulmonary Artery: The study is technically adequate with a good contrast   bolus to the pulmonary arteries. There are no filling defects in the  pulmonary artery or its branches.    Aorta: Normal in course and caliber.    Cardiac: The heart is normal in size. No pericardial effusion.  Right   sided central venous access port with the tip terminating in the right   atrium.    Lungs/Airways/Pleura: Patent central airways. Moderate bilateral pleural   effusions with adjacent compressive atelectasis have increased since   December 24, 2024. Emphysema.  Scattered calcified granulomas.    Mediastinum/Lymph nodes: No thoracic adenopathy.    Upper Abdomen: Evaluation of the partially imaged upper abdomen   demonstrates ascites.    Bones and Soft Tissues: No acute osseous abnormality.      IMPRESSION:  No evidence of pulmonary embolism.    Moderate bilateral pleural effusions with adjacent compressive   atelectasis have increased since December 24, 2024.    --- End of Report ---      < end of copied text >    ACC: [id number] EXAM:  XR CHEST PORTABLE ROUTINE 1V   ORDERED BY: SALBADOR BROWNE     ACC: [id number] EXAM:  XR CHEST PORTABLE ROUTINE 1V   ORDERED BY:  CLIVE KAHN     PROCEDURE DATE:  01/09/2025          INTERPRETATION:  TIME OF EXAM: January 9, 2025 at 9:00 AM.    CLINICAL INFORMATION: Bilateral pleural effusion.    COMPARISON:  CTA of the chest from January 7, 2025.    TECHNIQUE:   AP Portable chest x-ray.    INTERPRETATION:    Heart size and the mediastinum cannot be accurately evaluated on this   projection. Calcified thoracic aorta.  There is a CT injectable right IJ approach port with tip at the right   atrial/IVC junction.  Calcified right upper lobe granuloma.  There is left midlung linear atelectasis versus extension of pleural   fluid into the left major fissure.  There are bilateral small pleural effusions, larger on the left, with   likely associated passive atelectasis. The right-sided effusion extends   into the base of the right major fissure.  No pneumothorax is seen.  There is no acute bony abnormality.    AP portable chest x-ray from January 10, 2025 at 9:06 AM:    CLINICAL INFORMATION: Pleural effusion.    INTERPRETATION:    There is a small right pleural effusion. Previous extension into the base   of themajor fissure is no longer apparent.  Decreased left pleural effusion with residual left lower/retrocardiac   opacity seen. Continued left midlung linear atelectasis versus pleural   fluid extending into the left major fissure.  No pneumothorax.      IMPRESSION:  Small right pleural effusion on the most recent image.   Extension into the base of the right major fissure is no longer seen.    Decreased left pleural effusion with residual left lower/retrocardiac   opacity on that image. The residualopacities may correspond to loculated   left pleural fluid with associated passive atelectasis. Atelectasis of   other cause and/or other pathology including, but not limited to,   pneumonia is not excluded.    Continued left midlung linear atelectasis versus pleural fluid extending   into the left major fissure.    --- End of Report ---        Imaging Personally Reviewed:  [x ] YES  [ ] NO    Consultant(s) Notes Reviewed:  [x ] YES  [ ] NO    PHYSICAL EXAM:  GENERAL: NAD,  HEAD:  Atraumatic, Normocephalic  EYES:  conjunctiva and sclera clear  ENMT: Moist mucous membranes  NECK: Supple  NERVOUS SYSTEM:  Alert & Oriented X3  CHEST/LUNG: CTA bilaterally; No rales, rhonchi, wheezing  HEART: Regular rate and rhythm  ABDOMEN: Soft, Nontender, Nondistended; Bowel sounds present  EXTREMITIES:  No clubbing, cyanosis, or edema  SKIN: No rashes     Care Collaborated Discussed with Consultants/Other Providers [x ] YES  [ ] NO

## 2025-01-15 NOTE — DISCHARGE NOTE NURSING/CASE MANAGEMENT/SOCIAL WORK - NSDCPEFALRISK_GEN_ALL_CORE
For information on Fall & Injury Prevention, visit: [email or web address] OR  [email or web address] OR  https://www.cdc.gov/steadi/patient.html

## 2025-01-15 NOTE — DISCHARGE NOTE PROVIDER - CARE PROVIDER_API CALL
Davie Palma.  Medical Oncology  [address], [address]  [place] [address]  Phone: [phone]  Fax: [phone]  Established Patient  Scheduled Appointment: 01/16/2025   Davie Palma.  Medical Oncology  [address], [address]  [place] [address]  Phone: [phone]  Fax: [phone]  Established Patient  Scheduled Appointment: 01/16/2025    Frankie Sorto, [facility]  Surgery  [address] [place] [address], Division [place] [address]  Phone: [phone]  Fax: [phone]  Follow Up Time: 2 weeks

## 2025-01-15 NOTE — CONSULT NOTE ADULT - PROBLEM SELECTOR RECOMMENDATION 9
P/w Dyspnea on exertion, palpitation on JOHNSON for the past 3 weeks.  Resp stable on RA    - S/p CXR and CTA Chest showed progressive moderately sized pleural effusions, no PE  - C/w Lasix 40mg IV BID       DNR/DNI

## 2025-01-15 NOTE — PROGRESS NOTE ADULT - PROBLEM SELECTOR PLAN 5
pt is from home  pending surg onc consult  respiratory symptoms to improve, pt O2 sat on ambulation on room air is 92%. does not qualify for home O2
pt is from home  surgery rec conservative mgnt with no surgery at this time  Pt. to f/u with OP DR. Palma and OP Dr. Muse  respiratory symptoms improved, pt. on RA  palliative follows: dnr/I    d/c 1/16/25- refused d/c 1/15/25 ( 24 hr notice given; attending aware)
pt is from home  surgery recc conservative mgnt with no surgery at this time  Pt. to f/u with OP DR. Palma and OP Dr. Muse  respiratory symptoms improved, pt. on RA  palliative to be consulted
- C/w Lovenox  - C/w Protonix
pt is from home  pending surg onc consult  respiratory symptoms to improve, pt O2 sat on ambulation on room air is 92%. does not qualify for home O2
pt is from home  pending onc consult  respiratory symptoms to improve
pt is from home  pending onc consult  respiratory symptoms to improve
pt is from home  pending surg onc consult  respiratory symptoms to improve, pt O2 sat on ambulation on room air is 92%. does not qualify for home O2
pt is from home  pending surg onc consult  respiratory symptoms to improve, pt O2 sat on ambulation on room air is 92%. does not qualify for home O2
pt is from home  surgery rec conservative mgnt with no surgery at this time  Pt. to f/u with OP DR. Palma and OP Dr. Muse  respiratory symptoms improved, pt. on RA  palliative follows: dnr/I    d/c 1/16/25- refused d/c 1/15/25 ( 24 hr notice given; attending aware)
pt is from home  pending surg onc consult  respiratory symptoms to improve, pt O2 sat on ambulation on room air is 92%. does not qualify for home O2
pt is from home  pending surg onc consult  respiratory symptoms to improve, pt O2 sat on ambulation on room air is 92%. does not qualify for home O2
pt is from home  surgery recc conservative mgnt with no surgery at this time  Pt. to f/u with OP DR. Palma and OP Dr. Muse  respiratory symptoms improved, pt. on RA  palliative to be consulted
- C/w Lovenox  - C/w Protonix
36.7
Quality 130: Documentation Of Current Medications In The Medical Record: Current Medications Documented
Detail Level: Detailed

## 2025-01-15 NOTE — PROGRESS NOTE ADULT - PROBLEM SELECTOR PROBLEM 3
HTN (hypertension)
Gastric cancer
Gastric cancer
HTN (hypertension)

## 2025-01-15 NOTE — PROGRESS NOTE ADULT - TIME BILLING
- Review of records, telemetry, vital signs and daily labs.   - General and cardiovascular physical examination.  - Generation of cardiovascular treatment plan and completion of note .  - Coordination of care.      Patient was seen and examined by me on 1/11/25 ,interim events noted,labs and radiology studies reviewed.  Will Torres MD,FACC.  [address] [place], [place] [id number].  [phone]  Availabe to call or text on [employer] TEAMS.
- Review of records, telemetry, vital signs and daily labs.   - General and cardiovascular physical examination.  - Generation of cardiovascular treatment plan and completion of note .  - Coordination of care.      Patient was seen and examined by me on 1/14/25 ,interim events noted,labs and radiology studies reviewed.  Will Torres MD,FACC.  [address] [place], [place] [id number].  [phone]  Availabe to call or text on [employer] TEAMS.
- Review of records, telemetry, vital signs and daily labs.   - General and cardiovascular physical examination.  - Generation of cardiovascular treatment plan and completion of note .  - Coordination of care.      Patient was seen and examined by me on 1/15/25 ,interim events noted,labs and radiology studies reviewed.  Will Torres MD,FACC.  [address] [place], [place] [id number].  [phone]  Availabe to call or text on [employer] TEAMS.
- Review of records, telemetry, vital signs and daily labs.   - General and cardiovascular physical examination.  - Generation of cardiovascular treatment plan and completion of note .  - Coordination of care.      Patient was seen and examined by me on 1/12/25 ,interim events noted,labs and radiology studies reviewed.  Will Torres MD,FACC.  [address] [place], [place] [id number].  [phone]  Availabe to call or text on Microsoft TEAMS.
- Review of records, telemetry, vital signs and daily labs.   - General and cardiovascular physical examination.  - Generation of cardiovascular treatment plan and completion of note .  - Coordination of care.      Patient was seen and examined by me on 1/9/25 ,interim events noted,labs and radiology studies reviewed.  Will Torres MD,FACC.  [address] [place], [place] [id number].  [phone]  Availabe to call or text on [employer] TEAMS.
- Review of records, telemetry, vital signs and daily labs.   - General and cardiovascular physical examination.  - Generation of cardiovascular treatment plan and completion of note .  - Coordination of care.      Patient was seen and examined by me on 1/10/25 ,interim events noted,labs and radiology studies reviewed.  Will Torres MD,FACC.  [address] [place], [place] [id number].  [phone]  Availabe to call or text on [employer] TEAMS.
- Review of records, telemetry, vital signs and daily labs.   - General and cardiovascular physical examination.  - Generation of cardiovascular treatment plan and completion of note .  - Coordination of care.      Patient was seen and examined by me on 1/13/25 ,interim events noted,labs and radiology studies reviewed.  Will Torres MD,FACC.  [address] [place], [place] [id number].  [phone]  Availabe to call or text on [employer] TEAMS.

## 2025-01-15 NOTE — PROGRESS NOTE ADULT - SUBJECTIVE AND OBJECTIVE BOX
Patient is a 54y old  Male who presents with a chief complaint of Pleural effusion (15 Zack 2025       INTERVAL HPI/OVERNIGHT EVENTS: pt seen and examined    I&O's Summary    Vital Signs Last 24 Hrs  T(C): 36.6 (15 Zack 2025 13:18), Max: 37.3 (14 Jan 2025 21:15)  T(F): 97.9 (15 Zack 2025 13:18), Max: 99.1 (14 Jan 2025 21:15)  HR: 88 (15 Zack 2025 14:23) (82 - 106)  BP: 110/58 (15 Zack 2025 14:23) (107/67 - 126/76)  BP(mean): 91 (15 Zack 2025 13:18) (78 - 91)  RR: 18 (15 Zack 2025 13:18) (18 - 18)  SpO2: 95% (15 Zack 2025 13:18) (91% - 95%)    Parameters below as of 15 Zack 2025 13:18  Patient On (Oxygen Delivery Method): room air      PAST MEDICAL & SURGICAL HISTORY:  HTN (hypertension)      History of prediabetes      Hyperlipidemia      Cigarette smoker      Malignant neoplasm of body of stomach      No significant past surgical history          SOCIAL HISTORY  Alcohol:  Tobacco:  Illicit substance use:      FAMILY HISTORY:      LABS:                        9.8    8.46  )-----------( 118      ( 15 Zack 2025 07:03 )             29.4     01-15    136  |  101  |  16  ----------------------------<  131[H]  3.3[L]   |  27  |  0.52    Ca    8.2[L]      15 Zack 2025 07:03        Urinalysis Basic - ( 15 Zack 2025 07:03 )    Color: x / Appearance: x / SG: x / pH: x  Gluc: 131 mg/dL / Ketone: x  / Bili: x / Urobili: x   Blood: x / Protein: x / Nitrite: x   Leuk Esterase: x / RBC: x / WBC x   Sq Epi: x / Non Sq Epi: x / Bacteria: x      CAPILLARY BLOOD GLUCOSE            Urinalysis Basic - ( 15 Zack 2025 07:03 )    Color: x / Appearance: x / SG: x / pH: x  Gluc: 131 mg/dL / Ketone: x  / Bili: x / Urobili: x   Blood: x / Protein: x / Nitrite: x   Leuk Esterase: x / RBC: x / WBC x   Sq Epi: x / Non Sq Epi: x / Bacteria: x        MEDICATIONS  (STANDING):  albuterol/ipratropium for Nebulization 3 milliLiter(s) Nebulizer every 6 hours  enoxaparin Injectable 40 milliGRAM(s) SubCutaneous every 24 hours  fluticasone propionate/ salmeterol 250-50 MICROgram(s) Diskus 1 Dose(s) Inhalation two times a day  furosemide   Injectable 40 milliGRAM(s) IV Push two times a day  pantoprazole    Tablet 40 milliGRAM(s) Oral before breakfast    MEDICATIONS  (PRN):  acetaminophen     Tablet .. 650 milliGRAM(s) Oral every 6 hours PRN Temp greater or equal to 38C (100.4F), Mild Pain (1 - 3)  aluminum hydroxide/magnesium hydroxide/simethicone Suspension 30 milliLiter(s) Oral every 4 hours PRN Dyspepsia  melatonin 3 milliGRAM(s) Oral at bedtime PRN Insomnia  ondansetron Injectable 4 milliGRAM(s) IV Push every 8 hours PRN Nausea and/or Vomiting      REVIEW OF SYSTEMS:  CONSTITUTIONAL: No fever  EYES: No eye pain, visual disturbances  ENMT: No sinus or throat pain  NECK: No pain or stiffness  RESPIRATORY: No cough, No shortness of breath  CARDIOVASCULAR: No chest pain, palpitations, + dizziness  GASTROINTESTINAL: No abdominal  pain. + nausea, vomiting  GENITOURINARY: No dysuria  NEUROLOGICAL: No headaches  SKIN: No rashes  MUSCULOSKELETAL: No joint pain     RADIOLOGY & ADDITIONAL TESTS:  < from: CT Angio Chest PE Protocol w/ IV Cont (01.07.25 @ 13:05) >    ACC: [id number] EXAM:  CT ANGIO CHEST PULM ART WAWIC   ORDERED BY:    KAM ZAMARRIPA     PROCEDURE DATE:  01/07/2025          INTERPRETATION:  CLINICAL INDICATION: Dyspnea, gastric cancer    TECHNIQUE: A volumetric acquisition of the chest was obtained from the   thoracic inlet to the upper abdomen during dynamic administration of   intravenous contrast according to the PE protocol. 3D MIP images were   provided.    CONTRAST/COMPLICATIONS:  IV Contrast: Omnipaque 350  60 cc administered   40 cc discarded  Oral Contrast: NONE  Complications: .    COMPARISON: CT abdomen 12/24/2024.      FINDINGS:  Pulmonary Artery: The study is technically adequate with a good contrast   bolus to the pulmonary arteries. There are no filling defects in the  pulmonary artery or its branches.    Aorta: Normal in course and caliber.    Cardiac: The heart is normal in size. No pericardial effusion.  Right   sided central venous access port with the tip terminating in the right   atrium.    Lungs/Airways/Pleura: Patent central airways. Moderate bilateral pleural   effusions with adjacent compressive atelectasis have increased since   December 24, 2024. Emphysema.  Scattered calcified granulomas.    Mediastinum/Lymph nodes: No thoracic adenopathy.    Upper Abdomen: Evaluation of the partially imaged upper abdomen   demonstrates ascites.    Bones and Soft Tissues: No acute osseous abnormality.      IMPRESSION:  No evidence of pulmonary embolism.    Moderate bilateral pleural effusions with adjacent compressive   atelectasis have increased since December 24, 2024.    --- End of Report ---      < end of copied text >    ACC: [id number] EXAM:  XR CHEST PORTABLE ROUTINE 1V   ORDERED BY: SALBADOR BROWNE     ACC: [id number] EXAM:  XR CHEST PORTABLE ROUTINE 1V   ORDERED BY:  CLIVE KAHN     PROCEDURE DATE:  01/09/2025          INTERPRETATION:  TIME OF EXAM: January 9, 2025 at 9:00 AM.    CLINICAL INFORMATION: Bilateral pleural effusion.    COMPARISON:  CTA of the chest from January 7, 2025.    TECHNIQUE:   AP Portable chest x-ray.    INTERPRETATION:    Heart size and the mediastinum cannot be accurately evaluated on this   projection. Calcified thoracic aorta.  There is a CT injectable right IJ approach port with tip at the right   atrial/IVC junction.  Calcified right upper lobe granuloma.  There is left midlung linear atelectasis versus extension of pleural   fluid into the left major fissure.  There are bilateral small pleural effusions, larger on the left, with   likely associated passive atelectasis. The right-sided effusion extends   into the base of the right major fissure.  No pneumothorax is seen.  There is no acute bony abnormality.    AP portable chest x-ray from January 10, 2025 at 9:06 AM:    CLINICAL INFORMATION: Pleural effusion.    INTERPRETATION:    There is a small right pleural effusion. Previous extension into the base   of themajor fissure is no longer apparent.  Decreased left pleural effusion with residual left lower/retrocardiac   opacity seen. Continued left midlung linear atelectasis versus pleural   fluid extending into the left major fissure.  No pneumothorax.      IMPRESSION:  Small right pleural effusion on the most recent image.   Extension into the base of the right major fissure is no longer seen.    Decreased left pleural effusion with residual left lower/retrocardiac   opacity on that image. The residualopacities may correspond to loculated   left pleural fluid with associated passive atelectasis. Atelectasis of   other cause and/or other pathology including, but not limited to,   pneumonia is not excluded.    Continued left midlung linear atelectasis versus pleural fluid extending   into the left major fissure.    --- End of Report ---        Imaging Personally Reviewed:  [x ] YES  [ ] NO    Consultant(s) Notes Reviewed:  [x ] YES  [ ] NO    PHYSICAL EXAM:  GENERAL: NAD,  HEAD:  Atraumatic, Normocephalic  EYES:  conjunctiva and sclera clear  ENMT: Moist mucous membranes  NECK: Supple  NERVOUS SYSTEM:  Alert awake  CHEST/LUNG: dec breath sounds at bases  HEART:s1, s2+  ABDOMEN: Soft, Nontender, Nondistended; Bowel sounds present  EXTREMITIES:  No clubbing, cyanosis, or edema  SKIN: No rashes     Care Collaborated Discussed with Consultants/Other Providers [x ] YES  [ ] NO

## 2025-01-15 NOTE — CONSULT NOTE ADULT - CONVERSATION DETAILS
Met with the pt at the bedside, Lashell Hudson ID [id number] facilitated discussion. Introduced service and  Palliative medicines  team's role in assisting w complex decision making, communication and support.  Discussed his oncology history, current clinical condition, and goals of care.  Pt endorsed he was diagnosed in March of 2024 and has had chemotherapy and surgery without good results.  He expressed feeling defeated and no longer wishes to continue with DMT.    Pt called his niece Lauren via phone, requested she participate in conversation.  Discussed above.  Lauren endorsed the ot has been telling her the same that he does not wish to continue with treatment and is open to hospice.  She lives in [place] and is unable to be present with the pt.  She is concerned because he lives alone and has no support.  She stated the pt has an appointment with the oncologist tomorrow 1/16.   Suggested they speaks with oncologist and discuss possibility of further treatments before making any decisions.   Discussed risks/benefits of LST such as CPR/ intubation, artificial nutrition/PEG in the context of advanced malignancy, w debility.  Pt stated he wants to die a natural peaceful death without CPR or intubation.  He does not want feeding tube.  He agreed to meet with oncologist before finalizing plan for hospice.  Updated Palliative care team at [facility] about the pt.   Chaplaincy offered, pt declined.  All questions answered.  Support provided.  d/w primary team and hem/onc

## 2025-01-15 NOTE — DISCHARGE NOTE PROVIDER - NSDCMRMEDTOKEN_GEN_ALL_CORE_FT
aspirin 81 mg oral tablet: orally  losartan 100 mg oral tablet: 1 tab(s) orally once a day  oxyCODONE 5 mg oral tablet: 1 tab(s) orally every 6 hours as needed for  severe pain MDD: 4  Zofran 4 mg oral tablet: 1 tab(s) orally once a day as needed for  nausea   acetaminophen 325 mg oral tablet: 2 tab(s) orally every 6 hours As needed Temp greater or equal to 38C (100.4F), Mild Pain (1 - 3)  aspirin 81 mg oral tablet: orally  Lasix 40 mg oral tablet: 1 tab(s) orally once a day  losartan 100 mg oral tablet: 1 tab(s) orally once a day  oxyCODONE 5 mg oral tablet: 1 tab(s) orally every 6 hours as needed for  severe pain MDD: 4  Zofran 4 mg oral tablet: 1 tab(s) orally once a day as needed for  nausea   acetaminophen 325 mg oral tablet: 2 tab(s) orally every 6 hours As needed Temp greater or equal to 38C (100.4F), Mild Pain (1 - 3)  aspirin 81 mg oral tablet: orally  fluticasone-salmeterol 250 mcg-50 mcg inhalation powder: 1 inhaler(s) inhaled 2 times a day  Lasix 40 mg oral tablet: 1 tab(s) orally once a day  losartan 100 mg oral tablet: 1 tab(s) orally once a day  oxyCODONE 5 mg oral tablet: 1 tab(s) orally every 6 hours as needed for  severe pain MDD: 4  Zofran 4 mg oral tablet: 1 tab(s) orally once a day as needed for  nausea

## 2025-01-15 NOTE — CONSULT NOTE ADULT - CONSULT REASON
Pleural effusion
Gastric Cancer / Pleural Effusion
eval for possible surgical intervention
Discuss complex medical decision making related to goals of care
pleural effusion

## 2025-01-16 VITALS
SYSTOLIC BLOOD PRESSURE: 105 MMHG | TEMPERATURE: 98 F | RESPIRATION RATE: 16 BRPM | OXYGEN SATURATION: 96 % | HEART RATE: 97 BPM | DIASTOLIC BLOOD PRESSURE: 60 MMHG

## 2025-01-16 PROCEDURE — 82040 ASSAY OF SERUM ALBUMIN: CPT

## 2025-01-16 PROCEDURE — 71045 X-RAY EXAM CHEST 1 VIEW: CPT

## 2025-01-16 PROCEDURE — 80048 BASIC METABOLIC PNL TOTAL CA: CPT

## 2025-01-16 PROCEDURE — 99285 EMERGENCY DEPT VISIT HI MDM: CPT | Mod: 25

## 2025-01-16 PROCEDURE — 93306 TTE W/DOPPLER COMPLETE: CPT

## 2025-01-16 PROCEDURE — 83735 ASSAY OF MAGNESIUM: CPT

## 2025-01-16 PROCEDURE — 85027 COMPLETE CBC AUTOMATED: CPT

## 2025-01-16 PROCEDURE — 36415 COLL VENOUS BLD VENIPUNCTURE: CPT

## 2025-01-16 PROCEDURE — 80053 COMPREHEN METABOLIC PANEL: CPT

## 2025-01-16 PROCEDURE — 84100 ASSAY OF PHOSPHORUS: CPT

## 2025-01-16 PROCEDURE — 94640 AIRWAY INHALATION TREATMENT: CPT

## 2025-01-16 PROCEDURE — 84484 ASSAY OF TROPONIN QUANT: CPT

## 2025-01-16 PROCEDURE — 71275 CT ANGIOGRAPHY CHEST: CPT | Mod: MC

## 2025-01-16 PROCEDURE — 83880 ASSAY OF NATRIURETIC PEPTIDE: CPT

## 2025-01-16 PROCEDURE — 87637 SARSCOV2&INF A&B&RSV AMP PRB: CPT

## 2025-01-16 PROCEDURE — 85025 COMPLETE CBC W/AUTO DIFF WBC: CPT

## 2025-01-16 RX ADMIN — PANTOPRAZOLE 40 MILLIGRAM(S): 40 TABLET, DELAYED RELEASE ORAL at 06:06

## 2025-01-16 RX ADMIN — ENOXAPARIN SODIUM 40 MILLIGRAM(S): 60 INJECTION INTRAVENOUS; SUBCUTANEOUS at 06:05

## 2025-01-16 RX ADMIN — IPRATROPIUM BROMIDE AND ALBUTEROL SULFATE 3 MILLILITER(S): .5; 2.5 SOLUTION RESPIRATORY (INHALATION) at 09:08

## 2025-01-16 RX ADMIN — FLUTICASONE PROPIONATE AND SALMETEROL 1 DOSE(S): 50; 500 POWDER ORAL; RESPIRATORY (INHALATION) at 11:09

## 2025-01-16 NOTE — PROGRESS NOTE ADULT - REASON FOR ADMISSION
Pleural effusion

## 2025-01-16 NOTE — PROGRESS NOTE ADULT - SUBJECTIVE AND OBJECTIVE BOX
Time of Visit:  Patient seen and examined.     MEDICATIONS  (STANDING):  albuterol/ipratropium for Nebulization 3 milliLiter(s) Nebulizer every 6 hours  enoxaparin Injectable 40 milliGRAM(s) SubCutaneous every 24 hours  fluticasone propionate/ salmeterol 250-50 MICROgram(s) Diskus 1 Dose(s) Inhalation two times a day  furosemide    Tablet 40 milliGRAM(s) Oral daily  pantoprazole    Tablet 40 milliGRAM(s) Oral before breakfast      MEDICATIONS  (PRN):  acetaminophen     Tablet .. 650 milliGRAM(s) Oral every 6 hours PRN Temp greater or equal to 38C (100.4F), Mild Pain (1 - 3)  aluminum hydroxide/magnesium hydroxide/simethicone Suspension 30 milliLiter(s) Oral every 4 hours PRN Dyspepsia  melatonin 3 milliGRAM(s) Oral at bedtime PRN Insomnia  ondansetron Injectable 4 milliGRAM(s) IV Push every 8 hours PRN Nausea and/or Vomiting       Medications up to date at time of exam.    ROS; No fever , chills, cough, congestion on exam.   PHYSICAL EXAMINATION:  Vital Signs Last 24 Hrs  T(C): 37 (16 Jan 2025 05:17), Max: 37 (16 Jan 2025 05:17)  T(F): 98.6 (16 Jan 2025 05:17), Max: 98.6 (16 Jan 2025 05:17)  HR: 103 (16 Jan 2025 05:17) (82 - 103)  BP: 104/53 (16 Jan 2025 05:17) (94/64 - 126/76)  BP(mean): 91 (15 Zack 2025 13:18) (91 - 91)  RR: 16 (16 Jan 2025 05:17) (16 - 18)  SpO2: 95% (16 Jan 2025 05:17) (95% - 95%)    Parameters below as of 16 Jan 2025 05:17  Patient On (Oxygen Delivery Method): room air       General: Alert and oriented. Able to answer question with no SOB. No acute distress.     HEENT: Head is normocephalic and atraumatic. No nasal tenderness . Extraocular muscles are intact. Mucous membranes are moist.     NECK: Supple, no palpable adenopathy.    LUNGS: Non labored. Clear to auscultation with no rales,  wheezing. No use of accessory muscle.     HEART: S1 S2 Regular rate and rhythm . No JVD.     ABDOMEN: Soft, nontender, and nondistended. Active bowel sounds.     EXTREMITIES: Without any cyanosis, clubbing, rash, lesions .    NEUROLOGIC: Awake, alert, oriented.     SKIN: Warm, dry, good turgor.        LABS:                        9.8    8.46  )-----------( 118      ( 15 Zack 2025 07:03 )             29.4     01-15    136  |  101  |  16  ----------------------------<  131[H]  3.3[L]   |  27  |  0.52    Ca    8.2[L]      15 Zack 2025 07:03        Urinalysis Basic - ( 15 Zack 2025 07:03 )    Color: x / Appearance: x / SG: x / pH: x  Gluc: 131 mg/dL / Ketone: x  / Bili: x / Urobili: x   Blood: x / Protein: x / Nitrite: x   Leuk Esterase: x / RBC: x / WBC x   Sq Epi: x / Non Sq Epi: x / Bacteria: x    MICROBIOLOGY: (if applicable)    RADIOLOGY & ADDITIONAL STUDIES:  EKG:   CXR: < from: Xray Chest 1 View- PORTABLE-Routine (Xray Chest 1 View- PORTABLE-Routine in AM.) (01.10.25 @ 09:31) >    ACC: [id number] EXAM:  XR CHEST PORTABLE ROUTINE 1V   ORDERED BY: SALBADOR BROWNE     ACC: [id number] EXAM:  XR CHEST PORTABLE ROUTINE 1V   ORDERED BY:  CLIVE KAHN     PROCEDURE DATE:  01/09/2025          INTERPRETATION:  TIME OF EXAM: January 9, 2025 at 9:00 AM.    CLINICAL INFORMATION: Bilateral pleural effusion.    COMPARISON:  CTA of the chest from January 7, 2025.    TECHNIQUE:   AP Portable chest x-ray.    INTERPRETATION:    Heart size and the mediastinum cannot be accurately evaluated on this   projection. Calcified thoracic aorta.  There is a CT injectable right IJ approach port with tip at the right   atrial/IVC junction.  Calcified right upper lobe granuloma.  There is left midlung linear atelectasis versus extension of pleural   fluid into the left major fissure.  There are bilateral small pleural effusions, larger on the left, with   likely associated passive atelectasis. The right-sided effusion extends   into the base of the right major fissure.  No pneumothorax is seen.  There is no acute bony abnormality.    AP portable chest x-ray from January 10, 2025 at 9:06 AM:    CLINICAL INFORMATION: Pleural effusion.    INTERPRETATION:    There is a small right pleural effusion. Previous extension into the base   of themajor fissure is no longer apparent.  Decreased left pleural effusion with residual left lower/retrocardiac   opacity seen. Continued left midlung linear atelectasis versus pleural   fluid extending into the left major fissure.  No pneumothorax.      IMPRESSION:  Small right pleural effusion on the most recent image.   Extension into the base of the right major fissure is no longer seen.    Decreased left pleural effusion with residual left lower/retrocardiac   opacity on that image. The residualopacities may correspond to loculated   left pleural fluid with associated passive atelectasis. 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 Presented to ED with 3 weeks of shortness of breath. Prior to symptoms  3 weeks ago was able to walk 10 blocks . For pulmonary follow up of Acute hypoxic respiratory failure secondary to B/L pleural Effusions.     Suggestion:  O2 saturation 97% room air. So far saturating good room air.    Continue Duoneb via nebulization Q 6 Hours.  Continue Advair 250-50 mcg Twice daily.   No need to drain/ tap Bilateral Pleural Effusions at this time ( not enough fluid pocket to drain ).   On lasix 40 mg IVP twice daily.    DVT / GI prophylactic. On lovenox 40 mg SQ daily.       other cause and/or other pathology including, but not limited to,   pneumonia is not excluded.    Continued left midlung linear atelectasis versus pleural fluid extending   into the left major fissure.    --- End of Report ---            ABBI SALVADOR MD; Attending Radiologist  This document has been electronically signed. 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 Presented to ED with 3 weeks of shortness of breath. Prior to symptoms  3 weeks ago was able to walk 10 blocks . For pulmonary follow up of Acute hypoxic respiratory failure secondary to B/L pleural Effusions.     Suggestion:  O2 saturation 98% room air. So far been saturating good room air.    Continue Duoneb via nebulization Q 6 Hours.  Continue Advair 250-50 mcg Twice daily.   No need to drain/ tap Bilateral Pleural Effusions at this time ( not enough fluid pocket to drain ).   On lasix 40 mg Oral Daily.    DVT / GI prophylactic. On lovenox 40 mg SQ daily.

## 2025-01-16 NOTE — CHART NOTE - NSCHARTNOTEFT_GEN_A_CORE
Assessment: Nutrition f/u    Visited pt in room, Citizen of the [place] speaking however pt refuses  services and prefers niece to translate via phone. Pt reports good appetite in-house consuming >75% of meals and good acceptance of oral supplement order. No chewing/ swallowing issues reported. Denies any GI distress. No food preferences at this time. No known food allergies reported.      Factors impacting intake: [ ] none [ ] nausea  [ ] vomiting [ ] diarrhea [ ] constipation  [ ]chewing problems [ ] swallowing issues  [ ] other:     Diet Presciption: Diet, DASH/TLC:   Sodium & Cholesterol Restricted  Supplement Feeding Modality:  Oral  Ensure Plus High Protein Cans or Servings Per Day:  1       Frequency:  Daily (25 @ 16:26)    Daily Weight in k.8 (2025 06:42)    % Weight Change- n/a    Pertinent Medications: MEDICATIONS  (STANDING):  albuterol/ipratropium for Nebulization 3 milliLiter(s) Nebulizer every 6 hours  enoxaparin Injectable 40 milliGRAM(s) SubCutaneous every 24 hours  fluticasone propionate/ salmeterol 250-50 MICROgram(s) Diskus 1 Dose(s) Inhalation two times a day  furosemide    Tablet 40 milliGRAM(s) Oral daily  pantoprazole    Tablet 40 milliGRAM(s) Oral before breakfast    MEDICATIONS  (PRN):  acetaminophen     Tablet .. 650 milliGRAM(s) Oral every 6 hours PRN Temp greater or equal to 38C (100.4F), Mild Pain (1 - 3)  aluminum hydroxide/magnesium hydroxide/simethicone Suspension 30 milliLiter(s) Oral every 4 hours PRN Dyspepsia  melatonin 3 milliGRAM(s) Oral at bedtime PRN Insomnia  ondansetron Injectable 4 milliGRAM(s) IV Push every 8 hours PRN Nausea and/or Vomiting    Pertinent Labs: 01-15 Na136 mmol/L Glu 131 mg/dL[H] K+ 3.3 mmol/L[L] Cr  0.52 mg/dL BUN 16 mg/dL  Phos 3.9 mg/dL  Alb 1.9 g/dL[L]     CAPILLARY BLOOD GLUCOSE      Skin: intact pressure-wise; +1 bilateral leg/ ankle/ foot    Estimated Needs:   [x ] no change since previous assessment  [ ] recalculated:     Previous Nutrition Diagnosis:   [ ] Inadequate Energy Intake [ ]Inadequate Oral Intake [ ] Excessive Energy Intake   [ ] Underweight [x ] Increased Nutrient Needs [ ] Overweight/Obesity  [ ] Swallowing Difficult   [ ] Altered GI Function [ ] Unintended Weight Loss [ ] Food & Nutrition Related Knowledge Deficit [ ] Malnutrition   [ ] Not Ready for Diet/Life Style Changes     Nutrition Diagnosis is [x ] ongoing  [ ] Improving   [ ] resolved [ ] not applicable     New Nutrition Diagnosis: [x ] not applicable       Interventions:   Recommend to continue current diet order in place.     Monitoring and Evaluation:   [x ] PO intake [ x ] Tolerance to diet prescription [ x ] weights [ x ] labs[ x ] follow up per protocol  [ ] other:

## 2025-01-16 NOTE — PROGRESS NOTE ADULT - PROVIDER SPECIALTY LIST ADULT
Internal Medicine
Pulmonology
Pulmonology
Cardiology
Internal Medicine
Pulmonology
Pulmonology
Internal Medicine
Pulmonology
Heme/Onc
Internal Medicine
Cardiology
Cardiology
Internal Medicine
Cardiology
Internal Medicine
Cardiology
Internal Medicine

## 2025-01-22 ENCOUNTER — APPOINTMENT (OUTPATIENT)
Dept: SURGICAL ONCOLOGY | Facility: CLINIC | Age: 55
End: 2025-01-22
Payer: COMMERCIAL

## 2025-01-22 DIAGNOSIS — C16.2 MALIGNANT NEOPLASM OF BODY OF STOMACH: ICD-10-CM

## 2025-01-22 PROCEDURE — 99214 OFFICE O/P EST MOD 30 MIN: CPT | Mod: 93

## 2025-02-04 ENCOUNTER — NON-APPOINTMENT (OUTPATIENT)
Age: 55
End: 2025-02-04

## 2025-02-05 ENCOUNTER — APPOINTMENT (OUTPATIENT)
Dept: ULTRASOUND IMAGING | Facility: CLINIC | Age: 55
End: 2025-02-05
Payer: COMMERCIAL

## 2025-02-05 PROCEDURE — 76700 US EXAM ABDOM COMPLETE: CPT

## 2025-02-07 ENCOUNTER — APPOINTMENT (OUTPATIENT)
Dept: ULTRASOUND IMAGING | Facility: IMAGING CENTER | Age: 55
End: 2025-02-07

## 2025-02-11 ENCOUNTER — OUTPATIENT (OUTPATIENT)
Dept: OUTPATIENT SERVICES | Facility: HOSPITAL | Age: 55
LOS: 1 days | End: 2025-02-11
Payer: COMMERCIAL

## 2025-02-11 ENCOUNTER — APPOINTMENT (OUTPATIENT)
Dept: INTERVENTIONAL RADIOLOGY/VASCULAR | Facility: HOSPITAL | Age: 55
End: 2025-02-11

## 2025-02-11 DIAGNOSIS — C16.9 MALIGNANT NEOPLASM OF STOMACH, UNSPECIFIED: ICD-10-CM

## 2025-02-11 LAB
ALBUMIN FLD-MCNC: 0.9 G/DL — SIGNIFICANT CHANGE UP
B PERT IGG+IGM PNL SER: ABNORMAL
COLOR FLD: SIGNIFICANT CHANGE UP
EOSINOPHIL # FLD: 1 % — SIGNIFICANT CHANGE UP
FLUID INTAKE SUBSTANCE CLASS: SIGNIFICANT CHANGE UP
GLUCOSE FLD-MCNC: 105 MG/DL — SIGNIFICANT CHANGE UP
GRAM STN FLD: SIGNIFICANT CHANGE UP
LDH SERPL L TO P-CCNC: 87 U/L — SIGNIFICANT CHANGE UP
LYMPHOCYTES # FLD: 40 % — SIGNIFICANT CHANGE UP
MESOTHL CELL # FLD: 13 % — SIGNIFICANT CHANGE UP
MONOS+MACROS # FLD: 42 % — SIGNIFICANT CHANGE UP
NEUTROPHILS-BODY FLUID: 4 % — SIGNIFICANT CHANGE UP
PROT FLD-MCNC: 1.5 G/DL — SIGNIFICANT CHANGE UP
RCV VOL RI: 1650 /UL — HIGH (ref 0–5)
SPECIMEN SOURCE: SIGNIFICANT CHANGE UP
TOTAL NUCLEATED CELL COUNT, BODY FLUID: 575 /UL — SIGNIFICANT CHANGE UP
TUBE TYPE: SIGNIFICANT CHANGE UP

## 2025-02-11 PROCEDURE — 76942 ECHO GUIDE FOR BIOPSY: CPT

## 2025-02-11 PROCEDURE — 49083 ABD PARACENTESIS W/IMAGING: CPT

## 2025-02-11 PROCEDURE — 88305 TISSUE EXAM BY PATHOLOGIST: CPT

## 2025-02-11 PROCEDURE — 89051 BODY FLUID CELL COUNT: CPT

## 2025-02-11 PROCEDURE — C1729: CPT

## 2025-02-11 PROCEDURE — 87075 CULTR BACTERIA EXCEPT BLOOD: CPT

## 2025-02-11 PROCEDURE — 88112 CYTOPATH CELL ENHANCE TECH: CPT

## 2025-02-11 PROCEDURE — 87015 SPECIMEN INFECT AGNT CONCNTJ: CPT

## 2025-02-11 PROCEDURE — 83615 LACTATE (LD) (LDH) ENZYME: CPT

## 2025-02-11 PROCEDURE — 76942 ECHO GUIDE FOR BIOPSY: CPT | Mod: 26,59

## 2025-02-11 PROCEDURE — 88305 TISSUE EXAM BY PATHOLOGIST: CPT | Mod: 26

## 2025-02-11 PROCEDURE — 84157 ASSAY OF PROTEIN OTHER: CPT

## 2025-02-11 PROCEDURE — 88112 CYTOPATH CELL ENHANCE TECH: CPT | Mod: 26

## 2025-02-11 PROCEDURE — 87205 SMEAR GRAM STAIN: CPT

## 2025-02-11 PROCEDURE — 82945 GLUCOSE OTHER FLUID: CPT

## 2025-02-11 PROCEDURE — C1769: CPT

## 2025-02-11 PROCEDURE — 82042 OTHER SOURCE ALBUMIN QUAN EA: CPT

## 2025-02-11 PROCEDURE — 87070 CULTURE OTHR SPECIMN AEROBIC: CPT

## 2025-02-11 NOTE — PROCEDURE NOTE - PROCEDURE FINDINGS AND DETAILS
1400 cc cloudy serous fluid drained. Catheter removed and a sterile  dressing applied.  Specimens were obtained and sent for a complete evaluation including cytology.

## 2025-02-11 NOTE — PROCEDURE NOTE - NSINFORMCONSENT_GEN_A_CORE
Language line -Al[place]ian/Benefits, risks, and possible complications of procedure explained to patient/caregiver who verbalized understanding and gave written consent.

## 2025-02-12 ENCOUNTER — NON-APPOINTMENT (OUTPATIENT)
Age: 55
End: 2025-02-12

## 2025-02-18 LAB
CULTURE RESULTS: NO GROWTH — SIGNIFICANT CHANGE UP
SPECIMEN SOURCE: SIGNIFICANT CHANGE UP

## 2025-02-25 ENCOUNTER — NON-APPOINTMENT (OUTPATIENT)
Age: 55
End: 2025-02-25

## 2025-04-08 ENCOUNTER — APPOINTMENT (OUTPATIENT)
Dept: CT IMAGING | Facility: CLINIC | Age: 55
End: 2025-04-08
Payer: COMMERCIAL

## 2025-04-08 PROCEDURE — 71260 CT THORAX DX C+: CPT

## 2025-04-08 PROCEDURE — 74177 CT ABD & PELVIS W/CONTRAST: CPT

## 2025-05-01 ENCOUNTER — APPOINTMENT (OUTPATIENT)
Dept: SURGICAL ONCOLOGY | Facility: CLINIC | Age: 55
End: 2025-05-01
Payer: COMMERCIAL

## 2025-05-01 VITALS
SYSTOLIC BLOOD PRESSURE: 161 MMHG | DIASTOLIC BLOOD PRESSURE: 84 MMHG | BODY MASS INDEX: 20.66 KG/M2 | HEART RATE: 88 BPM | WEIGHT: 121 LBS | HEIGHT: 64 IN

## 2025-05-01 DIAGNOSIS — C16.2 MALIGNANT NEOPLASM OF BODY OF STOMACH: ICD-10-CM

## 2025-05-01 PROCEDURE — 99244 OFF/OP CNSLTJ NEW/EST MOD 40: CPT

## 2025-05-05 LAB
ALBUMIN SERPL ELPH-MCNC: 4 G/DL
ALP BLD-CCNC: 130 U/L
ALT SERPL-CCNC: 19 U/L
ANION GAP SERPL CALC-SCNC: 12 MMOL/L
APTT BLD: 34.3 SEC
AST SERPL-CCNC: 27 U/L
BASOPHILS # BLD AUTO: 0.1 K/UL
BASOPHILS NFR BLD AUTO: 0.9 %
BILIRUB SERPL-MCNC: 0.4 MG/DL
BUN SERPL-MCNC: 12 MG/DL
CALCIUM SERPL-MCNC: 9.3 MG/DL
CHLORIDE SERPL-SCNC: 108 MMOL/L
CO2 SERPL-SCNC: 25 MMOL/L
CREAT SERPL-MCNC: 0.62 MG/DL
EGFRCR SERPLBLD CKD-EPI 2021: 114 ML/MIN/1.73M2
EOSINOPHIL # BLD AUTO: 0.25 K/UL
EOSINOPHIL NFR BLD AUTO: 2.2 %
GLUCOSE SERPL-MCNC: 98 MG/DL
HCT VFR BLD CALC: 45.3 %
HGB BLD-MCNC: 14.4 G/DL
IMM GRANULOCYTES NFR BLD AUTO: 0.7 %
INR PPP: 1.21 RATIO
LYMPHOCYTES # BLD AUTO: 2.74 K/UL
LYMPHOCYTES NFR BLD AUTO: 23.6 %
MAN DIFF?: NORMAL
MCHC RBC-ENTMCNC: 27.4 PG
MCHC RBC-ENTMCNC: 31.8 G/DL
MCV RBC AUTO: 86.1 FL
MONOCYTES # BLD AUTO: 0.95 K/UL
MONOCYTES NFR BLD AUTO: 8.2 %
NEUTROPHILS # BLD AUTO: 7.5 K/UL
NEUTROPHILS NFR BLD AUTO: 64.4 %
PLATELET # BLD AUTO: 147 K/UL
POTASSIUM SERPL-SCNC: 4.3 MMOL/L
PROT SERPL-MCNC: 6.1 G/DL
PT BLD: 14.3 SEC
RBC # BLD: 5.26 M/UL
RBC # FLD: 18.2 %
SODIUM SERPL-SCNC: 146 MMOL/L
WBC # FLD AUTO: 11.62 K/UL

## 2025-05-08 ENCOUNTER — RESULT REVIEW (OUTPATIENT)
Age: 55
End: 2025-05-08

## 2025-05-08 ENCOUNTER — APPOINTMENT (OUTPATIENT)
Dept: INTERVENTIONAL RADIOLOGY/VASCULAR | Facility: HOSPITAL | Age: 55
End: 2025-05-08
Payer: SELF-PAY

## 2025-05-08 ENCOUNTER — OUTPATIENT (OUTPATIENT)
Dept: OUTPATIENT SERVICES | Facility: HOSPITAL | Age: 55
LOS: 1 days | End: 2025-05-08
Payer: SELF-PAY

## 2025-05-08 DIAGNOSIS — C16.2 MALIGNANT NEOPLASM OF BODY OF STOMACH: ICD-10-CM

## 2025-05-08 LAB
ALBUMIN FLD-MCNC: 2.7 G/DL — SIGNIFICANT CHANGE UP
B PERT IGG+IGM PNL SER: ABNORMAL
COLOR FLD: SIGNIFICANT CHANGE UP
FLUID INTAKE SUBSTANCE CLASS: SIGNIFICANT CHANGE UP
GLUCOSE FLD-MCNC: 70 MG/DL — SIGNIFICANT CHANGE UP
GRAM STN FLD: SIGNIFICANT CHANGE UP
LDH SERPL L TO P-CCNC: 317 U/L — SIGNIFICANT CHANGE UP
LYMPHOCYTES # FLD: 17 % — SIGNIFICANT CHANGE UP
MESOTHL CELL # FLD: 15 % — SIGNIFICANT CHANGE UP
MONOS+MACROS # FLD: 45 % — SIGNIFICANT CHANGE UP
NEUTROPHILS-BODY FLUID: 15 % — SIGNIFICANT CHANGE UP
OTHER CELLS FLD MANUAL: 8 % — SIGNIFICANT CHANGE UP
PATHOLOGIST REVIEW: SIGNIFICANT CHANGE UP
PH FLD: 7.8 — SIGNIFICANT CHANGE UP
PROT FLD-MCNC: 3.6 G/DL — SIGNIFICANT CHANGE UP
RCV VOL RI: HIGH /UL (ref 0–5)
SPECIMEN SOURCE: SIGNIFICANT CHANGE UP
TOTAL NUCLEATED CELL COUNT, BODY FLUID: 1775 /UL — SIGNIFICANT CHANGE UP
TUBE TYPE: SIGNIFICANT CHANGE UP

## 2025-05-08 PROCEDURE — 88342 IMHCHEM/IMCYTCHM 1ST ANTB: CPT

## 2025-05-08 PROCEDURE — 84157 ASSAY OF PROTEIN OTHER: CPT

## 2025-05-08 PROCEDURE — 76942 ECHO GUIDE FOR BIOPSY: CPT | Mod: 26

## 2025-05-08 PROCEDURE — 87075 CULTR BACTERIA EXCEPT BLOOD: CPT

## 2025-05-08 PROCEDURE — 87070 CULTURE OTHR SPECIMN AEROBIC: CPT

## 2025-05-08 PROCEDURE — 87015 SPECIMEN INFECT AGNT CONCNTJ: CPT

## 2025-05-08 PROCEDURE — 71045 X-RAY EXAM CHEST 1 VIEW: CPT | Mod: 26

## 2025-05-08 PROCEDURE — 88112 CYTOPATH CELL ENHANCE TECH: CPT

## 2025-05-08 PROCEDURE — 89051 BODY FLUID CELL COUNT: CPT

## 2025-05-08 PROCEDURE — 88342 IMHCHEM/IMCYTCHM 1ST ANTB: CPT | Mod: 26

## 2025-05-08 PROCEDURE — 88341 IMHCHEM/IMCYTCHM EA ADD ANTB: CPT

## 2025-05-08 PROCEDURE — 87205 SMEAR GRAM STAIN: CPT

## 2025-05-08 PROCEDURE — 88313 SPECIAL STAINS GROUP 2: CPT | Mod: 26

## 2025-05-08 PROCEDURE — 71045 X-RAY EXAM CHEST 1 VIEW: CPT

## 2025-05-08 PROCEDURE — 32555 ASPIRATE PLEURA W/ IMAGING: CPT | Mod: RT

## 2025-05-08 PROCEDURE — 88112 CYTOPATH CELL ENHANCE TECH: CPT | Mod: 26

## 2025-05-08 PROCEDURE — 88305 TISSUE EXAM BY PATHOLOGIST: CPT | Mod: 26

## 2025-05-08 PROCEDURE — 32555 ASPIRATE PLEURA W/ IMAGING: CPT

## 2025-05-08 PROCEDURE — 88313 SPECIAL STAINS GROUP 2: CPT

## 2025-05-08 PROCEDURE — 83986 ASSAY PH BODY FLUID NOS: CPT

## 2025-05-08 PROCEDURE — 82945 GLUCOSE OTHER FLUID: CPT

## 2025-05-08 PROCEDURE — 76942 ECHO GUIDE FOR BIOPSY: CPT

## 2025-05-08 PROCEDURE — 83615 LACTATE (LD) (LDH) ENZYME: CPT

## 2025-05-08 PROCEDURE — 88341 IMHCHEM/IMCYTCHM EA ADD ANTB: CPT | Mod: 26

## 2025-05-08 PROCEDURE — 82042 OTHER SOURCE ALBUMIN QUAN EA: CPT

## 2025-05-08 PROCEDURE — 88305 TISSUE EXAM BY PATHOLOGIST: CPT

## 2025-05-12 ENCOUNTER — APPOINTMENT (OUTPATIENT)
Dept: INTERNAL MEDICINE | Facility: CLINIC | Age: 55
End: 2025-05-12
Payer: COMMERCIAL

## 2025-05-12 ENCOUNTER — OUTPATIENT (OUTPATIENT)
Dept: OUTPATIENT SERVICES | Facility: HOSPITAL | Age: 55
LOS: 1 days | End: 2025-05-12

## 2025-05-12 VITALS
OXYGEN SATURATION: 97 % | WEIGHT: 120.38 LBS | HEART RATE: 71 BPM | DIASTOLIC BLOOD PRESSURE: 80 MMHG | SYSTOLIC BLOOD PRESSURE: 158 MMHG | HEIGHT: 64 IN | BODY MASS INDEX: 20.55 KG/M2

## 2025-05-12 DIAGNOSIS — Z01.818 ENCOUNTER FOR OTHER PREPROCEDURAL EXAMINATION: ICD-10-CM

## 2025-05-12 DIAGNOSIS — K31.1 ADULT HYPERTROPHIC PYLORIC STENOSIS: ICD-10-CM

## 2025-05-12 DIAGNOSIS — Z56.0 UNEMPLOYMENT, UNSPECIFIED: ICD-10-CM

## 2025-05-12 DIAGNOSIS — Z78.9 OTHER SPECIFIED HEALTH STATUS: ICD-10-CM

## 2025-05-12 DIAGNOSIS — Z12.11 ENCOUNTER FOR SCREENING FOR MALIGNANT NEOPLASM OF COLON: ICD-10-CM

## 2025-05-12 DIAGNOSIS — Z87.891 PERSONAL HISTORY OF NICOTINE DEPENDENCE: ICD-10-CM

## 2025-05-12 DIAGNOSIS — Z87.898 PERSONAL HISTORY OF OTHER SPECIFIED CONDITIONS: ICD-10-CM

## 2025-05-12 DIAGNOSIS — K30 FUNCTIONAL DYSPEPSIA: ICD-10-CM

## 2025-05-12 DIAGNOSIS — C16.2 MALIGNANT NEOPLASM OF BODY OF STOMACH: ICD-10-CM

## 2025-05-12 DIAGNOSIS — K31.9 DISEASE OF STOMACH AND DUODENUM, UNSPECIFIED: ICD-10-CM

## 2025-05-12 DIAGNOSIS — R10.84 GENERALIZED ABDOMINAL PAIN: ICD-10-CM

## 2025-05-12 DIAGNOSIS — I10 ESSENTIAL (PRIMARY) HYPERTENSION: ICD-10-CM

## 2025-05-12 PROCEDURE — G2211 COMPLEX E/M VISIT ADD ON: CPT

## 2025-05-12 PROCEDURE — 99203 OFFICE O/P NEW LOW 30 MIN: CPT

## 2025-05-12 SDOH — ECONOMIC STABILITY - INCOME SECURITY: UNEMPLOYMENT, UNSPECIFIED: Z56.0

## 2025-05-13 ENCOUNTER — NON-APPOINTMENT (OUTPATIENT)
Age: 55
End: 2025-05-13

## 2025-05-13 DIAGNOSIS — Z01.818 ENCOUNTER FOR OTHER PREPROCEDURAL EXAMINATION: ICD-10-CM

## 2025-05-13 DIAGNOSIS — I10 ESSENTIAL (PRIMARY) HYPERTENSION: ICD-10-CM

## 2025-05-13 DIAGNOSIS — C16.2 MALIGNANT NEOPLASM OF BODY OF STOMACH: ICD-10-CM

## 2025-05-13 LAB
CULTURE RESULTS: SIGNIFICANT CHANGE UP
SPECIMEN SOURCE: SIGNIFICANT CHANGE UP

## 2025-05-14 LAB — NON-GYNECOLOGICAL CYTOLOGY STUDY: SIGNIFICANT CHANGE UP

## 2025-05-16 ENCOUNTER — APPOINTMENT (OUTPATIENT)
Dept: SURGICAL ONCOLOGY | Facility: HOSPITAL | Age: 55
End: 2025-05-16

## 2025-06-11 ENCOUNTER — OUTPATIENT (OUTPATIENT)
Dept: OUTPATIENT SERVICES | Facility: HOSPITAL | Age: 55
LOS: 1 days | End: 2025-06-11
Payer: SUBSIDIZED

## 2025-06-11 ENCOUNTER — RESULT REVIEW (OUTPATIENT)
Age: 55
End: 2025-06-11

## 2025-06-11 DIAGNOSIS — C16.9 MALIGNANT NEOPLASM OF STOMACH, UNSPECIFIED: ICD-10-CM

## 2025-06-11 PROCEDURE — 93306 TTE W/DOPPLER COMPLETE: CPT

## 2025-06-11 PROCEDURE — 93356 MYOCRD STRAIN IMG SPCKL TRCK: CPT

## 2025-06-11 PROCEDURE — 93306 TTE W/DOPPLER COMPLETE: CPT | Mod: 26

## 2025-06-14 ENCOUNTER — APPOINTMENT (OUTPATIENT)
Dept: NUCLEAR MEDICINE | Facility: CLINIC | Age: 55
End: 2025-06-14
Payer: COMMERCIAL

## 2025-06-14 PROCEDURE — 78815 PET IMAGE W/CT SKULL-THIGH: CPT | Mod: PS

## 2025-06-14 PROCEDURE — A9552: CPT

## 2025-08-05 ENCOUNTER — OUTPATIENT (OUTPATIENT)
Dept: OUTPATIENT SERVICES | Facility: HOSPITAL | Age: 55
LOS: 1 days | End: 2025-08-05

## 2025-08-05 ENCOUNTER — APPOINTMENT (OUTPATIENT)
Dept: INTERNAL MEDICINE | Facility: CLINIC | Age: 55
End: 2025-08-05
Payer: COMMERCIAL

## 2025-08-05 VITALS
TEMPERATURE: 97.6 F | SYSTOLIC BLOOD PRESSURE: 151 MMHG | BODY MASS INDEX: 20.66 KG/M2 | HEART RATE: 61 BPM | OXYGEN SATURATION: 98 % | DIASTOLIC BLOOD PRESSURE: 78 MMHG | WEIGHT: 121 LBS | HEIGHT: 64 IN

## 2025-08-05 DIAGNOSIS — Z01.818 ENCOUNTER FOR OTHER PREPROCEDURAL EXAMINATION: ICD-10-CM

## 2025-08-05 DIAGNOSIS — C16.2 MALIGNANT NEOPLASM OF BODY OF STOMACH: ICD-10-CM

## 2025-08-05 DIAGNOSIS — I10 ESSENTIAL (PRIMARY) HYPERTENSION: ICD-10-CM

## 2025-08-05 PROCEDURE — 99213 OFFICE O/P EST LOW 20 MIN: CPT

## 2025-08-05 PROCEDURE — G2211 COMPLEX E/M VISIT ADD ON: CPT

## 2025-08-06 DIAGNOSIS — I10 ESSENTIAL (PRIMARY) HYPERTENSION: ICD-10-CM

## 2025-08-06 DIAGNOSIS — C16.2 MALIGNANT NEOPLASM OF BODY OF STOMACH: ICD-10-CM

## 2025-08-06 PROBLEM — Z01.818 PRE-OPERATIVE EXAMINATION: Status: RESOLVED | Noted: 2025-05-12 | Resolved: 2025-08-06

## (undated) DEVICE — DRAPE MAYO STAND 30"

## (undated) DEVICE — SUT POLYSORB 0 30" GU-46

## (undated) DEVICE — PACK GENERAL LAPAROSCOPY

## (undated) DEVICE — LAP PAD W RING 18 X 18"

## (undated) DEVICE — NDL HYPO SAFE 22G X 1.5" (BLACK)

## (undated) DEVICE — SUT POLYSORB 2-0 36" GS-21 UNDYED

## (undated) DEVICE — ELCTR BOVIE TIP BLADE INSULATED 2.75" EDGE

## (undated) DEVICE — DRAPE 1/2 SHEET 40X57"

## (undated) DEVICE — SPONGE ENDO PEANUT 5MM

## (undated) DEVICE — TROCAR APPLIED MEDICAL KII BALLOON BLUNT TIP 12MM X 100MM

## (undated) DEVICE — MEDICATION LABELS W MARKER

## (undated) DEVICE — ENDOCATCH 10MM SPECIMEN POUCH

## (undated) DEVICE — SUT POLYSORB 3-0 30" V-20 UNDYED

## (undated) DEVICE — TUBING STRYKEFLOW II SUCTION / IRRIGATOR

## (undated) DEVICE — BLADE SCALPEL SAFETYLOCK #15

## (undated) DEVICE — LIGASURE MARYLAND 37CM

## (undated) DEVICE — WARMING BLANKET UPPER ADULT

## (undated) DEVICE — MARKING PEN W RULER

## (undated) DEVICE — ELCTR GROUNDING PAD ADULT COVIDIEN

## (undated) DEVICE — GLV 7.5 PROTEXIS (WHITE)

## (undated) DEVICE — TRAP SPECIMEN SPUTUM 40CC

## (undated) DEVICE — FOR-ESU VALLEYLAB T7E14999DX: Type: DURABLE MEDICAL EQUIPMENT

## (undated) DEVICE — SHEARS COVIDIEN ENDO SHEAR 5MM X 31CM W UNIPOLAR CAUTERY

## (undated) DEVICE — TROCAR ETHICON ENDOPATH XCEL BLADELESS 12MM X 100MM STABILITY

## (undated) DEVICE — SUT BIOSYN 4-0 18" P-12

## (undated) DEVICE — DRAPE TOWEL BLUE 17" X 24"

## (undated) DEVICE — TROCAR ETHICON ENDOPATH XCEL BLADELESS 5MM X 100MM STABILITY

## (undated) DEVICE — TUBING STRYKER PNEUMOSURE HI FLOW INSUFFLATOR

## (undated) DEVICE — VENODYNE/SCD SLEEVE CALF LARGE

## (undated) DEVICE — GLV 8 PROTEXIS (WHITE)

## (undated) DEVICE — Device

## (undated) DEVICE — D HELP - CLEARVIEW CLEARIFY SYSTEM

## (undated) DEVICE — POSITIONER FOAM MATTRESS PAD CONVOLUTED (PINK)

## (undated) DEVICE — DRAPE INSTRUMENT POUCH 6.75" X 11"

## (undated) DEVICE — DRSG TEGADERM 6"X8"

## (undated) DEVICE — DRAPE LIGHT HANDLE COVER (BLUE)